# Patient Record
Sex: FEMALE | Race: WHITE | NOT HISPANIC OR LATINO | Employment: OTHER | URBAN - METROPOLITAN AREA
[De-identification: names, ages, dates, MRNs, and addresses within clinical notes are randomized per-mention and may not be internally consistent; named-entity substitution may affect disease eponyms.]

---

## 2020-12-28 ENCOUNTER — TELEPHONE (OUTPATIENT)
Dept: SLEEP MEDICINE | Facility: MEDICAL CENTER | Age: 65
End: 2020-12-28

## 2020-12-28 NOTE — TELEPHONE ENCOUNTER
Patient called states she would like to schedule NP appointment with Dr. Nunez . Pt stated she was referred over about 1 month ago.     I informed pt we do not have a referral for her and I am unable to schedule at this time . Pt was provided with Pulm Ref fax number [phone] , pt states she will contact her PCP to forward referral

## 2021-03-01 ASSESSMENT — ENCOUNTER SYMPTOMS
DYSPNEA AT REST: 0
HEMOPTYSIS: 0
WHEEZING: 0
SHORTNESS OF BREATH: 0
CHEST TIGHTNESS: 0

## 2021-03-02 ENCOUNTER — TELEPHONE (OUTPATIENT)
Dept: SLEEP MEDICINE | Facility: MEDICAL CENTER | Age: 66
End: 2021-03-02

## 2021-03-02 ENCOUNTER — OFFICE VISIT (OUTPATIENT)
Dept: SLEEP MEDICINE | Facility: MEDICAL CENTER | Age: 66
End: 2021-03-02
Payer: COMMERCIAL

## 2021-03-02 VITALS
BODY MASS INDEX: 39.99 KG/M2 | HEIGHT: 69 IN | HEART RATE: 59 BPM | WEIGHT: 270 LBS | DIASTOLIC BLOOD PRESSURE: 68 MMHG | TEMPERATURE: 97.7 F | RESPIRATION RATE: 16 BRPM | OXYGEN SATURATION: 94 % | SYSTOLIC BLOOD PRESSURE: 126 MMHG

## 2021-03-02 DIAGNOSIS — E66.9 CLASS 2 OBESITY WITH BODY MASS INDEX (BMI) OF 39.0 TO 39.9 IN ADULT, UNSPECIFIED OBESITY TYPE, UNSPECIFIED WHETHER SERIOUS COMORBIDITY PRESENT: ICD-10-CM

## 2021-03-02 DIAGNOSIS — I48.91 ATRIAL FIBRILLATION, UNSPECIFIED TYPE (HCC): ICD-10-CM

## 2021-03-02 DIAGNOSIS — J45.40 MODERATE PERSISTENT ASTHMA WITHOUT COMPLICATION: ICD-10-CM

## 2021-03-02 DIAGNOSIS — K21.9 GASTROESOPHAGEAL REFLUX DISEASE, UNSPECIFIED WHETHER ESOPHAGITIS PRESENT: ICD-10-CM

## 2021-03-02 PROCEDURE — 99204 OFFICE O/P NEW MOD 45 MIN: CPT | Performed by: INTERNAL MEDICINE

## 2021-03-02 RX ORDER — BUDESONIDE AND FORMOTEROL FUMARATE DIHYDRATE 80; 4.5 UG/1; UG/1
2 AEROSOL RESPIRATORY (INHALATION) 2 TIMES DAILY
COMMUNITY
End: 2021-10-26 | Stop reason: SDUPTHER

## 2021-03-02 RX ORDER — FAMOTIDINE 20 MG/1
20 TABLET, FILM COATED ORAL
COMMUNITY
End: 2022-05-16

## 2021-03-02 RX ORDER — DEXLANSOPRAZOLE 60 MG/1
60 CAPSULE, DELAYED RELEASE ORAL EVERY MORNING
COMMUNITY
End: 2022-05-16

## 2021-03-02 RX ORDER — ATORVASTATIN CALCIUM 20 MG/1
20 TABLET, FILM COATED ORAL NIGHTLY
Status: ON HOLD | COMMUNITY
End: 2022-04-21

## 2021-03-02 RX ORDER — ALBUTEROL SULFATE 90 UG/1
2 AEROSOL, METERED RESPIRATORY (INHALATION) EVERY 6 HOURS PRN
COMMUNITY
End: 2021-10-26 | Stop reason: SDUPTHER

## 2021-03-02 RX ORDER — LOSARTAN POTASSIUM 100 MG/1
100 TABLET ORAL DAILY
COMMUNITY
End: 2023-08-21

## 2021-03-02 ASSESSMENT — ENCOUNTER SYMPTOMS
ABDOMINAL PAIN: 0
PALPITATIONS: 0
FEVER: 0
HEMOPTYSIS: 0
TREMORS: 0
COUGH: 0
SPUTUM PRODUCTION: 0
WEIGHT LOSS: 0
WEAKNESS: 0
SPEECH CHANGE: 0
EYE REDNESS: 0
DIZZINESS: 0
NAUSEA: 0
BACK PAIN: 0
ORTHOPNEA: 0
VOMITING: 0
CLAUDICATION: 0
FALLS: 0
SHORTNESS OF BREATH: 0
SORE THROAT: 0
MYALGIAS: 0
EYE PAIN: 0
DOUBLE VISION: 0
DEPRESSION: 0
DIAPHORESIS: 0
WHEEZING: 0
FOCAL WEAKNESS: 0
EYE DISCHARGE: 0
STRIDOR: 0
DIARRHEA: 0
SINUS PAIN: 0
HEADACHES: 0
PND: 0
PHOTOPHOBIA: 0
NECK PAIN: 0
HEARTBURN: 0
CHILLS: 0
BLURRED VISION: 0
CONSTIPATION: 0

## 2021-03-02 NOTE — TELEPHONE ENCOUNTER
Patient forgot to ask you when she needs to get her shot she is due sometimes in August [phone] thank you

## 2021-03-02 NOTE — PROGRESS NOTES
Chief Complaint   Patient presents with   • Establish Care     referral Keyur Leonard M.D.       HPI: This patient is a 65 y.o. female presenting for evaluation of asthma.  The patient's past medical history significant for gastroesophageal reflux disease on proton pump inhibitor, atrial fibrillation on chronic anticoagulation and metoprolol, osteoarthritis, asthma.  She is a lifelong non-smoker.  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  She also worked in fire restoration.  She has multiple environmental and occupational exposures related to her position.  Family history is notable for both parents who suffer from lung cancer, both were heavy tobacco users.  Her father also suffered from colon cancer.  The patient was initially diagnosed with reactive airways disease sometime in the late 1990s when she had an asthma attack while at work.  She has been managed with inhaled corticosteroids, long-acting beta agonist and montelukast since that time.  She reports being treated with prednisone and azithromycin on average 1 time per year although did not suffer an issue this past fall.  She typically is treated as an outpatient with prednisone and azithromycin.  When symptoms are well controlled she uses her rescue inhaler less than once per week.  Typical triggers for her asthma include hairspray, perfume, pollen and wildfire smoke.  She reports symptoms being well controlled today on Symbicort 80, montelukast and proair.  The last PFTs that I have are from 2013 when [facility] and showed an FEV1 of 2.22 L or 75% predicted with an FEV1/FVC ratio of 69.  She had normal total lung capacity and elevated DLCO.  No recent chest imaging.    No past medical history on file.    Social History     Socioeconomic History   • Marital status:      Spouse name: Not on file   • Number of children: Not on file    • Years of education: Not on file   • Highest education level: Not on file   Occupational History   • Not on file   Tobacco Use   • Smoking status: Not on file   Substance and Sexual Activity   • Alcohol use: Not on file   • Drug use: Not on file   • Sexual activity: Not on file   Other Topics Concern   • Not on file   Social History Narrative   • Not on file     Social Determinants of Health     Financial Resource Strain:    • Difficulty of Paying Living Expenses:    Food Insecurity:    • Worried About Running Out of Food in the Last Year:    • Ran Out of Food in the Last Year:    Transportation Needs:    • Lack of Transportation (Medical):    • Lack of Transportation (Non-Medical):    Physical Activity:    • Days of Exercise per Week:    • Minutes of Exercise per Session:    Stress:    • Feeling of Stress :    Social Connections:    • Frequency of Communication with Friends and Family:    • Frequency of Social Gatherings with Friends and Family:    • Attends Sikhism Services:    • Active Member of Clubs or Organizations:    • Attends Club or Organization Meetings:    • Marital Status:    Intimate Partner Violence:    • Fear of Current or Ex-Partner:    • Emotionally Abused:    • Physically Abused:    • Sexually Abused:        No family history on file.    Current Outpatient Medications on File Prior to Visit   Medication Sig Dispense Refill   • losartan (COZAAR) 100 MG Tab Take 100 mg by mouth every day.     • atorvastatin (LIPITOR) 20 MG Tab Take 20 mg by mouth every evening.     • Dexlansoprazole (DEXILANT) 60 MG CAPSULE DELAYED RELEASE delayed-release capsule Take  by mouth.     • albuterol (PROAIR HFA) 108 (90 Base) MCG/ACT Aero Soln inhalation aerosol Inhale 2 Puffs every 6 hours as needed for Shortness of Breath.     • budesonide-formoterol (SYMBICORT) 80-4.5 MCG/ACT Aerosol Inhale 2 Puffs 2 Times a Day.     • rivaroxaban (XARELTO) 20 MG Tab tablet Take 20 mg by mouth with dinner.     • Metoprolol  "Succinate 25 MG Capsule ER 24 Hour Sprinkle Take  by mouth. 12.5 mg daily     • famotidine (PEPCID) 20 MG Tab Take 20 mg by mouth 2 times a day.     • Magnesium 400 MG Cap Take  by mouth.     • Cholecalciferol (VITAMIN D3 PO) Take 5,000 Units by mouth every day.     • Multiple Vitamins-Minerals (ZINC PO) Take  by mouth.     • montelukast (SINGULAIR) 10 MG Tab TAKE 1 TABLET BY MOUTH IN THE EVENING 30 Tab 5     No current facility-administered medications on file prior to visit.       Allergies: Hydrocodone    ROS:   Review of Systems   Constitutional: Negative for chills, diaphoresis, fever, malaise/fatigue and weight loss.   HENT: Negative for congestion, ear discharge, ear pain, hearing loss, nosebleeds, sinus pain, sore throat and tinnitus.    Eyes: Negative for blurred vision, double vision, photophobia, pain, discharge and redness.   Respiratory: Negative for cough, hemoptysis, sputum production, shortness of breath, wheezing and stridor.    Cardiovascular: Negative for chest pain, palpitations, orthopnea, claudication, leg swelling and PND.   Gastrointestinal: Negative for abdominal pain, constipation, diarrhea, heartburn, nausea and vomiting.   Genitourinary: Negative for dysuria and urgency.   Musculoskeletal: Negative for back pain, falls, joint pain, myalgias and neck pain.   Skin: Negative for itching and rash.   Neurological: Negative for dizziness, tremors, speech change, focal weakness, weakness and headaches.   Endo/Heme/Allergies: Negative for environmental allergies.   Psychiatric/Behavioral: Negative for depression.       /68 (BP Location: Right arm, Patient Position: Sitting, BP Cuff Size: Large adult)   Pulse (!) 59   Temp 36.5 °C (97.7 °F) (Temporal)   Resp 16   Ht 1.753 m (5' 9\")   Wt 122 kg (270 lb)   SpO2 94%     Physical Exam:  Physical Exam  Constitutional:       General: She is not in acute distress.     Appearance: Normal appearance. She is well-developed. She is obese.   HENT: "      Head: Normocephalic and atraumatic.      Right Ear: External ear normal.      Left Ear: External ear normal.      Nose: Nose normal. No congestion.      Mouth/Throat:      Mouth: Mucous membranes are moist.      Pharynx: No oropharyngeal exudate.   Eyes:      General: No scleral icterus.     Extraocular Movements: Extraocular movements intact.      Conjunctiva/sclera: Conjunctivae normal.      Pupils: Pupils are equal, round, and reactive to light.   Neck:      Vascular: No JVD.      Trachea: No tracheal deviation.   Cardiovascular:      Rate and Rhythm: Normal rate and regular rhythm.      Heart sounds: Normal heart sounds. No murmur. No friction rub. No gallop.    Pulmonary:      Effort: No accessory muscle usage or respiratory distress.      Breath sounds: Normal breath sounds. No wheezing or rales.   Abdominal:      General: There is no distension.      Palpations: Abdomen is soft.      Tenderness: There is no abdominal tenderness.   Musculoskeletal:         General: No tenderness or deformity. Normal range of motion.      Cervical back: Normal range of motion and neck supple.      Right lower leg: No edema.      Left lower leg: No edema.   Lymphadenopathy:      Cervical: No cervical adenopathy.   Skin:     General: Skin is warm and dry.      Findings: No rash.      Nails: There is no clubbing.   Neurological:      Mental Status: She is alert and oriented to person, place, and time.      Cranial Nerves: No cranial nerve deficit.      Gait: Gait normal.   Psychiatric:         Mood and Affect: Mood normal.         Behavior: Behavior normal.         PFTs as reviewed by me personally: As per HPI  Imaging as reviewed by me personally: No recent    Assessment:  1. Moderate persistent asthma without complication  Spirometry   2. Class 2 obesity with body mass index (BMI) of 39.0 to 39.9 in adult, unspecified obesity type, unspecified whether serious comorbidity present     3. Gastroesophageal reflux disease,  unspecified whether esophagitis present     4. Atrial fibrillation, unspecified type (HCC)         Plan:  1.  This is chronic, stable and currently well controlled.  No recent spirometry which I will order.  In the meantime she will continue Symbicort 80, montelukast and short acting bronchodilators.  She reports being up-to-date on vaccines.    2.  This test with patient increased risk for obesity related pulmonary complications such as gastroesophageal reflux.  Encourage healthy lifestyle habits.  She is planning to start a weight loss regimen in the near future that is medically supervised.  3.  This is chronic and severe although currently well controlled with lifestyle modifications including altering her diet, spacing her meals, changing medication timing and antacid therapy.  4.  Rate controlled, chronic and on anticoagulation.  Heart rate was regular today.  Suspect paroxysmal.  Return in about 4 months (around 7/2/2021) for spirometry.

## 2021-03-03 ENCOUNTER — OFFICE VISIT (OUTPATIENT)
Dept: HEALTH INFORMATION MANAGEMENT | Facility: MEDICAL CENTER | Age: 66
End: 2021-03-03
Payer: COMMERCIAL

## 2021-03-03 VITALS
DIASTOLIC BLOOD PRESSURE: 86 MMHG | HEART RATE: 57 BPM | BODY MASS INDEX: 40.17 KG/M2 | HEIGHT: 69 IN | OXYGEN SATURATION: 92 % | WEIGHT: 271.2 LBS | SYSTOLIC BLOOD PRESSURE: 136 MMHG

## 2021-03-03 DIAGNOSIS — E78.00 HYPERCHOLESTEROLEMIA: ICD-10-CM

## 2021-03-03 DIAGNOSIS — R73.01 IMPAIRED FASTING GLUCOSE: ICD-10-CM

## 2021-03-03 DIAGNOSIS — K22.719 BARRETT'S ESOPHAGUS WITH DYSPLASIA: ICD-10-CM

## 2021-03-03 DIAGNOSIS — R63.5 WEIGHT GAIN: ICD-10-CM

## 2021-03-03 DIAGNOSIS — E88.810 METABOLIC SYNDROME: ICD-10-CM

## 2021-03-03 DIAGNOSIS — I10 ESSENTIAL HYPERTENSION: ICD-10-CM

## 2021-03-03 DIAGNOSIS — K21.9 GASTROESOPHAGEAL REFLUX DISEASE WITHOUT ESOPHAGITIS: ICD-10-CM

## 2021-03-03 DIAGNOSIS — Z98.84 H/O LAPAROSCOPIC ADJUSTABLE GASTRIC BANDING: ICD-10-CM

## 2021-03-03 PROCEDURE — 99205 OFFICE O/P NEW HI 60 MIN: CPT | Performed by: INTERNAL MEDICINE

## 2021-03-03 PROCEDURE — 97802 MEDICAL NUTRITION INDIV IN: CPT | Performed by: DIETITIAN, REGISTERED

## 2021-03-03 ASSESSMENT — PATIENT HEALTH QUESTIONNAIRE - PHQ9: CLINICAL INTERPRETATION OF PHQ2 SCORE: 0

## 2021-03-03 NOTE — PROGRESS NOTES
Bariatric Medicine H&P  Chief Complaint   Patient presents with   • Weight Gain   • Obesity       Referred by:  Carlene Bowser N.*    History of Present Illness  Sara Knight is a 65 y.o.  female who presents for weight management and to help address co-morbidities caused by overweight, as below.    The patient is hoping to improve severe GERD, prediabetes with weight loss.  She has tried weight watchers, Tania Spann but lost interest.  She is interested in the VLCD program, wants to lose weight quickly to get her GERD under better control.    She has lost 5 pounds in the last week after reducing her evening p.o. intake for GERD control.    H/o Antiobesity medications: None  H/o Bariatric Surgery: Lap band with Dr. Florez 2008  Has not had it reevaluated since placement and worsening GERD    Brief Diet History (see also RD notes):  AM: Yogurt, toast with peanut butter  Lunch/Dinner: Eggs, protein/salad, protein  Snacks: Tortilla chips, nuts  Trying to eat smaller more frequent meals  Drinks: Water, juice    Behavior-Related History  Binge eating screen: Positive at times  H/o abuse: Negative     Medical Dx:  Sleep apnea screen: Negative  HLD:  Controlled with statin  HTN:  Controlled on metoprolol  IFG:  Uncontrolled on last labs, not on glucose lowering agent  GERD: Uncontrolled on Dexilant, sees digestive health regularly  Better control in the last week on Dexilant every morning, famotidine at 5 PM, 11 PM    Exercise:   Walking, weights, yoga 3 to 4 days/week     Review of Systems   Positive for joint pain, severe heartburn.  Denies excessive fatigue or lack of energy, shortness of breath, coughing, depression or anxiety  All other ROS were reviewed with patient today and are negative.      PMH/PSH:  I have reviewed the patient's medical, social and family history, allergies, and medications today.  Prior records reviewed.  Retired environmental     Physical Exam:   /86 (BP Location:  "Left arm, Patient Position: Sitting, BP Cuff Size: Large adult)   Pulse (!) 57   Ht 1.753 m (5' 9\")   Wt 123 kg (271 lb 3.2 oz)   SpO2 92%   BMI 40.05 kg/m²   Waist Measurement   Waist: 48 inch/inches  Body fat % & REE:  see accompanying flow sheet    Constitutional: Oriented to person, place, and time and well-developed, well-nourished, and in no distress.    HENT: No facial plethora.  No Cushingoid features.  No scalloped tongue.  No dental erosions.  No swollen parotids.  Head: Normocephalic.   Eyes: EOM are normal. Pupils are equal, round, and reactive to light. No periorbital edema.  No lateral thinning of eyebrows.  No vertical nystagmus.  Neck: Normal range of motion. Neck supple. No thyromegaly present. No buffalo hump.  Cardiovascular: Normal rate and regular rhythm.  No murmur heard. No IRR.  Pulmonary/Chest: Effort normal and breath sounds normal. No wheezes.   Abdominal: Soft. Bowel sounds are normal.  Grade 2 pannus.  No ascites.  No hepatosplenomegaly.   Musculoskeletal: Normal range of motion. No edema.   Neurological: Alert and oriented to person, place, and time. Normal reflexes. No cranial nerve deficit. No muscle weakness.  Gait normal.   Skin: Warm and dry. Not diaphoretic. No hirsuitism.  No acanthosis nigricans.  Not excessively dry, scaly.  No acne.  No bruising/ecchymosis.  No hyperpigmentation.  No xanthomas or acrochordon.    Psychiatric: Mood, memory, affect and judgment normal.     Laboratory:   Pending from [facility]  EKG: Pending from [facility]    Dietitian Assessment: I have reviewed the Dietitian's assessment related to this encounter.       ASSESSMENT  65 y.o. female presents for intensive lifestyle intervention for treatment of obesity and co-morbid conditions.  Obesity Stage ([place])/Class 2/2    1. Weight gain     2. Body mass index (BMI)40.0-44.9, adult ([facility])     3. Gastroesophageal reflux disease without esophagitis     4. Impaired fasting glucose     5. H/O laparoscopic " adjustable gastric banding     6. Hypercholesterolemia     7. Essential hypertension     8. Metabolic syndrome     9. Gavin's esophagus with dysplasia         The patient has struggled with weight regain since lap band placement, now with significant GERD.  EGD pending but may need LAP-BAND removed if symptoms persist.  In the meantime, will work on more rapid weight loss to get GERD under control quickly.  Continue current PPI/H2 blocker regimen.  Focus on intensive stimulus narrowing to start.  Will add antiobesity medication if required, once back to more whole food or cravings worsen.  Consider metformin given IFG.  Monitor lipids, blood pressure.    PLAN  Weight Goal: 200 lb  Dietary intervention:     MR: 3 ND +1 lean protein/green vegetable meal  May need to divide up meal replacement shake in 2 given GERD  64+ oz water per day  Avoid snacks  Physical Activity: Walking, yoga, weights  Reduce activity level this month if excess hunger develops  Labs: Pending from lab [facility]  Diagnostics:  EKG pending from [facility]  Rx changes:   Consider Contrave  Avoid antiobesity medication while on VLCD  Behavior change:   Strict stimulus narrowing for more rapid weight loss  Surgical considerations: If GERD persists, referral back to bariatric surgery for possible lap band removal  Follow-up: 2 wks with MD and RD    Face to face time spent 60 minutes,  with >50% of time devoted to one on one counseling on weight management issues, as documented above.      Patient's body mass index is 40.05 kg/m². Exercise and nutrition counseling were performed at this visit.

## 2021-03-03 NOTE — PROGRESS NOTES
"3/3/2021     Sara Knight 65 y.o.        Time in/out: 9:56-10:19    Anthropometrics/Objective  Vitals 3/3/2021   WEIGHT 271.2   HEIGHT 5' 9\"   BMI 40.05 kg/m2     Stated Goal Weight: 200 lbs per MD note  See comprehensive patient history form for further information     Subjective:  Pt is here today for the initial screening visit for the medical weight management program.   - increased GERD symptoms as of late  - does not eat past 5:30pm  - hx of lap band 2008  - Gavin's esophagus  - hx of cholecystectomy  - tried WW, BIRGIT in the past  - trying to eat smaller, more frequent meals  - will be doing 3 MR and 1 lean meal a day    See HIP Medical Questionnaire in media for more detailed diet history     AM: Yogurt, toast with peanut butter  Lunch/Dinner: Eggs, protein/salad, protein  Snacks: Tortilla chips, nuts  Trying to eat smaller more frequent meals  Drinks: Water, juice  Nutrition Diagnosis (PES Statement)  · Obesity related to excessive energy intake and inadequate energy expenditure as evidenced by BMI >30     Client history:  Condition(s) associated with a diagnosis or treatment / Pertinent Medical Hx: Gavin's esophagus, hyperlipidemia, asthma, atrial fibrilation, GERD, arthritis, anxiety, HTN    Biochemical data, medical test and procedures  No results found for: HBA1C  No results found for: POCGLUCOSE  No results found for: CHOLSTRLTOT, LDL, HDL, TRIGLYCERIDE      Nutrition Intervention  Nutrition Prescription  Recommended Daily Kcals: 1600 Kcal based on REE of 1839 MSJ (after LCD)   Recommended Daily Protein: 100g or ~30% of kcals      Meal Plan Recommendation   LCD with 3 MR and 1 lean meal    Monitoring & Evaluation Plan        Fluid intake: Consume at least 64 oz water per day. Avoid all sweetened beverages. Limit coffee to 1 cup a day (ideally no cream or sugar). Limit or avoid alcohol as discussed with Dr. Casas.     Physical Signs / Symptoms:  Weight loss towards BMI < 30   Weight change: loss " of 1-2 lbs/week    Assessment Notes:  Sara will be doing a LCD diet with 3 MR and 1 lean meal a day. She may separate 1 of the shakes into 2 half shakes to help ease her GERD symptoms but will adjust plan as she feels appropriate and works for her.    At this visit we went over LCD protocol with product options, emergency plan, and potential side effects. Sara wants to try doing LCD for 1-3 months and will continue with only MR options at this time. Stated we will discuss snack options if needed at F/U visit with JIE in two weeks.   We discussed 3 shakes and 1 whole lean meal with regular meal time schedule for these. We discussed options for either 1 full shake at snacks or 1/2 shake at two snack times.   Let Sara know that the first week or two weeks of LCD can be an adjustment period and to pay attention to her symptoms. Encouraged her to contact us with any questions in the meantime.      F/U: 2 weeks MD JIE

## 2021-03-03 NOTE — TELEPHONE ENCOUNTER
Informed the patient I have not yet received records from [facility] pulmonary. Need to confirm which one of the pneumonia vaccine was administered and when. encouraged the patient to contact [facility] and request that information. Advised to call or message me with that information.

## 2021-03-16 ENCOUNTER — OFFICE VISIT (OUTPATIENT)
Dept: HEALTH INFORMATION MANAGEMENT | Facility: MEDICAL CENTER | Age: 66
End: 2021-03-16
Payer: COMMERCIAL

## 2021-03-16 DIAGNOSIS — E88.810 METABOLIC SYNDROME: ICD-10-CM

## 2021-03-16 DIAGNOSIS — R73.01 IMPAIRED FASTING GLUCOSE: ICD-10-CM

## 2021-03-16 DIAGNOSIS — I10 ESSENTIAL HYPERTENSION: ICD-10-CM

## 2021-03-16 DIAGNOSIS — K21.9 GASTROESOPHAGEAL REFLUX DISEASE WITHOUT ESOPHAGITIS: ICD-10-CM

## 2021-03-16 DIAGNOSIS — I48.0 PAROXYSMAL ATRIAL FIBRILLATION (HCC): ICD-10-CM

## 2021-03-16 DIAGNOSIS — R63.5 WEIGHT GAIN: ICD-10-CM

## 2021-03-16 DIAGNOSIS — E78.00 HYPERCHOLESTEROLEMIA: ICD-10-CM

## 2021-03-16 DIAGNOSIS — Z98.84 H/O LAPAROSCOPIC ADJUSTABLE GASTRIC BANDING: ICD-10-CM

## 2021-03-16 PROCEDURE — 97803 MED NUTRITION INDIV SUBSEQ: CPT | Performed by: DIETITIAN, REGISTERED

## 2021-03-16 PROCEDURE — 99214 OFFICE O/P EST MOD 30 MIN: CPT | Performed by: INTERNAL MEDICINE

## 2021-03-16 NOTE — PROGRESS NOTES
"Bariatric Medicine Follow Up  Chief Complaint   Patient presents with   • Weight Gain       History of Present Illness:   Sara Knight is a 65 y.o. female who presents for follow-up to intensive behavioral modification of overweight and to help address below co-morbidities caused by overweight.      Weight Management History to Date:  3/3/2021 visit #1:  To start 3 ND +1 lean green meal daily  Divided up ND if GERD worsens  Walking, yoga, weights daily  No AOM while on VLCD  Considering lap band removal    ___    Challenges since last visit:  Some cravings  Dietary adherence: Overall good  Only having 2 meals/d  1 ND MR in am, protein/veg at L, second ND MR in early pm  Unable to eat later d/t GERD  Admits to some cravings  Exercise:  Yardwork, walking daily    AntiObesity Medication use: none  Medication efficacy/tolerance/side effects: n/a  Medication compliance: n/a    Status of comorbid conditions/other medical diagnoses:  GERD:  No issue with ND MR  PAF:  restarted last month after last visit  IFG: No recent labs not on glucose lowering agent  HLD: No recent labs, on statin  HTN: Better control with weight loss, on losartan    Physical Exam:    /84 (BP Location: Left arm, Patient Position: Sitting, BP Cuff Size: Large adult)   Pulse 69   Ht 1.753 m (5' 9\")   Wt 121 kg (267 lb 1.6 oz)   SpO2 93%   BMI 39.44 kg/m²   Waist Measurement   Waist: 47 inch/inches  Weight change since last visit: -4.1 lb   Waist Circum change since last visit: -1 in     Physical findings unchanged from prior exam.    Laboratory:   Recent labs reviewed.     ASSESSMENT  65 y.o.  female presents for intensive lifestyle intervention for treatment of obesity and co-morbid conditions.  Obesity Stage ([place])/Class: 2/2    1. Weight gain     2. Body mass index (BMI)40.0-44.9, adult ([facility])     3. H/O laparoscopic adjustable gastric banding     4. Impaired fasting glucose  MAGNESIUM    Comp Metabolic Panel   5. Hypercholesterolemia   "   6. Essential hypertension  MAGNESIUM   7. Metabolic syndrome     8. Paroxysmal atrial fibrillation (HCC)  MAGNESIUM    Comp Metabolic Panel   9. Gastroesophageal reflux disease without esophagitis         The patient is beginning to reverse her weight gain but is undereating.  GERD under better control, not worse with meal replacements so suggest increasing dose of meal replacement therapy.  Monitor electrolytes given above and atrial fibrillation.  Monitor fasting glucose, lipids, blood pressure.  Gradually increase activity level.    PLAN  Weight Goal: 200 lb  Dietary intervention:    MR: 3 ND daily instead of 2 or at least 2.5 ND MR daily +1  Lean green meal middle of the day  64+ oz water per day  Avoid eating late to avoid GERD, as she is doing  Physical Activity: Yardwork, walking daily  Reduce activity if excess hunger and cravings given low calorie intake  Labs: CMP, mag this week  Rx changes:   N/A  Behavior modification:   Continue intensive stimulus narrowing  Surgical considerations: Consider LAP-BAND removal if GERD persists  Follow-up: one month with MD  See also recent RD notes and follow-up.      Patient's body mass index is 39.44 kg/m². Exercise and nutrition counseling were performed at this visit.

## 2021-03-16 NOTE — PROGRESS NOTES
Nutrition Reassess: Medical Weight Management   Today's date: 3/16/2021  Referring Provider: No ref. provider found      Time: in/out 3:40-4:10  Visit#: 2    Subjective:  - does not eat past 5:30pm usually   (GERD)  - hx of lap band 2008  - Gavin's esophagus  - hx of cholecystectomy  - GERD symptoms decreased!  - active most days   - yoga, walking, weight lifting  - fatigue but overall feeling better  - only using 2 MR and lean meal       Diet history:   Breakfast - tea  Snack - 9:30ish = MR  Lunch - 12ish lean grocery meal salmon and js or crab cake and salad or steak and asparagus  Snack - 0  Dinner - 4-5ish MR     Anthropometrics/Objective  Today's weight:  There were no vitals filed for this visit.  BMI:  There is no height or weight on file to calculate BMI.  Starting weight/date 271.2 lbs     Total weight change : -4 lbs            Meal Plan:   LCD with 3 meal replacements and 1 lean meal per day     ReAssesment/Notes:    Sara has been working on her health goals towards better overall health and weight loss. She finds that she has been doing well with her regimen, is low on overall kcal intake however. Has been doing only 2 shakes a day rather than 3 in addition to her lean meal. She is sitting closer to about 600-700 kcal a day with her recall. Encouraged her to aim for 1000 kcal for now as she is on LCD. We discussed adding cup full of berries to am shake and 3rd shake. This should help Sara get to her kcal goals. She agreed this would be of benefit. At next visit suggest reviewing nutrition facts label and nutrition basics as time allows in future visits.     Follow-up: 4-6 weeks

## 2021-03-17 VITALS
DIASTOLIC BLOOD PRESSURE: 84 MMHG | HEIGHT: 69 IN | OXYGEN SATURATION: 93 % | WEIGHT: 267.1 LBS | BODY MASS INDEX: 39.56 KG/M2 | HEART RATE: 69 BPM | SYSTOLIC BLOOD PRESSURE: 126 MMHG

## 2021-03-17 ASSESSMENT — PATIENT HEALTH QUESTIONNAIRE - PHQ9: CLINICAL INTERPRETATION OF PHQ2 SCORE: 0

## 2021-03-23 ENCOUNTER — HOSPITAL ENCOUNTER (OUTPATIENT)
Dept: LAB | Facility: MEDICAL CENTER | Age: 66
End: 2021-03-23
Attending: INTERNAL MEDICINE
Payer: COMMERCIAL

## 2021-03-23 DIAGNOSIS — R73.01 IMPAIRED FASTING GLUCOSE: ICD-10-CM

## 2021-03-23 DIAGNOSIS — I10 ESSENTIAL HYPERTENSION: ICD-10-CM

## 2021-03-23 DIAGNOSIS — I48.0 PAROXYSMAL ATRIAL FIBRILLATION (HCC): ICD-10-CM

## 2021-03-23 LAB
ALBUMIN SERPL BCP-MCNC: 3.8 G/DL (ref 3.2–4.9)
ALBUMIN/GLOB SERPL: 1.1 G/DL
ALP SERPL-CCNC: 87 U/L (ref 30–99)
ALT SERPL-CCNC: 14 U/L (ref 2–50)
ANION GAP SERPL CALC-SCNC: 9 MMOL/L (ref 7–16)
AST SERPL-CCNC: 15 U/L (ref 12–45)
BILIRUB SERPL-MCNC: 0.7 MG/DL (ref 0.1–1.5)
BUN SERPL-MCNC: 17 MG/DL (ref 8–22)
CALCIUM SERPL-MCNC: 9.4 MG/DL (ref 8.5–10.5)
CHLORIDE SERPL-SCNC: 104 MMOL/L (ref 96–112)
CO2 SERPL-SCNC: 27 MMOL/L (ref 20–33)
CREAT SERPL-MCNC: 0.86 MG/DL (ref 0.5–1.4)
GLOBULIN SER CALC-MCNC: 3.4 G/DL (ref 1.9–3.5)
GLUCOSE SERPL-MCNC: 101 MG/DL (ref 65–99)
MAGNESIUM SERPL-MCNC: 2.1 MG/DL (ref 1.5–2.5)
POTASSIUM SERPL-SCNC: 4.5 MMOL/L (ref 3.6–5.5)
PROT SERPL-MCNC: 7.2 G/DL (ref 6–8.2)
SODIUM SERPL-SCNC: 140 MMOL/L (ref 135–145)

## 2021-03-23 PROCEDURE — 36415 COLL VENOUS BLD VENIPUNCTURE: CPT

## 2021-03-23 PROCEDURE — 80053 COMPREHEN METABOLIC PANEL: CPT

## 2021-03-23 PROCEDURE — 83735 ASSAY OF MAGNESIUM: CPT

## 2021-04-20 ENCOUNTER — OFFICE VISIT (OUTPATIENT)
Dept: HEALTH INFORMATION MANAGEMENT | Facility: MEDICAL CENTER | Age: 66
End: 2021-04-20
Payer: COMMERCIAL

## 2021-04-20 VITALS
DIASTOLIC BLOOD PRESSURE: 74 MMHG | SYSTOLIC BLOOD PRESSURE: 126 MMHG | HEIGHT: 69 IN | BODY MASS INDEX: 38.45 KG/M2 | WEIGHT: 259.6 LBS | OXYGEN SATURATION: 92 % | HEART RATE: 57 BPM

## 2021-04-20 DIAGNOSIS — E88.810 METABOLIC SYNDROME: ICD-10-CM

## 2021-04-20 DIAGNOSIS — E78.00 HYPERCHOLESTEROLEMIA: ICD-10-CM

## 2021-04-20 DIAGNOSIS — R73.01 IMPAIRED FASTING GLUCOSE: ICD-10-CM

## 2021-04-20 DIAGNOSIS — I10 ESSENTIAL HYPERTENSION: ICD-10-CM

## 2021-04-20 DIAGNOSIS — Z98.84 H/O LAPAROSCOPIC ADJUSTABLE GASTRIC BANDING: ICD-10-CM

## 2021-04-20 DIAGNOSIS — E66.9 OBESITY (BMI 30-39.9): ICD-10-CM

## 2021-04-20 DIAGNOSIS — R63.5 WEIGHT GAIN: ICD-10-CM

## 2021-04-20 PROCEDURE — 97803 MED NUTRITION INDIV SUBSEQ: CPT | Performed by: DIETITIAN, REGISTERED

## 2021-04-20 PROCEDURE — 99214 OFFICE O/P EST MOD 30 MIN: CPT | Performed by: INTERNAL MEDICINE

## 2021-04-20 ASSESSMENT — PATIENT HEALTH QUESTIONNAIRE - PHQ9: CLINICAL INTERPRETATION OF PHQ2 SCORE: 0

## 2021-04-20 NOTE — PROGRESS NOTES
"Bariatric Medicine Follow Up  Chief Complaint   Patient presents with   • Weight Gain       History of Present Illness:   Sara Knight is a 65 y.o. female who presents for follow-up to intensive behavioral modification of overweight and to help address below co-morbidities caused by overweight.      Weight Management History to Date:  3/16/2021 visit #2:  3 ND MR instead of 2 daily  Lean green meal middle of the day  Avoid eating late to reduce GERD  Yardwork, walking daily      3/3/2021 visit #1:  To start 3 ND +1 lean green meal daily  Divided up ND if GERD worsens  Walking, yoga, weights daily  No AOM while on VLCD  Considering lap band removal  ___    Challenges since last visit:  fewer  Dietary adherence:  Good  3 ND MR + 1 grocery meal, lower carb before 3 pm  Exercise:  Walking 3 d/w, yoga in between    AntiObesity Medication use: n/a  Medication efficacy/tolerance/side effects: n/a  Medication compliance: n/a    Status of comorbid conditions/other medical diagnoses:  IFG:  Uncontrolled on most recent labs, not on glucose lowering agent  HLD: Controlled on statin  HTN: Controlled on losartan       Physical Exam:    /74 (BP Location: Left arm, Patient Position: Sitting, BP Cuff Size: Large adult long)   Pulse (!) 57   Ht 1.753 m (5' 9\")   Wt 118 kg (259 lb 9.6 oz)   SpO2 92%   BMI 38.34 kg/m²   Waist Measurement   Waist: 47 inch/inches  Weight change since last visit: -7.5 lb (-11.6 lb total)  Waist Circum change since last visit: 0 in (-1 in total)    Physical findings unchanged from prior exam.    Laboratory:   Recent labs reviewed.     ASSESSMENT  65 y.o.  female presents for intensive lifestyle intervention for treatment of obesity and co-morbid conditions.  Obesity Stage ([place])/Class: 2/2    1. Weight gain     2. Obesity (BMI 30-39.9)     3. Impaired fasting glucose     4. Hypercholesterolemia     5. Essential hypertension     6. Metabolic syndrome     7. H/O laparoscopic adjustable gastric " banding         The patient is doing well, continues to reverse her previous weight gain.  He is responding to more intensive stimulus narrowing so recommend continuing same.  Monitor fasting glucose, which should improve with further weight loss, lipids, blood pressure.    PLAN  Weight Goal:  200 lb  Dietary intervention:    MR:  3 ND +  High Protein/Low Carb whole food meal mid day  64+ oz water per day  Not eating after 3 pm to avoid later GERD sx  Physical Activity: Yardwork, walking at least 30 minutes daily recommended  Labs: N/A  Rx changes:   Avoid GLP1 given GERD  Side Effects: Risks, benefits, alternatives discussed, consent signed.  Behavior modification:   Continue stimulus narrowing  Surgical considerations: Considering LAP-BAND removal if GERD persists  Follow-up: one month with MD  See also recent RD notes and follow-up.      Patient's body mass index is 38.34 kg/m². Exercise and nutrition counseling were performed at this visit.

## 2021-04-20 NOTE — PROGRESS NOTES
"Nutrition Reassess: Medical Weight Management   Today's date: 4/20/2021  Referring Provider: No ref. provider found      Time: in/out 11:09-11:25  Visit#: 3    Subjective:  - does not eat past 5:30pm usually              (GERD)  - hx of lap band 2008  - Gavin's esophagus  - hx of cholecystectomy  - adding berries to shake now  - pleased with progress    Anthropometrics/Objective  Vitals 4/20/2021   WEIGHT 259.6   HEIGHT 5' 9\"   BMI 38.34 kg/m2       Starting weight/date 271.2 lbs              Total weight change : -12 lbs                                                                      Meal Plan:   LCD with 2-3 meal replacements and 1 lean meal per day     ReAssesment/Notes:    Sara has been working on her overall health goals. She has been pleased with her progress, lowered GERD reactions/severity. Is concerned about her trending down in skeletal muscle mass per anthropometric data. We discussed weight bearing exercises. Discussed yoga and fitness resources. Sara will work on this is in the meantime. At next visit suggest reviewing nutrition facts label.    Follow-up: 4-6 weeks     "

## 2021-04-24 ENCOUNTER — HOSPITAL ENCOUNTER (OUTPATIENT)
Dept: RADIOLOGY | Facility: MEDICAL CENTER | Age: 66
End: 2021-04-24

## 2021-04-24 ENCOUNTER — HOSPITAL ENCOUNTER (OUTPATIENT)
Dept: RADIOLOGY | Facility: MEDICAL CENTER | Age: 66
End: 2021-04-24
Attending: FAMILY MEDICINE
Payer: COMMERCIAL

## 2021-04-24 DIAGNOSIS — Z12.31 VISIT FOR SCREENING MAMMOGRAM: ICD-10-CM

## 2021-04-24 PROCEDURE — 77063 BREAST TOMOSYNTHESIS BI: CPT

## 2021-05-18 ENCOUNTER — PRE-ADMISSION TESTING (OUTPATIENT)
Dept: ADMISSIONS | Facility: MEDICAL CENTER | Age: 66
End: 2021-05-18
Attending: INTERNAL MEDICINE
Payer: COMMERCIAL

## 2021-05-18 DIAGNOSIS — Z01.812 PRE-OPERATIVE LABORATORY EXAMINATION: ICD-10-CM

## 2021-05-18 LAB
ANION GAP SERPL CALC-SCNC: 9 MMOL/L (ref 7–16)
BUN SERPL-MCNC: 15 MG/DL (ref 8–22)
CALCIUM SERPL-MCNC: 9.2 MG/DL (ref 8.4–10.2)
CHLORIDE SERPL-SCNC: 107 MMOL/L (ref 96–112)
CO2 SERPL-SCNC: 24 MMOL/L (ref 20–33)
CREAT SERPL-MCNC: 0.85 MG/DL (ref 0.5–1.4)
EST. AVERAGE GLUCOSE BLD GHB EST-MCNC: 131 MG/DL
GLUCOSE SERPL-MCNC: 106 MG/DL (ref 65–99)
HBA1C MFR BLD: 6.2 % (ref 4–5.6)
POTASSIUM SERPL-SCNC: 3.6 MMOL/L (ref 3.6–5.5)
SODIUM SERPL-SCNC: 140 MMOL/L (ref 135–145)

## 2021-05-18 PROCEDURE — 80048 BASIC METABOLIC PNL TOTAL CA: CPT

## 2021-05-18 PROCEDURE — 83036 HEMOGLOBIN GLYCOSYLATED A1C: CPT

## 2021-05-18 PROCEDURE — 36415 COLL VENOUS BLD VENIPUNCTURE: CPT

## 2021-05-18 RX ORDER — UBIDECARENONE 75 MG
200 CAPSULE ORAL DAILY
COMMUNITY

## 2021-05-18 NOTE — PREPROCEDURE INSTRUCTIONS
"Patient present for preadmit appointment: \"Preparing for your Procedure information,\" pain management, patient safety, covid symptoms, self isolating and fasting protocol per anesthesia sheets given to patient with verbal and written instructions. Patient instructed to continue prescribed medications through the day before surgery, instructed to take the following medications the day of surgery per anesthesia protocol: Symbicort, Pepcid, Metoprolol, Albuterol if needed. Patient instructed to stop blood thinner per MD instruction: 2 days prior. Patient states no reaction to previous anesthesia. Pt reports no s/s of urinary tract infection. Covid appointment scheduled 5/31. Patient educated to call MD's office if any symptoms of Covid appear. Patient understands education and has no other questions or concerns at this time.   "

## 2021-05-27 ENCOUNTER — OFFICE VISIT (OUTPATIENT)
Dept: HEALTH INFORMATION MANAGEMENT | Facility: MEDICAL CENTER | Age: 66
End: 2021-05-27
Payer: COMMERCIAL

## 2021-05-27 VITALS
SYSTOLIC BLOOD PRESSURE: 124 MMHG | BODY MASS INDEX: 37.29 KG/M2 | HEART RATE: 54 BPM | WEIGHT: 251.8 LBS | DIASTOLIC BLOOD PRESSURE: 76 MMHG | HEIGHT: 69 IN | OXYGEN SATURATION: 95 %

## 2021-05-27 DIAGNOSIS — Z98.84 H/O LAPAROSCOPIC ADJUSTABLE GASTRIC BANDING: ICD-10-CM

## 2021-05-27 DIAGNOSIS — K21.9 GASTROESOPHAGEAL REFLUX DISEASE WITHOUT ESOPHAGITIS: ICD-10-CM

## 2021-05-27 DIAGNOSIS — R63.5 WEIGHT GAIN: ICD-10-CM

## 2021-05-27 DIAGNOSIS — E78.00 HYPERCHOLESTEROLEMIA: ICD-10-CM

## 2021-05-27 DIAGNOSIS — R73.01 IMPAIRED FASTING GLUCOSE: ICD-10-CM

## 2021-05-27 DIAGNOSIS — E88.810 METABOLIC SYNDROME: ICD-10-CM

## 2021-05-27 DIAGNOSIS — I10 ESSENTIAL HYPERTENSION: ICD-10-CM

## 2021-05-27 DIAGNOSIS — E66.9 OBESITY (BMI 30-39.9): ICD-10-CM

## 2021-05-27 PROCEDURE — 99214 OFFICE O/P EST MOD 30 MIN: CPT | Performed by: INTERNAL MEDICINE

## 2021-05-27 PROCEDURE — 97803 MED NUTRITION INDIV SUBSEQ: CPT | Performed by: DIETITIAN, REGISTERED

## 2021-05-27 ASSESSMENT — PATIENT HEALTH QUESTIONNAIRE - PHQ9: CLINICAL INTERPRETATION OF PHQ2 SCORE: 0

## 2021-05-27 NOTE — PROGRESS NOTES
"Bariatric Medicine Follow Up  Chief Complaint   Patient presents with   • Weight Gain   • Obesity       History of Present Illness:   Sara Knight is a 65 y.o. female who presents for follow-up to intensive behavioral modification of overweight and to help address below co-morbidities caused by overweight.      Weight Management History to Date:  4/20/2021 visit #3:  Patient is doing well, has lost 11.6 pounds  Continues 3 ND MR +1 lean green meal daily  Not eating after 3 PM to avoid GERD symptoms later  Yardwork 30 minutes daily  Considering LAP-BAND removal if GERD persists      3/16/2021 visit #2:  3 ND MR instead of 2 daily  Lean green meal middle of the day  Avoid eating late to reduce GERD  Yardwork, walking daily        3/3/2021 visit #1:  To start 3 ND +1 lean green meal daily  Divided up ND if GERD worsens  Walking, yoga, weights daily  No AOM while on VLCD  Considering lap band removal    ___    Challenges since last visit:  Some stress eating with health concerns  Dietary adherence:  Good  2 ND MR and 1 lean/green meal  midday  Very consistent with what she is eating  A few days overeating due to family stressors but not often  Exercise:  Yoga 2/wk, getting easier    AntiObesity Medication use: started enzymes  Medication efficacy/tolerance/side effects: Effective?  Medication compliance: Just started taking    Status of comorbid conditions/other medical diagnoses:  IFG: Uncontrolled on most recent labs with A1c 6.2, consider metformin but would not recommend GLP-1 agonist given significant GERD  HLD: Controlled with statin  HTN: Controlled with losartan, metoprolol  GERD: Control improving with weight loss, continues Dexilant, famotidine, may have gastroparesis per GI         Physical Exam:    /76 (BP Location: Left arm, Patient Position: Sitting, BP Cuff Size: Large adult long)   Pulse (!) 54   Ht 1.753 m (5' 9\")   Wt 114 kg (251 lb 12.8 oz)   LMP  (LMP Unknown)   SpO2 95%   BMI 37.18 " kg/m²   Waist Measurement   Waist: 46 inch/inches  Weight change since last visit: -8 lb (- 19.4 lb total)  Waist Circum change since last visit: -1 in (-2 in total)    Physical findings unchanged from prior exam.    Laboratory:   Recent labs reviewed.     ASSESSMENT  65 y.o.  female presents for intensive lifestyle intervention for treatment of obesity and co-morbid conditions.  Obesity Stage ([place])/Class: 1/2    1. Gastroesophageal reflux disease without esophagitis     2. Impaired fasting glucose     3. Hypercholesterolemia     4. Essential hypertension     5. Metabolic syndrome     6. H/O laparoscopic adjustable gastric banding     7. Obesity (BMI 30-39.9)         The patient continues to do well.  Her GERD is improving with weight loss.  She has now lost nearly 20 pounds, 2 inches off her waist.  Continue stimulus narrowing, lower carb meals.  GI following GERD.  Continue to monitor fasting glucose, consider medication if does not improve with further weight loss.  Monitor lipids, blood pressure.  Continue increasing activity level.    PLAN  Weight Goal: 200 lb  Dietary intervention:    MR: 2 ND daily +1  High Protein/Low Carb whole food meal  Not tracking intake  64+ oz water per day  Avoid eating late (usually has 1 ND MR instead of whole food dinner mid afternoon)  Physical Activity: Yoga 2/week, yard work or walking daily  Labs: N/A  Rx changes:   None  Consider metformin if next fasting glucose not improved  Behavior modification:   Continue intensive stimulus narrowing  Surgical considerations: Patient has declined referral to bariatric surgery  Follow-up: one month with MD  See also recent RD notes and follow-up.      Patient's body mass index is 37.18 kg/m². Exercise and nutrition counseling were performed at this visit.

## 2021-05-27 NOTE — PROGRESS NOTES
"Nutrition Reassess: Medical Weight Management   Today's date: 5/27/2021  Referring Provider: No ref. provider found      Time: in/out 10:40-11:08  Visit#: 4    Subjective:  - does not eat past 5:30pm usually              (GERD)  - hx of lap band 2008  - Gavin's esophagus  - hx of cholecystectomy  - more weight bearing exercises  - camping more   - grocery meal at middle of day  - GERD symptoms improving   -  had pacemaker placed  - has pre-cancerous polpy    - getting removed next week  - feeling comfortable with other foods being around    Anthropometrics/Objective  Vitals 5/27/2021   WEIGHT 251.8   HEIGHT 5' 9\"   BMI 37.18 kg/m2       Starting weight/date 271.2 lbs          Last weight: 259.6 lbs      Total weight change : -20 lbs                                                                      Meal Plan:   LCD with 2-3 meal replacements and 1 lean meal per day     ReAssesment/Notes:    Sara has continued with her LCD meal plan and doing well with her weight loss goals. She was feeling as if she would have liked a more drastic weight loss. We discussed her progress and how well she is doing! Has been doing well with being able to go to restaurants and have meals that align with her current meal plan. Has been more active lately too. Applauded her for all her efforts, especially considering the health stress her and her  have recently endured. At next visit suggest reviewing nutrition facts label.     Follow-up: 4-6 weeks     "

## 2021-05-31 ENCOUNTER — PRE-ADMISSION TESTING (OUTPATIENT)
Dept: ADMISSIONS | Facility: MEDICAL CENTER | Age: 66
End: 2021-05-31
Attending: INTERNAL MEDICINE
Payer: COMMERCIAL

## 2021-05-31 DIAGNOSIS — Z01.812 PRE-OPERATIVE LABORATORY EXAMINATION: ICD-10-CM

## 2021-05-31 LAB — COVID ORDER STATUS COVID19: NORMAL

## 2021-05-31 PROCEDURE — C9803 HOPD COVID-19 SPEC COLLECT: HCPCS

## 2021-05-31 PROCEDURE — U0003 INFECTIOUS AGENT DETECTION BY NUCLEIC ACID (DNA OR RNA); SEVERE ACUTE RESPIRATORY SYNDROME CORONAVIRUS 2 (SARS-COV-2) (CORONAVIRUS DISEASE [COVID-19]), AMPLIFIED PROBE TECHNIQUE, MAKING USE OF HIGH THROUGHPUT TECHNOLOGIES AS DESCRIBED BY CMS-2020-01-R: HCPCS

## 2021-05-31 PROCEDURE — U0005 INFEC AGEN DETEC AMPLI PROBE: HCPCS

## 2021-06-01 LAB
SARS-COV-2 RNA RESP QL NAA+PROBE: NOTDETECTED
SPECIMEN SOURCE: NORMAL

## 2021-06-03 ENCOUNTER — ANESTHESIA EVENT (OUTPATIENT)
Dept: SURGERY | Facility: MEDICAL CENTER | Age: 66
End: 2021-06-03
Payer: COMMERCIAL

## 2021-06-03 ENCOUNTER — ANESTHESIA (OUTPATIENT)
Dept: SURGERY | Facility: MEDICAL CENTER | Age: 66
End: 2021-06-03
Payer: COMMERCIAL

## 2021-06-03 ENCOUNTER — HOSPITAL ENCOUNTER (OUTPATIENT)
Facility: MEDICAL CENTER | Age: 66
End: 2021-06-03
Attending: INTERNAL MEDICINE | Admitting: INTERNAL MEDICINE
Payer: COMMERCIAL

## 2021-06-03 VITALS
HEIGHT: 69 IN | OXYGEN SATURATION: 93 % | WEIGHT: 254.41 LBS | HEART RATE: 56 BPM | BODY MASS INDEX: 37.68 KG/M2 | RESPIRATION RATE: 16 BRPM | TEMPERATURE: 97.3 F | DIASTOLIC BLOOD PRESSURE: 66 MMHG | SYSTOLIC BLOOD PRESSURE: 150 MMHG

## 2021-06-03 LAB — PATHOLOGY CONSULT NOTE: NORMAL

## 2021-06-03 PROCEDURE — 160203 HCHG ENDO MINUTES - 1ST 30 MINS LEVEL 4: Performed by: INTERNAL MEDICINE

## 2021-06-03 PROCEDURE — 700101 HCHG RX REV CODE 250: Performed by: ANESTHESIOLOGY

## 2021-06-03 PROCEDURE — 700111 HCHG RX REV CODE 636 W/ 250 OVERRIDE (IP): Performed by: ANESTHESIOLOGY

## 2021-06-03 PROCEDURE — 700105 HCHG RX REV CODE 258: Performed by: INTERNAL MEDICINE

## 2021-06-03 PROCEDURE — 160009 HCHG ANES TIME/MIN: Performed by: INTERNAL MEDICINE

## 2021-06-03 PROCEDURE — 160035 HCHG PACU - 1ST 60 MINS PHASE I: Performed by: INTERNAL MEDICINE

## 2021-06-03 PROCEDURE — 700101 HCHG RX REV CODE 250: Performed by: INTERNAL MEDICINE

## 2021-06-03 PROCEDURE — 88305 TISSUE EXAM BY PATHOLOGIST: CPT

## 2021-06-03 PROCEDURE — 160002 HCHG RECOVERY MINUTES (STAT): Performed by: INTERNAL MEDICINE

## 2021-06-03 PROCEDURE — 160046 HCHG PACU - 1ST 60 MINS PHASE II: Performed by: INTERNAL MEDICINE

## 2021-06-03 PROCEDURE — 160208 HCHG ENDO MINUTES - EA ADDL 1 MIN LEVEL 4: Performed by: INTERNAL MEDICINE

## 2021-06-03 PROCEDURE — 160025 RECOVERY II MINUTES (STATS): Performed by: INTERNAL MEDICINE

## 2021-06-03 PROCEDURE — 160048 HCHG OR STATISTICAL LEVEL 1-5: Performed by: INTERNAL MEDICINE

## 2021-06-03 RX ORDER — IPRATROPIUM BROMIDE AND ALBUTEROL SULFATE 2.5; .5 MG/3ML; MG/3ML
3 SOLUTION RESPIRATORY (INHALATION)
Status: DISCONTINUED | OUTPATIENT
Start: 2021-06-03 | End: 2021-06-03 | Stop reason: HOSPADM

## 2021-06-03 RX ORDER — HYDROMORPHONE HYDROCHLORIDE 1 MG/ML
0.1 INJECTION, SOLUTION INTRAMUSCULAR; INTRAVENOUS; SUBCUTANEOUS
Status: DISCONTINUED | OUTPATIENT
Start: 2021-06-03 | End: 2021-06-03 | Stop reason: HOSPADM

## 2021-06-03 RX ORDER — SODIUM CHLORIDE, SODIUM LACTATE, POTASSIUM CHLORIDE, CALCIUM CHLORIDE 600; 310; 30; 20 MG/100ML; MG/100ML; MG/100ML; MG/100ML
INJECTION, SOLUTION INTRAVENOUS CONTINUOUS
Status: DISCONTINUED | OUTPATIENT
Start: 2021-06-03 | End: 2021-06-03 | Stop reason: HOSPADM

## 2021-06-03 RX ORDER — LIDOCAINE HYDROCHLORIDE 20 MG/ML
INJECTION, SOLUTION EPIDURAL; INFILTRATION; INTRACAUDAL; PERINEURAL PRN
Status: DISCONTINUED | OUTPATIENT
Start: 2021-06-03 | End: 2021-06-03 | Stop reason: SURG

## 2021-06-03 RX ORDER — MEPERIDINE HYDROCHLORIDE 25 MG/ML
25 INJECTION INTRAMUSCULAR; INTRAVENOUS; SUBCUTANEOUS
Status: DISCONTINUED | OUTPATIENT
Start: 2021-06-03 | End: 2021-06-03 | Stop reason: HOSPADM

## 2021-06-03 RX ORDER — HYDROMORPHONE HYDROCHLORIDE 1 MG/ML
0.2 INJECTION, SOLUTION INTRAMUSCULAR; INTRAVENOUS; SUBCUTANEOUS
Status: DISCONTINUED | OUTPATIENT
Start: 2021-06-03 | End: 2021-06-03 | Stop reason: HOSPADM

## 2021-06-03 RX ORDER — DIPHENHYDRAMINE HYDROCHLORIDE 50 MG/ML
12.5 INJECTION INTRAMUSCULAR; INTRAVENOUS
Status: DISCONTINUED | OUTPATIENT
Start: 2021-06-03 | End: 2021-06-03 | Stop reason: HOSPADM

## 2021-06-03 RX ORDER — SODIUM CHLORIDE, SODIUM LACTATE, POTASSIUM CHLORIDE, CALCIUM CHLORIDE 600; 310; 30; 20 MG/100ML; MG/100ML; MG/100ML; MG/100ML
INJECTION, SOLUTION INTRAVENOUS CONTINUOUS
Status: ACTIVE | OUTPATIENT
Start: 2021-06-03 | End: 2021-06-03

## 2021-06-03 RX ORDER — DEXAMETHASONE SODIUM PHOSPHATE 4 MG/ML
INJECTION, SOLUTION INTRA-ARTICULAR; INTRALESIONAL; INTRAMUSCULAR; INTRAVENOUS; SOFT TISSUE PRN
Status: DISCONTINUED | OUTPATIENT
Start: 2021-06-03 | End: 2021-06-03 | Stop reason: SURG

## 2021-06-03 RX ORDER — OXYCODONE HCL 5 MG/5 ML
5 SOLUTION, ORAL ORAL
Status: DISCONTINUED | OUTPATIENT
Start: 2021-06-03 | End: 2021-06-03 | Stop reason: HOSPADM

## 2021-06-03 RX ORDER — KETOROLAC TROMETHAMINE 30 MG/ML
INJECTION, SOLUTION INTRAMUSCULAR; INTRAVENOUS PRN
Status: DISCONTINUED | OUTPATIENT
Start: 2021-06-03 | End: 2021-06-03 | Stop reason: SURG

## 2021-06-03 RX ORDER — OXYCODONE HCL 5 MG/5 ML
10 SOLUTION, ORAL ORAL
Status: DISCONTINUED | OUTPATIENT
Start: 2021-06-03 | End: 2021-06-03 | Stop reason: HOSPADM

## 2021-06-03 RX ORDER — ONDANSETRON 2 MG/ML
4 INJECTION INTRAMUSCULAR; INTRAVENOUS
Status: DISCONTINUED | OUTPATIENT
Start: 2021-06-03 | End: 2021-06-03 | Stop reason: HOSPADM

## 2021-06-03 RX ORDER — HYDROMORPHONE HYDROCHLORIDE 1 MG/ML
0.5 INJECTION, SOLUTION INTRAMUSCULAR; INTRAVENOUS; SUBCUTANEOUS
Status: DISCONTINUED | OUTPATIENT
Start: 2021-06-03 | End: 2021-06-03 | Stop reason: HOSPADM

## 2021-06-03 RX ORDER — ONDANSETRON 2 MG/ML
INJECTION INTRAMUSCULAR; INTRAVENOUS PRN
Status: DISCONTINUED | OUTPATIENT
Start: 2021-06-03 | End: 2021-06-03 | Stop reason: SURG

## 2021-06-03 RX ORDER — MIDAZOLAM HYDROCHLORIDE 1 MG/ML
1 INJECTION INTRAMUSCULAR; INTRAVENOUS
Status: DISCONTINUED | OUTPATIENT
Start: 2021-06-03 | End: 2021-06-03 | Stop reason: HOSPADM

## 2021-06-03 RX ADMIN — KETOROLAC TROMETHAMINE 30 MG: 30 INJECTION, SOLUTION INTRAMUSCULAR at 09:03

## 2021-06-03 RX ADMIN — WATER 15 ML: 100 IRRIGANT IRRIGATION at 06:42

## 2021-06-03 RX ADMIN — SUGAMMADEX 200 MG: 100 INJECTION, SOLUTION INTRAVENOUS at 09:20

## 2021-06-03 RX ADMIN — PROPOFOL 200 MG: 10 INJECTION, EMULSION INTRAVENOUS at 08:49

## 2021-06-03 RX ADMIN — FENTANYL CITRATE 100 MCG: 50 INJECTION, SOLUTION INTRAMUSCULAR; INTRAVENOUS at 08:49

## 2021-06-03 RX ADMIN — SODIUM CHLORIDE, POTASSIUM CHLORIDE, SODIUM LACTATE AND CALCIUM CHLORIDE: 600; 310; 30; 20 INJECTION, SOLUTION INTRAVENOUS at 06:41

## 2021-06-03 RX ADMIN — DEXAMETHASONE SODIUM PHOSPHATE 4 MG: 4 INJECTION, SOLUTION INTRAMUSCULAR; INTRAVENOUS at 09:00

## 2021-06-03 RX ADMIN — MIDAZOLAM HYDROCHLORIDE 2 MG: 1 INJECTION, SOLUTION INTRAMUSCULAR; INTRAVENOUS at 08:45

## 2021-06-03 RX ADMIN — ROCURONIUM BROMIDE 50 MG: 10 INJECTION INTRAVENOUS at 08:49

## 2021-06-03 RX ADMIN — LIDOCAINE HYDROCHLORIDE 60 MG: 20 INJECTION, SOLUTION EPIDURAL; INFILTRATION; INTRACAUDAL; PERINEURAL at 08:49

## 2021-06-03 RX ADMIN — ONDANSETRON 4 MG: 2 INJECTION INTRAMUSCULAR; INTRAVENOUS at 09:00

## 2021-06-03 ASSESSMENT — PAIN SCALES - GENERAL: PAIN_LEVEL: 1

## 2021-06-03 NOTE — OP REPORT
DATE OF SERVICE:  06/03/2021     PHYSICIAN:  Jaime Starr MD     ANESTHESIOLOGIST:  Paul Edmonds MD     MEDICATION:  General anesthesia.     PREOPERATIVE DIAGNOSIS:  Duodenal adenoma.     POSTOPERATIVE DIAGNOSES:  1.  Adenoma at the apex of duodenal cap examined by endoscopic ultrasound to   reveal only mucosal involvement.  2.  Endoscopic mucosal resection utilizing hot snare resection and cold biopsy   forceps, injection prior to resection with saline and then control of   bleeding utilizing 2 clip placement.     CONSENT:  Procedure risks and benefits reviewed thoroughly with the patient.    Risks include but not limited to bleeding, perforation, and side effects of   medication were informed.  The patient voiced understanding and agreed to   proceed.     DESCRIPTION OF PROCEDURE:  The patient was placed in left lateral decubitus   position.  After sedation was achieved with intubation and sedation, a forward   viewing gastroscope was passed carefully and easily under direct   visualization into the esophagus.  All esophageal views were normal as were   forward and retroflexed views of the stomach.  The scope was advanced to the   pyloric valve and duodenal bulb.  At the apex of duodenal cap, there was a 1   cm irregular-shaped adenoma which had been previously biopsied.  There was no   erosion or ulceration.  There was no dimpling.  Examination of the rest of the   duodenum, second and third were otherwise normal.  The scope was retracted   and removed.     Endoscopic ultrasound of the duodenum: A side-viewing radial echoendoscope was   passed carefully and easily under indirect visualization directly to the   duodenal bulb.  This area was instilled with some saline, which will improve   echogenicity. Examination of the mucosal layer, which revealed a mild   enlargement of the mucosal layer in the location of the adenoma.  There was no   invasion into the submucosa and no evidence for duodenal  lymphadenopathy.    The scope was removed.     Endoscopic mucosal resection utilizing saline injection, hot snare polypectomy   technique, cold biopsy forceps and control of bleeding utilizing 2 clips:  A   forward viewing gastroscope was then passed carefully and easily as before   directly to the duodenal bulb. Saline was utilized approximately 5 mL to 6 mL   to inject below the polyp in the subepithelial region.  The submucosal area   inflated nicely and no evidence for dimpling or invasion could be appreciated.   After which, a snare was utilized to resect this and collected through the   channel of the scope.  There appeared to be a less than 1% of the polyp   retained after resection and this was removed with biopsy forceps.  There was   minimal oozing, after which was controlled easily with 2 clip placement, which   brought together the normal tissue and completely zippered the resected area   without complication.  The stomach was suctioned and the scope was removed.     COMPLICATIONS:  None.     ESTIMATED BLOOD LOSS:  5 mL.     SPECIMENS:  Obtained.     RECOMMENDATIONS:  Review results of histopathology.  Further recommendations   to follow.  Repeat EGD in 6-9 months' [facility] at [facility] to   ensure complete resection and no recurrence with myself, Dr. Starr.        ______________________________  MD NATALIA Urrutia/BRENDA/[place]    DD:  06/03/2021 09:53  DT:  06/03/2021 10:52    Job#:  [id number]

## 2021-06-03 NOTE — OR NURSING
1102: assumed care  1124: Discharge instructions and medications gone over with Pt and ride. All questions answered. Pt meets discharge criteria. PIV DC'd. Pt transported to POV via wheelchair in stable condition.   Discharge to care of friend post uneventful stay in PACU 2.

## 2021-06-03 NOTE — ANESTHESIA TIME REPORT
Anesthesia Start and Stop Event Times     Date Time Event    6/3/2021 0819 Ready for Procedure     0845 Anesthesia Start     0929 Anesthesia Stop        Responsible Staff  06/03/21    Name Role Begin End    Paul Edmonds M.D. Anesth 0845 0929        Preop Diagnosis (Free Text):  Pre-op Diagnosis     DUODENAL ADENOMA WITH LOW GRADE DYSPLASIA        Preop Diagnosis (Codes):    Post op Diagnosis  Adenomatous duodenal polyp      Premium Reason  Non-Premium    Comments:

## 2021-06-03 NOTE — ANESTHESIA PROCEDURE NOTES
Airway    Date/Time: 6/3/2021 8:50 AM  Performed by: Paul Edmonds M.D.  Authorized by: Paul Edmonds M.D.     Location:  OR  Urgency:  Elective  Indications for Airway Management:  Anesthesia      Spontaneous Ventilation: absent    Sedation Level:  Deep  Preoxygenated: Yes    Patient Position:  Sniffing  Final Airway Type:  Endotracheal airway  Final Endotracheal Airway:  ETT  Cuffed: Yes    Technique Used for Successful ETT Placement:  Direct laryngoscopy    Insertion Site:  Oral  Blade Type:  Kevin  Laryngoscope Blade/Videolaryngoscope Blade Size:  3  ETT Size (mm):  7.0  Measured from:  Teeth  ETT to Teeth (cm):  21  Placement Verified by: auscultation and capnometry    Cormack-Lehane Classification:  Grade IIa - partial view of glottis  Number of Attempts at Approach:  1

## 2021-06-03 NOTE — ANESTHESIA POSTPROCEDURE EVALUATION
Patient: Sara Knight    Procedure Summary     Date: 06/03/21 Room / Location:  ENDOSCOPIC ULTRASOUND ROOM / SURGERY [facility]    Anesthesia Start: 0845 Anesthesia Stop: 0929    Procedures:       GASTROSCOPY      EGD, WITH ENDOSCOPIC US      EGD, WITH ASPIRATION BIOPSY Diagnosis: (DUODENAL ADENOMA WITH LOW GRADE DYSPLASIA; pending pathology)    Surgeons: aJime Starr M.D. Responsible Provider: Paul Edmonds M.D.    Anesthesia Type: general ASA Status: 3          Final Anesthesia Type: general  Last vitals  BP   Blood Pressure : 148/77    Temp   36.1 °C (97 °F)    Pulse   (!) 57   Resp   15    SpO2   92 %      Anesthesia Post Evaluation    Patient location during evaluation: PACU  Patient participation: complete - patient participated  Level of consciousness: awake and alert  Pain score: 1    Airway patency: patent  Anesthetic complications: no  Cardiovascular status: hemodynamically stable  Respiratory status: acceptable  Hydration status: euvolemic    PONV: none          No complications documented.     Nurse Pain Score: 0 (NPRS)

## 2021-06-03 NOTE — ANESTHESIA PREPROCEDURE EVALUATION
Relevant Problems   CARDIAC   (positive) Essential hypertension   (positive) Paroxysmal atrial fibrillation (HCC)      GI   (positive) Gastroesophageal reflux disease without esophagitis       Physical Exam    Airway   Mallampati: I  TM distance: >3 FB  Neck ROM: full       Cardiovascular - normal exam  Rhythm: regular  Rate: normal  (-) murmur     Dental - normal exam           Pulmonary - normal exam  Breath sounds clear to auscultation     Abdominal    Neurological - normal exam                 Anesthesia Plan    ASA 3       Plan - general       Airway plan will be ETT          Induction: intravenous    Postoperative Plan: Postoperative administration of opioids is intended.    Pertinent diagnostic labs and testing reviewed    Informed Consent:    Anesthetic plan and risks discussed with patient.    Use of blood products discussed with: patient whom consented to blood products.

## 2021-06-03 NOTE — DISCHARGE INSTRUCTIONS
ENDOSCOPY HOME CARE INSTRUCTIONS    GASTROSCOPY OR ERCP  1. Don't eat or drink anything for about an hour after the test. You can then resume your regular diet.  2. Don't drive or drink alcohol for 24 hours. The medication you received will make you too drowsy.  3. Don't take any coffee, tea, or aspirin products until after you see your doctor. These can harm the lining of your stomach.  4. If you begin to vomit bloody material, or develop black or bloody stools, call your doctor as soon as possible.  5. If you have any neck, chest, abdominal pain or temp of 100 degrees, call your doctor.  6. See your doctor ***  7. Additional instructions: ***  8. Prescriptions: ***    Dr. Starr [phone]    You should call 911 if you develop problems with breathing or chest pain.  If any questions arise, call your doctor. If your doctor is not available, please feel free to call [phone]. You can also call the HEALTH HOTLINE open 24 hours/day, 7 days/week and speak to a nurse at [phone], or toll free [phone].    Depression / Suicide Risk    As you are discharged from this [facility] facility, it is important to learn how to keep safe from harming yourself.    Recognize the warning signs:  · Abrupt changes in personality, positive or negative- including increase in energy   · Giving away possessions  · Change in eating patterns- significant weight changes-  positive or negative  · Change in sleeping patterns- unable to sleep or sleeping all the time   · Unwillingness or inability to communicate  · Depression  · Unusual sadness, discouragement and loneliness  · Talk of wanting to die  · Neglect of personal appearance   · Rebelliousness- reckless behavior  · Withdrawal from people/activities they love  · Confusion- inability to concentrate     If you or a loved one observes any of these behaviors or has concerns about self-harm, here's what you can do:  · Talk about it- your feelings and reasons for  harming yourself  · Remove any means that you might use to hurt yourself (examples: pills, rope, extension cords, firearm)  · Get professional help from the community (Mental Health, Substance Abuse, psychological counseling)  · Do not be alone:Call your Safe Contact- someone whom you trust who will be there for you.  · Call your local CRISIS HOTLINE 828-3312 or 219-591-7494  · Call your local Children's Mobile Crisis Response Team [place] (892) 041-0441 or www.[employer]  · Call the toll free National Suicide Prevention Hotlines   · National Suicide Prevention Lifeline 353-016-UUHG (4768)  · National Hope Line Network 800-SUICIDE (252-8055)    I acknowledge receipt and understanding of these Home Care Instructions.

## 2021-06-03 NOTE — OR NURSING
1039: Pt from PACU/post endo procedure, RN report, Pt settled into recliner/dressed/steady to bathroom/+void, VSS, Denies pain/nausea  1051: Awaiting family  1052: IV removed, RA sats approp  1102: Hand off to Em RN in Stg II

## 2021-08-05 ENCOUNTER — NON-PROVIDER VISIT (OUTPATIENT)
Dept: SLEEP MEDICINE | Facility: MEDICAL CENTER | Age: 66
End: 2021-08-05
Payer: COMMERCIAL

## 2021-08-05 DIAGNOSIS — J45.40 MODERATE PERSISTENT ASTHMA WITHOUT COMPLICATION: ICD-10-CM

## 2021-08-05 PROCEDURE — 94010 BREATHING CAPACITY TEST: CPT | Performed by: INTERNAL MEDICINE

## 2021-08-05 NOTE — PROCEDURES
Good patient effort & cooperation. The results of this test meet the ATS standards for acceptability and repeatability. Test was performed on the [employer]/D spirometry system. Predicted values were GLI-2012.    Impression:  FEV1 is 1.88 L which is 70% of predicted.  FEV1 FVC ratio is 70%.  The study is compatible with mild obstructive lung disease.  A restrictive process cannot be ruled out without measurement of lung volumes.

## 2021-10-26 ENCOUNTER — OFFICE VISIT (OUTPATIENT)
Dept: SLEEP MEDICINE | Facility: MEDICAL CENTER | Age: 66
End: 2021-10-26
Payer: COMMERCIAL

## 2021-10-26 VITALS
RESPIRATION RATE: 16 BRPM | DIASTOLIC BLOOD PRESSURE: 80 MMHG | HEART RATE: 56 BPM | OXYGEN SATURATION: 94 % | WEIGHT: 249.6 LBS | BODY MASS INDEX: 36.97 KG/M2 | SYSTOLIC BLOOD PRESSURE: 120 MMHG | HEIGHT: 69 IN

## 2021-10-26 DIAGNOSIS — J45.40 MODERATE PERSISTENT ASTHMA WITHOUT COMPLICATION: ICD-10-CM

## 2021-10-26 DIAGNOSIS — J30.9 ALLERGIC RHINITIS: ICD-10-CM

## 2021-10-26 PROCEDURE — 99213 OFFICE O/P EST LOW 20 MIN: CPT | Performed by: NURSE PRACTITIONER

## 2021-10-26 RX ORDER — PREDNISONE 10 MG/1
TABLET ORAL
Qty: 18 TABLET | Refills: 2 | Status: SHIPPED | OUTPATIENT
Start: 2021-10-26 | End: 2022-04-18

## 2021-10-26 RX ORDER — ATORVASTATIN CALCIUM 20 MG/1
20 TABLET, FILM COATED ORAL DAILY
COMMUNITY
Start: 2021-10-08 | End: 2021-10-26

## 2021-10-26 RX ORDER — BUDESONIDE AND FORMOTEROL FUMARATE DIHYDRATE 80; 4.5 UG/1; UG/1
2 AEROSOL RESPIRATORY (INHALATION) 2 TIMES DAILY
Qty: 3 EACH | Refills: 4 | Status: SHIPPED | OUTPATIENT
Start: 2021-10-26 | End: 2023-08-21

## 2021-10-26 RX ORDER — AMOXICILLIN AND CLAVULANATE POTASSIUM 875; 125 MG/1; MG/1
TABLET, FILM COATED ORAL
COMMUNITY
Start: 2021-08-12 | End: 2022-04-18

## 2021-10-26 RX ORDER — AZITHROMYCIN 250 MG/1
TABLET, FILM COATED ORAL
Qty: 6 TABLET | Refills: 0 | Status: SHIPPED | OUTPATIENT
Start: 2021-10-26 | End: 2022-04-18

## 2021-10-26 RX ORDER — MONTELUKAST SODIUM 10 MG/1
TABLET ORAL
Qty: 90 TABLET | Refills: 4 | Status: SHIPPED | OUTPATIENT
Start: 2021-10-26 | End: 2022-11-09 | Stop reason: SDUPTHER

## 2021-10-26 RX ORDER — ALBUTEROL SULFATE 90 UG/1
2 AEROSOL, METERED RESPIRATORY (INHALATION) EVERY 6 HOURS PRN
Qty: 25.5 G | Refills: 4 | Status: SHIPPED | OUTPATIENT
Start: 2021-10-26

## 2021-10-26 NOTE — PATIENT INSTRUCTIONS
1.  Continue current regimen of Symbicort and albuterol as needed along with Singulair.  Refills placed at patient's request through mail order pharmacy.  Also prescribed 30 mg prednisone taper and Z-Silverio to be taken for change of cough or productive sputum.  Reviewed spirometry which does show an obstruction similar to asthma.  Unfortunately no bronchodilator was administered so we could not document response.  Follow-up will be 6 months or sooner if needed.  Will update PFTs annually.  Reach out via MyChart or phone with any questions or concerns before next appointment.

## 2021-10-26 NOTE — PROGRESS NOTES
Chief Complaint   Patient presents with   • Follow-Up     asthma       HPI:  Sara Knight is a 65 y.o. year old female here today for follow-up on asthma.  Last seen 3/2/2021 by Dr. Yip.  Patient significant medical history includes GERD, A. fib on anticoagulation, osteoarthritis, and asthma.  She is a lifelong non-smoker. Typical triggers for her asthma include hairspray, perfume, pollen, acid reflux, and wildfire smoke.  She reports symptoms being well controlled today on Symbicort 80, montelukast and proair.    Currently she states she has an infrequent cough with wheezing or chest tightness that occur approximately 1 time per month.  She states her symptoms have been in control since the environmental smoke has dissipated.    Current regimen includes Symbicort, and ProAir as needed.  Is on Dexilant for GERD, and also metoprolol and Xarelto for A. fib.  She also takes Singulair for allergies.      PFT (8/5/2021):  FEV1 is 1.88 L which is 70% of predicted.  FEV1 FVC ratio is 70%. The study is compatible with mild obstructive lung disease.  A restrictive process cannot be ruled out without measurement of lung volumes.    ROS: As per HPI and otherwise negative if not stated.    Past Medical History:   Diagnosis Date   • Arrhythmia     Afib   • Arthritis     osteo   • Asthma     daily inhalor   • GERD (gastroesophageal reflux disease)    • Hypertension    • Indigestion    • PONV (postoperative nausea and vomiting)     with codiene and opiates       Past Surgical History:   Procedure Laterality Date   • PB UPPER GI ENDOSCOPY,DIAGNOSIS  6/3/2021    Procedure: GASTROSCOPY;  Surgeon: Jaime Starr M.D.;  Location: [facility];  Service: EUS   • EGD W/ENDOSCOPIC ULTRASOUND  6/3/2021    Procedure: EGD, WITH ENDOSCOPIC US;  Surgeon: Jaime Starr M.D.;  Location: [facility];  Service: EUS   • EGD WITH ASP/BX  6/3/2021    Procedure: EGD, WITH ASPIRATION BIOPSY;  Surgeon: Jaime Starr M.D.;   Location: SURGERY [facility];  Service: EUS   • SHOULDER ARTHROSCOPY Left 2019   • SHOULDER ARTHROSCOPY Right 2018   • CHOLECYSTECTOMY  2017   • GASTRIC BANDING LAPAROSCOPIC  2008   • OTHER ORTHOPEDIC SURGERY Right 1998    tarsal fusion    • OTHER ORTHOPEDIC SURGERY Right     foot surgery   • TONSILLECTOMY      as a child       History reviewed. No pertinent family history.    Social History     Socioeconomic History   • Marital status:      Spouse name: Not on file   • Number of children: Not on file   • Years of education: Not on file   • Highest education level: Not on file   Occupational History   • Not on file   Tobacco Use   • Smoking status: Never Smoker   • Smokeless tobacco: Never Used   Vaping Use   • Vaping Use: Never used   Substance and Sexual Activity   • Alcohol use: Never   • Drug use: Never   • Sexual activity: Not on file   Other Topics Concern   • Not on file   Social History Narrative   • Not on file     Social Determinants of Health     Financial Resource Strain:    • Difficulty of Paying Living Expenses:    Food Insecurity:    • Worried About Running Out of Food in the Last Year:    • Ran Out of Food in the Last Year:    Transportation Needs:    • Lack of Transportation (Medical):    • Lack of Transportation (Non-Medical):    Physical Activity:    • Days of Exercise per Week:    • Minutes of Exercise per Session:    Stress:    • Feeling of Stress :    Social Connections:    • Frequency of Communication with Friends and Family:    • Frequency of Social Gatherings with Friends and Family:    • Attends Buddhist Services:    • Active Member of Clubs or Organizations:    • Attends Club or Organization Meetings:    • Marital Status:    Intimate Partner Violence:    • Fear of Current or Ex-Partner:    • Emotionally Abused:    • Physically Abused:    • Sexually Abused:        Allergies as of 10/26/2021 - Reviewed 10/26/2021   Allergen Reaction Noted   • Hydrocodone  03/02/2021  "       Vitals:  /80 (BP Location: Left arm, Patient Position: Sitting, BP Cuff Size: Large adult)   Pulse (!) 56   Resp 16   Ht 1.753 m (5' 9\")   Wt 113 kg (249 lb 9.6 oz)   SpO2 94%     Current medications as of today   Current Outpatient Medications   Medication Sig Dispense Refill   • rivaroxaban (XARELTO) 20 MG Tab tablet Take 20 mg by mouth.     • montelukast (SINGULAIR) 10 MG Tab TAKE 1 TABLET BY MOUTH IN THE EVENING 90 Tablet 4   • budesonide-formoterol (SYMBICORT) 80-4.5 MCG/ACT Aerosol Inhale 2 Puffs 2 times a day. 3 Each 4   • albuterol (PROAIR HFA) 108 (90 Base) MCG/ACT Aero Soln inhalation aerosol Inhale 2 Puffs every 6 hours as needed for Shortness of Breath. 25.5 g 4   • azithromycin (ZITHROMAX) 250 MG Tab Take 2 tablets on day 1, then take 1 tablet a day for 4 days. 6 Tablet 0   • predniSONE (DELTASONE) 10 MG Tab Take 30mg x 3 days, then take 20mg x 3 days, then take 10mg x 3 days, with food, then discontinue. 18 Tablet 2   • diclofenac sodium 1 % Gel APPLY (2G) BY TOPICAL ROUTE 3 TIMES EVERY DAY TO THE AFFECTED JOINTS AS NEEDED     • Coenzyme Q10 (CO Q-10) 200 MG Cap Take  by mouth every day.     • Cyanocobalamin (VITAMIN B-12 PO) Take  by mouth every day.     • losartan (COZAAR) 100 MG Tab Take 100 mg by mouth every day.     • atorvastatin (LIPITOR) 20 MG Tab Take 20 mg by mouth every evening.     • Dexlansoprazole (DEXILANT) 60 MG CAPSULE DELAYED RELEASE delayed-release capsule Take 60 mg by mouth every day. afternoon     • METOPROLOL SUCCINATE PO Take 12.5 mg by mouth every day.     • famotidine (PEPCID) 20 MG Tab Take 20 mg by mouth 2 times a day.     • Magnesium 400 MG Cap Take 1 capsule by mouth every day.     • Cholecalciferol (VITAMIN D3 PO) Take 5,000 Units by mouth every day.     • Multiple Vitamins-Minerals (ZINC PO) Take  by mouth every day.     • amoxicillin-clavulanate (AUGMENTIN) 875-125 MG Tab  (Patient not taking: Reported on 10/26/2021)       No current " facility-administered medications for this visit.         Physical Exam:   Gen:           Alert and oriented, No apparent distress. Mood and affect appropriate, normal interaction with examiner.  Eyes:          PERRL, EOM intact, sclere white, conjunctive moist.  Ears:          Not examined. No lesions or deformities.  Hearing:     Grossly intact.  Nose:          Normal, no lesions or deformities.  Dentition:    Good dentition.  Oropharynx:   Tongue normal, posterior pharynx without erythema or exudate.  Neck:        Supple, trachea midline, no masses.  Respiratory Effort: No intercostal retractions or use of accessory muscles.   Lung Auscultation:      Clear to auscultation bilaterally; no rales, rhonchi or wheezing.  CV:            Regular rate and rhythm. No murmurs, rubs or gallops.  Abd:           Not examined. Soft non tender, non distended. Normal active bowel sounds. No masses.  Lymphadenopathy: No palpable nodes or edema.  Gait and Station: Normal.  Digits and Nails: No clubbing, cyanosis, petechiae, or nodes.   Cranial Nerves: II-XII grossly intact.  Skin:        No rashes, lesions or ulcers noted.               Ext:           No cyanosis or edema.      Assessment:  1. Moderate persistent asthma without complication     2. Allergic rhinitis  montelukast (SINGULAIR) 10 MG Tab       Immunizations:    Flu: 10/21/2021  Pneumovax 23: 7/9/2021  COVID-19: 2/23/2021, 3/25/2021, 9/8/2021    Plan:  1.  Continue current regimen of Symbicort and albuterol as needed along with Singulair.  Refills placed at patient's request through mail order pharmacy.  Also prescribed 30 mg prednisone taper and Z-Silverio to be taken for change of cough or productive sputum.  Reviewed spirometry which does show an obstruction similar to asthma.  Unfortunately no bronchodilator was administered so we could not document response.  Follow-up will be 6 months or sooner if needed.  Will update PFTs annually.  Reach out via [employer] or phone with  any questions or concerns before next appointment.    Please note that this dictation was created using voice recognition software. I have made every reasonable attempt to correct obvious errors, but it is possible there are errors of grammar and possibly content that I did not discover before finalizing the note.

## 2022-02-15 ENCOUNTER — HOSPITAL ENCOUNTER (OUTPATIENT)
Dept: RADIOLOGY | Facility: MEDICAL CENTER | Age: 67
End: 2022-02-15
Attending: FAMILY MEDICINE
Payer: COMMERCIAL

## 2022-02-15 DIAGNOSIS — N95.9 MENOPAUSAL DISORDER: ICD-10-CM

## 2022-02-15 PROCEDURE — 77080 DXA BONE DENSITY AXIAL: CPT

## 2022-03-04 ENCOUNTER — HOSPITAL ENCOUNTER (OUTPATIENT)
Dept: RADIOLOGY | Facility: MEDICAL CENTER | Age: 67
End: 2022-03-04
Attending: NURSE PRACTITIONER
Payer: COMMERCIAL

## 2022-03-04 DIAGNOSIS — R11.2 NAUSEA AND VOMITING, INTRACTABILITY OF VOMITING NOT SPECIFIED, UNSPECIFIED VOMITING TYPE: ICD-10-CM

## 2022-03-04 DIAGNOSIS — K21.9 GASTROESOPHAGEAL REFLUX DISEASE, UNSPECIFIED WHETHER ESOPHAGITIS PRESENT: ICD-10-CM

## 2022-03-04 DIAGNOSIS — R10.9 ABDOMINAL PAIN, UNSPECIFIED ABDOMINAL LOCATION: ICD-10-CM

## 2022-03-04 DIAGNOSIS — K92.89 GAS BLOAT SYNDROME: ICD-10-CM

## 2022-03-04 PROCEDURE — 74240 X-RAY XM UPR GI TRC 1CNTRST: CPT

## 2022-03-31 ENCOUNTER — PRE-ADMISSION TESTING (OUTPATIENT)
Dept: ADMISSIONS | Facility: MEDICAL CENTER | Age: 67
End: 2022-03-31
Attending: SURGERY
Payer: COMMERCIAL

## 2022-03-31 DIAGNOSIS — Z01.812 PRE-OPERATIVE LABORATORY EXAMINATION: ICD-10-CM

## 2022-03-31 DIAGNOSIS — Z01.810 PRE-OPERATIVE CARDIOVASCULAR EXAMINATION: ICD-10-CM

## 2022-03-31 LAB
ANION GAP SERPL CALC-SCNC: 11 MMOL/L (ref 7–16)
BUN SERPL-MCNC: 17 MG/DL (ref 8–22)
CALCIUM SERPL-MCNC: 9.5 MG/DL (ref 8.5–10.5)
CHLORIDE SERPL-SCNC: 105 MMOL/L (ref 96–112)
CO2 SERPL-SCNC: 26 MMOL/L (ref 20–33)
CREAT SERPL-MCNC: 1.03 MG/DL (ref 0.5–1.4)
EKG IMPRESSION: NORMAL
ERYTHROCYTE [DISTWIDTH] IN BLOOD BY AUTOMATED COUNT: 47.9 FL (ref 35.9–50)
GFR SERPLBLD CREATININE-BSD FMLA CKD-EPI: 60 ML/MIN/1.73 M 2
GLUCOSE SERPL-MCNC: 99 MG/DL (ref 65–99)
HCT VFR BLD AUTO: 42.5 % (ref 37–47)
HGB BLD-MCNC: 12.9 G/DL (ref 12–16)
MCH RBC QN AUTO: 26.2 PG (ref 27–33)
MCHC RBC AUTO-ENTMCNC: 30.4 G/DL (ref 33.6–35)
MCV RBC AUTO: 86.2 FL (ref 81.4–97.8)
PLATELET # BLD AUTO: 283 K/UL (ref 164–446)
PMV BLD AUTO: 10.6 FL (ref 9–12.9)
POTASSIUM SERPL-SCNC: 4.1 MMOL/L (ref 3.6–5.5)
RBC # BLD AUTO: 4.93 M/UL (ref 4.2–5.4)
SODIUM SERPL-SCNC: 142 MMOL/L (ref 135–145)
WBC # BLD AUTO: 7.1 K/UL (ref 4.8–10.8)

## 2022-03-31 PROCEDURE — 93010 ELECTROCARDIOGRAM REPORT: CPT | Performed by: INTERNAL MEDICINE

## 2022-03-31 PROCEDURE — 36415 COLL VENOUS BLD VENIPUNCTURE: CPT

## 2022-03-31 PROCEDURE — 93005 ELECTROCARDIOGRAM TRACING: CPT

## 2022-03-31 PROCEDURE — 80048 BASIC METABOLIC PNL TOTAL CA: CPT

## 2022-03-31 PROCEDURE — 85027 COMPLETE CBC AUTOMATED: CPT

## 2022-03-31 RX ORDER — PROGESTERONE 100 MG/1
100 CAPSULE ORAL EVERY EVENING
COMMUNITY
End: 2023-03-07

## 2022-04-20 ENCOUNTER — ANESTHESIA EVENT (OUTPATIENT)
Dept: SURGERY | Facility: MEDICAL CENTER | Age: 67
End: 2022-04-20
Payer: COMMERCIAL

## 2022-04-21 ENCOUNTER — ANESTHESIA (OUTPATIENT)
Dept: SURGERY | Facility: MEDICAL CENTER | Age: 67
End: 2022-04-21
Payer: COMMERCIAL

## 2022-04-21 ENCOUNTER — HOSPITAL ENCOUNTER (OUTPATIENT)
Facility: MEDICAL CENTER | Age: 67
End: 2022-04-21
Attending: SURGERY | Admitting: SURGERY
Payer: COMMERCIAL

## 2022-04-21 VITALS
TEMPERATURE: 97.8 F | WEIGHT: 249.56 LBS | DIASTOLIC BLOOD PRESSURE: 60 MMHG | SYSTOLIC BLOOD PRESSURE: 145 MMHG | OXYGEN SATURATION: 91 % | BODY MASS INDEX: 37.82 KG/M2 | RESPIRATION RATE: 18 BRPM | HEART RATE: 57 BPM | HEIGHT: 68 IN

## 2022-04-21 DIAGNOSIS — G89.18 POSTOPERATIVE PAIN: ICD-10-CM

## 2022-04-21 LAB
GLUCOSE BLD STRIP.AUTO-MCNC: 80 MG/DL (ref 65–99)
PATHOLOGY CONSULT NOTE: NORMAL

## 2022-04-21 PROCEDURE — 700101 HCHG RX REV CODE 250: Performed by: SURGERY

## 2022-04-21 PROCEDURE — 700102 HCHG RX REV CODE 250 W/ 637 OVERRIDE(OP): Performed by: STUDENT IN AN ORGANIZED HEALTH CARE EDUCATION/TRAINING PROGRAM

## 2022-04-21 PROCEDURE — 501570 HCHG TROCAR, SEPARATOR: Performed by: SURGERY

## 2022-04-21 PROCEDURE — A9270 NON-COVERED ITEM OR SERVICE: HCPCS | Performed by: STUDENT IN AN ORGANIZED HEALTH CARE EDUCATION/TRAINING PROGRAM

## 2022-04-21 PROCEDURE — 82962 GLUCOSE BLOOD TEST: CPT

## 2022-04-21 PROCEDURE — 160048 HCHG OR STATISTICAL LEVEL 1-5: Performed by: SURGERY

## 2022-04-21 PROCEDURE — 00797 ANES IPER UPR ABD GSTR PX MO: CPT | Performed by: STUDENT IN AN ORGANIZED HEALTH CARE EDUCATION/TRAINING PROGRAM

## 2022-04-21 PROCEDURE — 500800 HCHG LAPAROSCOPIC J/L HOOK: Performed by: SURGERY

## 2022-04-21 PROCEDURE — 160041 HCHG SURGERY MINUTES - EA ADDL 1 MIN LEVEL 4: Performed by: SURGERY

## 2022-04-21 PROCEDURE — 160029 HCHG SURGERY MINUTES - 1ST 30 MINS LEVEL 4: Performed by: SURGERY

## 2022-04-21 PROCEDURE — 501838 HCHG SUTURE GENERAL: Performed by: SURGERY

## 2022-04-21 PROCEDURE — 160009 HCHG ANES TIME/MIN: Performed by: SURGERY

## 2022-04-21 PROCEDURE — 160035 HCHG PACU - 1ST 60 MINS PHASE I: Performed by: SURGERY

## 2022-04-21 PROCEDURE — 700101 HCHG RX REV CODE 250: Performed by: STUDENT IN AN ORGANIZED HEALTH CARE EDUCATION/TRAINING PROGRAM

## 2022-04-21 PROCEDURE — 160002 HCHG RECOVERY MINUTES (STAT): Performed by: SURGERY

## 2022-04-21 PROCEDURE — 160025 RECOVERY II MINUTES (STATS): Performed by: SURGERY

## 2022-04-21 PROCEDURE — 501571 HCHG TROCAR, SEPARATOR 12X100: Performed by: SURGERY

## 2022-04-21 PROCEDURE — 502570 HCHG PACK, GASTRIC BANDING: Performed by: SURGERY

## 2022-04-21 PROCEDURE — 700105 HCHG RX REV CODE 258: Performed by: SURGERY

## 2022-04-21 PROCEDURE — 160036 HCHG PACU - EA ADDL 30 MINS PHASE I: Performed by: SURGERY

## 2022-04-21 PROCEDURE — 88300 SURGICAL PATH GROSS: CPT

## 2022-04-21 PROCEDURE — 700111 HCHG RX REV CODE 636 W/ 250 OVERRIDE (IP): Performed by: STUDENT IN AN ORGANIZED HEALTH CARE EDUCATION/TRAINING PROGRAM

## 2022-04-21 PROCEDURE — 160046 HCHG PACU - 1ST 60 MINS PHASE II: Performed by: SURGERY

## 2022-04-21 RX ORDER — ACETAMINOPHEN 325 MG/1
650 TABLET ORAL EVERY 6 HOURS
Qty: 60 TABLET | Refills: 0 | Status: SHIPPED | OUTPATIENT
Start: 2022-04-21 | End: 2022-05-16

## 2022-04-21 RX ORDER — IBUPROFEN 600 MG/1
600 TABLET ORAL EVERY 6 HOURS
Qty: 45 TABLET | Refills: 0 | Status: SHIPPED | OUTPATIENT
Start: 2022-04-21 | End: 2022-05-16

## 2022-04-21 RX ORDER — DIPHENHYDRAMINE HYDROCHLORIDE 50 MG/ML
25 INJECTION INTRAMUSCULAR; INTRAVENOUS EVERY 6 HOURS PRN
Status: CANCELLED | OUTPATIENT
Start: 2022-04-21

## 2022-04-21 RX ORDER — BUPIVACAINE HYDROCHLORIDE AND EPINEPHRINE 5; 5 MG/ML; UG/ML
INJECTION, SOLUTION PERINEURAL
Status: DISCONTINUED | OUTPATIENT
Start: 2022-04-21 | End: 2022-04-21 | Stop reason: HOSPADM

## 2022-04-21 RX ORDER — ROCURONIUM BROMIDE 10 MG/ML
INJECTION, SOLUTION INTRAVENOUS PRN
Status: DISCONTINUED | OUTPATIENT
Start: 2022-04-21 | End: 2022-04-21 | Stop reason: SURG

## 2022-04-21 RX ORDER — SCOLOPAMINE TRANSDERMAL SYSTEM 1 MG/1
PATCH, EXTENDED RELEASE TRANSDERMAL
Status: COMPLETED
Start: 2022-04-21 | End: 2022-04-21

## 2022-04-21 RX ORDER — ACETAMINOPHEN 500 MG
1000 TABLET ORAL ONCE
Status: DISCONTINUED | OUTPATIENT
Start: 2022-04-21 | End: 2022-04-21 | Stop reason: HOSPADM

## 2022-04-21 RX ORDER — CEFAZOLIN SODIUM 1 G/3ML
INJECTION, POWDER, FOR SOLUTION INTRAMUSCULAR; INTRAVENOUS PRN
Status: DISCONTINUED | OUTPATIENT
Start: 2022-04-21 | End: 2022-04-21 | Stop reason: SURG

## 2022-04-21 RX ORDER — ONDANSETRON 2 MG/ML
INJECTION INTRAMUSCULAR; INTRAVENOUS PRN
Status: DISCONTINUED | OUTPATIENT
Start: 2022-04-21 | End: 2022-04-21 | Stop reason: SURG

## 2022-04-21 RX ORDER — DIPHENHYDRAMINE HYDROCHLORIDE 50 MG/ML
12.5 INJECTION INTRAMUSCULAR; INTRAVENOUS
Status: DISCONTINUED | OUTPATIENT
Start: 2022-04-21 | End: 2022-04-21 | Stop reason: HOSPADM

## 2022-04-21 RX ORDER — ONDANSETRON 2 MG/ML
4 INJECTION INTRAMUSCULAR; INTRAVENOUS
Status: DISCONTINUED | OUTPATIENT
Start: 2022-04-21 | End: 2022-04-21 | Stop reason: HOSPADM

## 2022-04-21 RX ORDER — KETOROLAC TROMETHAMINE 30 MG/ML
INJECTION, SOLUTION INTRAMUSCULAR; INTRAVENOUS PRN
Status: DISCONTINUED | OUTPATIENT
Start: 2022-04-21 | End: 2022-04-21 | Stop reason: SURG

## 2022-04-21 RX ORDER — DEXAMETHASONE SODIUM PHOSPHATE 4 MG/ML
INJECTION, SOLUTION INTRA-ARTICULAR; INTRALESIONAL; INTRAMUSCULAR; INTRAVENOUS; SOFT TISSUE PRN
Status: DISCONTINUED | OUTPATIENT
Start: 2022-04-21 | End: 2022-04-21 | Stop reason: SURG

## 2022-04-21 RX ORDER — TRAMADOL HYDROCHLORIDE 50 MG/1
50 TABLET ORAL EVERY 4 HOURS PRN
Qty: 30 TABLET | Refills: 0 | Status: SHIPPED | OUTPATIENT
Start: 2022-04-21 | End: 2022-04-28

## 2022-04-21 RX ORDER — POLYETHYLENE GLYCOL 3350 17 G/17G
17 POWDER, FOR SOLUTION ORAL DAILY
Qty: 20 EACH | Refills: 0 | Status: SHIPPED | OUTPATIENT
Start: 2022-04-21 | End: 2022-07-27

## 2022-04-21 RX ORDER — HYDROMORPHONE HYDROCHLORIDE 1 MG/ML
0.4 INJECTION, SOLUTION INTRAMUSCULAR; INTRAVENOUS; SUBCUTANEOUS
Status: DISCONTINUED | OUTPATIENT
Start: 2022-04-21 | End: 2022-04-21 | Stop reason: HOSPADM

## 2022-04-21 RX ORDER — MIDAZOLAM HYDROCHLORIDE 1 MG/ML
INJECTION INTRAMUSCULAR; INTRAVENOUS PRN
Status: DISCONTINUED | OUTPATIENT
Start: 2022-04-21 | End: 2022-04-21 | Stop reason: SURG

## 2022-04-21 RX ORDER — LIDOCAINE HYDROCHLORIDE 20 MG/ML
INJECTION, SOLUTION EPIDURAL; INFILTRATION; INTRACAUDAL; PERINEURAL PRN
Status: DISCONTINUED | OUTPATIENT
Start: 2022-04-21 | End: 2022-04-21 | Stop reason: SURG

## 2022-04-21 RX ORDER — SUCCINYLCHOLINE CHLORIDE 20 MG/ML
INJECTION INTRAMUSCULAR; INTRAVENOUS PRN
Status: DISCONTINUED | OUTPATIENT
Start: 2022-04-21 | End: 2022-04-21 | Stop reason: SURG

## 2022-04-21 RX ORDER — SODIUM CHLORIDE, SODIUM LACTATE, POTASSIUM CHLORIDE, CALCIUM CHLORIDE 600; 310; 30; 20 MG/100ML; MG/100ML; MG/100ML; MG/100ML
INJECTION, SOLUTION INTRAVENOUS CONTINUOUS
Status: ACTIVE | OUTPATIENT
Start: 2022-04-21 | End: 2022-04-21

## 2022-04-21 RX ORDER — HYDROMORPHONE HYDROCHLORIDE 1 MG/ML
0.1 INJECTION, SOLUTION INTRAMUSCULAR; INTRAVENOUS; SUBCUTANEOUS
Status: DISCONTINUED | OUTPATIENT
Start: 2022-04-21 | End: 2022-04-21 | Stop reason: HOSPADM

## 2022-04-21 RX ORDER — SCOLOPAMINE TRANSDERMAL SYSTEM 1 MG/1
PATCH, EXTENDED RELEASE TRANSDERMAL PRN
Status: DISCONTINUED | OUTPATIENT
Start: 2022-04-21 | End: 2022-04-21 | Stop reason: SURG

## 2022-04-21 RX ORDER — DIPHENHYDRAMINE HCL 25 MG
25 TABLET ORAL EVERY 6 HOURS PRN
Status: CANCELLED | OUTPATIENT
Start: 2022-04-21

## 2022-04-21 RX ORDER — HALOPERIDOL 5 MG/ML
1 INJECTION INTRAMUSCULAR
Status: DISCONTINUED | OUTPATIENT
Start: 2022-04-21 | End: 2022-04-21 | Stop reason: HOSPADM

## 2022-04-21 RX ORDER — HYDROMORPHONE HYDROCHLORIDE 1 MG/ML
0.2 INJECTION, SOLUTION INTRAMUSCULAR; INTRAVENOUS; SUBCUTANEOUS
Status: DISCONTINUED | OUTPATIENT
Start: 2022-04-21 | End: 2022-04-21 | Stop reason: HOSPADM

## 2022-04-21 RX ORDER — TRAMADOL HYDROCHLORIDE 50 MG/1
50 TABLET ORAL
Status: COMPLETED | OUTPATIENT
Start: 2022-04-21 | End: 2022-04-21

## 2022-04-21 RX ADMIN — LIDOCAINE HYDROCHLORIDE 0.5 ML: 10 INJECTION, SOLUTION EPIDURAL; INFILTRATION; INTRACAUDAL at 11:02

## 2022-04-21 RX ADMIN — FENTANYL CITRATE 50 MCG: 50 INJECTION, SOLUTION INTRAMUSCULAR; INTRAVENOUS at 14:28

## 2022-04-21 RX ADMIN — SODIUM CHLORIDE, POTASSIUM CHLORIDE, SODIUM LACTATE AND CALCIUM CHLORIDE: 600; 310; 30; 20 INJECTION, SOLUTION INTRAVENOUS at 11:13

## 2022-04-21 RX ADMIN — LIDOCAINE HYDROCHLORIDE 100 MG: 20 INJECTION, SOLUTION EPIDURAL; INFILTRATION; INTRACAUDAL at 14:03

## 2022-04-21 RX ADMIN — ONDANSETRON 4 MG: 2 INJECTION INTRAMUSCULAR; INTRAVENOUS at 14:08

## 2022-04-21 RX ADMIN — EPHEDRINE SULFATE 10 MG: 50 INJECTION, SOLUTION INTRAVENOUS at 14:17

## 2022-04-21 RX ADMIN — PROPOFOL 40 MG: 10 INJECTION, EMULSION INTRAVENOUS at 14:23

## 2022-04-21 RX ADMIN — FENTANYL CITRATE 50 MCG: 50 INJECTION, SOLUTION INTRAMUSCULAR; INTRAVENOUS at 14:08

## 2022-04-21 RX ADMIN — DEXAMETHASONE SODIUM PHOSPHATE 4 MG: 4 INJECTION, SOLUTION INTRA-ARTICULAR; INTRALESIONAL; INTRAMUSCULAR; INTRAVENOUS; SOFT TISSUE at 14:08

## 2022-04-21 RX ADMIN — MIDAZOLAM HYDROCHLORIDE 2 MG: 1 INJECTION, SOLUTION INTRAMUSCULAR; INTRAVENOUS at 13:59

## 2022-04-21 RX ADMIN — TRAMADOL HYDROCHLORIDE 50 MG: 50 TABLET, COATED ORAL at 15:34

## 2022-04-21 RX ADMIN — CEFAZOLIN 2 G: 330 INJECTION, POWDER, FOR SOLUTION INTRAMUSCULAR; INTRAVENOUS at 14:03

## 2022-04-21 RX ADMIN — SUCCINYLCHOLINE CHLORIDE 140 MG: 20 INJECTION, SOLUTION INTRAMUSCULAR; INTRAVENOUS; PARENTERAL at 14:03

## 2022-04-21 RX ADMIN — PROPOFOL 160 MG: 10 INJECTION, EMULSION INTRAVENOUS at 14:03

## 2022-04-21 RX ADMIN — KETOROLAC TROMETHAMINE 30 MG: 30 INJECTION, SOLUTION INTRAMUSCULAR at 14:42

## 2022-04-21 RX ADMIN — SUGAMMADEX 200 MG: 100 INJECTION, SOLUTION INTRAVENOUS at 14:46

## 2022-04-21 RX ADMIN — SCOPALAMINE 1 PATCH: 1 PATCH, EXTENDED RELEASE TRANSDERMAL at 13:45

## 2022-04-21 RX ADMIN — ROCURONIUM BROMIDE 40 MG: 10 INJECTION, SOLUTION INTRAVENOUS at 14:07

## 2022-04-21 RX ADMIN — FENTANYL CITRATE 100 MCG: 50 INJECTION, SOLUTION INTRAMUSCULAR; INTRAVENOUS at 14:03

## 2022-04-21 ASSESSMENT — PAIN DESCRIPTION - PAIN TYPE
TYPE: SURGICAL PAIN
TYPE: CHRONIC PAIN

## 2022-04-21 ASSESSMENT — FIBROSIS 4 INDEX: FIB4 SCORE: 0.93

## 2022-04-21 NOTE — ANESTHESIA PREPROCEDURE EVALUATION
Case: [id number] Date/Time: 04/21/22 1301    Procedure: REMOVAL, GASTRIC BAND AND PORT, LAPAROSCOPIC (N/A Abdomen)    Anesthesia type: General    Pre-op diagnosis: BARRETTS ESOPHAGUS    Location: [facility] OR [facility] / SURGERY [facility]    Surgeons: Driss Louis M.D.        67 yo F w/ hx of HTN, PAF (on Xarelto; last dose on 4/18), asthma, GERD. NPO. Got cardiology clearance on 4/12/22. Had normal stress test earlier this month. Took her maintenance inhalers today along w/ metoprolol. Last dose losartan yest AM. METS >4.  Relevant Problems   CARDIAC   (positive) Essential hypertension   (positive) Paroxysmal atrial fibrillation (HCC)      GI   (positive) Gastroesophageal reflux disease without esophagitis       Physical Exam    Airway   Mallampati: II  TM distance: >3 FB  Neck ROM: full       Cardiovascular - normal exam  Rhythm: regular  Rate: normal  (-) murmur     Dental - normal exam           Pulmonary - normal exam  Breath sounds clear to auscultation     Abdominal   (+) obese     Neurological - normal exam               Anesthesia Plan    ASA 3       Plan - general       Airway plan will be ETT          Induction: intravenous    Postoperative Plan: Postoperative administration of opioids is intended.    Pertinent diagnostic labs and testing reviewed    Informed Consent:    Anesthetic plan and risks discussed with patient.    Use of blood products discussed with: patient whom consented to blood products.

## 2022-04-21 NOTE — DISCHARGE INSTRUCTIONS
D/C instructions:    DIET: Upon discharge from the hospital you may resume your normal preoperative diet. Depending on how you are feeling and whether you have nausea or not, you may wish to stay with a bland diet for the first few days. However, you can advance this as quickly as you feel ready.    ACTIVITIES: After discharge from the hospital, you may resume full routine activities. However, there should be no heavy lifting (greater than 15 pounds) and no strenuous activities until after your follow-up visit. Otherwise, routine activities of daily living are acceptable.    DRIVING: If you drive, you may drive whenever you are off pain medications and are able to perform the activities needed to drive, i.e. turning, bending, twisting, etc.    BATHING: You may get the wound wet at any time after leaving the hospital. You may shower, but do not submerge in a bath for at least a week. Dressings may come off after 48 hours.    PAIN MEDICATION: You will be given a prescription for pain medication at discharge. Please take these as directed. It is important to remember not to take medications on an empty stomach as this may cause nausea.    CALL IF YOU HAVE: (1) Fevers to more than 1010 F, (2) Unusual chest or leg pain, (3) Drainage or fluid from incision that may be foul smelling, increased tenderness or soreness at the wound or the wound edges are no longer together, redness or swelling at the incision site. Please do not hesitate to call with any other questions.     APPOINTMENT: Contact our office at [phone] for a follow-up appointment in 1 week following your procedure.    If you have any additional questions, please do not hesitate to call the office.    Office address:   [address], [address] [facility] [place] [address]

## 2022-04-21 NOTE — ANESTHESIA POSTPROCEDURE EVALUATION
Patient: Sara Knight    Procedure Summary     Date: 04/21/22 Room / Location: [place] [address] / SURGERY [facility]    Anesthesia Start: 1358 Anesthesia Stop: 1458    Procedure: REMOVAL, GASTRIC BAND AND PORT, LAPAROSCOPIC (N/A Abdomen) Diagnosis: (BARRETTS ESOPHAGUS)    Surgeons: Driss Louis M.D. Responsible Provider: Octavio Gómez M.D.    Anesthesia Type: general ASA Status: 3          Final Anesthesia Type: general  Last vitals  BP   Blood Pressure : 144/64    Temp   36.6 °C (97.8 °F)    Pulse   65   Resp   12    SpO2   98 %      Anesthesia Post Evaluation    Patient location during evaluation: PACU  Patient participation: complete - patient participated  Level of consciousness: awake and alert    Airway patency: patent  Anesthetic complications: no  Cardiovascular status: hemodynamically stable  Respiratory status: acceptable  Hydration status: euvolemic    PONV: none          No complications documented.     Nurse Pain Score: 3 (NPRS)

## 2022-04-21 NOTE — OR NURSING
The pt is awake and oriented. Respirations are regular and easy. Pain is controlled, the pt is comfortable. Incisions across abdomen dry and intact.    Family updated by phone.

## 2022-04-21 NOTE — ANESTHESIA TIME REPORT
Anesthesia Start and Stop Event Times     Date Time Event    4/21/2022 1353 Ready for Procedure     1358 Anesthesia Start     1458 Anesthesia Stop        Responsible Staff  04/21/22    Name Role Begin End    Min CHIARA Gómez M.D. Anesth 1356 [id number]        Overtime Reason:  no overtime (within assigned shift)    Comments:

## 2022-04-21 NOTE — OP REPORT
Operative Report    Date: 4/21/2022    Surgeon: Driss Louis M.D.     Assistant: FESTUS Villatoro    Pre-operative Diagnosis: Complications of Lap Band     Post-operative Diagnosis: Same    Procedure: Laparoscopic LAP-BAND Removal    ASA Classification: III.    Indications: This is a 66 y.o. female who presented with symptoms of Morbid Obesity, here for bariatric surgery.    The indications for a surgical assistant in this surgery were indicated due to complexity of the procedure. Their role included aiding in incision, retraction, holding devices including camera for laparoscopic procedure, and closure of the wound.      Findings: all pieces of band removed including connector on tube    Wound Classification: Class I, I, Clean..    Procedure in detail: The patient was seen and examined in the preoperative holding area.  The risks benefits and alternatives of the procedure were discussed with the patient who wished to proceed with the procedure as described.  The patient was transferred to the operating room placed in supine position and all pressure points were properly padded.  General endotracheal anesthesia was induced and preoperative antibiotics were given per SCIP protocol.  Patient's abdomen was prepped with ChloraPrep and draped in the normal sterile fashion.  A timeout was performed confirming correct patient, correct procedure, and that all necessary equipment was in the room.      We began the procedure by accessing the abdomen.  The 5 mm Optiview port was used to access the abdomen in the epigastric area off midline to the left. Once we entered the abdomen pneumoperitoneum was achieved and maintained at 15 mmHg carbon dioxide throughout the entirety of the case.  Under direct visualization a 5mm and a 12 mm right upper quadrant working port were placed and a 5 mm left upper quadrant working port was placed.  We then placed the Aimee self-retaining liver retraction device allowing us good  view of the stomach and the hiatus.    We began the procedure by transecting the LAP-BAND tubing near the LAP-BAND in the stomach with the ultrasonic device.  We then proceeded with careful dissection of adhesions around the LAP-BAND to identify the clasp of the LAP-BAND.  This was then grasped and opened.  We then carefully remove this from the tunnel of the LAP-BAND and placed this in the left upper quadrant for future retrieval.  We then turned our attention to the fundoplication we performed careful dissection with electrocautery and ultrasonic device to free the adhesions and the fundoplication taking care to avoid the stomach during our dissection.  We then carefully inspected the area of dissection and confirmed hemostasis.  We then grasped the LAP-BAND removed it through the 12 mm port.     The Aimee liver retractor was removed under direct visualization.  The remainder of our ports were removed and the pneumoperitoneum was released.      We then turned our attention to removing the port.  This was palpated and subcutaneous area local was injected over the port and we sharply incised the skin a combination of blunt electrocautery dissection was used to dissect the port free from the subcutaneous tissue this was then grasped, elevated, and then dissected free from the attachments to the fascia.  The port was then withdrawn and then carefully inspected to ensure that we had all components of the port removed from the abdomen.  Skin was closed using subcuticular 4-0 Monocryl.  Dermabond was placed over the wounds.    The patient was awakened from general anesthetic, and was taken to the recovery room in stable condition.    Sponge and needle counts were correct at the end of the case.     Specimen: band components for gross    EBL: 20mL    Dispo: stable, extubated, to PACU    Driss Louis M.D.  [facility]  [phone]

## 2022-05-02 ENCOUNTER — OFFICE VISIT (OUTPATIENT)
Dept: SLEEP MEDICINE | Facility: MEDICAL CENTER | Age: 67
End: 2022-05-02
Payer: COMMERCIAL

## 2022-05-02 VITALS
HEIGHT: 68 IN | HEART RATE: 56 BPM | OXYGEN SATURATION: 96 % | BODY MASS INDEX: 38.16 KG/M2 | SYSTOLIC BLOOD PRESSURE: 132 MMHG | WEIGHT: 251.8 LBS | RESPIRATION RATE: 16 BRPM | DIASTOLIC BLOOD PRESSURE: 78 MMHG

## 2022-05-02 DIAGNOSIS — J45.40 MODERATE PERSISTENT ASTHMA WITHOUT COMPLICATION: ICD-10-CM

## 2022-05-02 PROCEDURE — 99213 OFFICE O/P EST LOW 20 MIN: CPT | Performed by: NURSE PRACTITIONER

## 2022-05-02 RX ORDER — TRAMADOL HYDROCHLORIDE 50 MG/1
TABLET ORAL
COMMUNITY
End: 2022-07-27

## 2022-05-02 RX ORDER — AZITHROMYCIN 250 MG/1
TABLET, FILM COATED ORAL
COMMUNITY
End: 2022-05-16

## 2022-05-02 RX ORDER — PREDNISONE 20 MG/1
TABLET ORAL
COMMUNITY
End: 2022-07-27

## 2022-05-02 ASSESSMENT — FIBROSIS 4 INDEX: FIB4 SCORE: 0.93

## 2022-05-02 ASSESSMENT — PATIENT HEALTH QUESTIONNAIRE - PHQ9: CLINICAL INTERPRETATION OF PHQ2 SCORE: 0

## 2022-05-02 NOTE — PROGRESS NOTES
Chief Complaint   Patient presents with   • Asthma       HPI:  Sara Knight is a 66 y.o. year old female here today for follow-up on asthma.  Last seen by me on 10/26/2021. Patient significant medical history includes GERD, A. fib on anticoagulation, osteoarthritis, and asthma.  She is a lifelong non-smoker. Typical triggers for her asthma include hairspray, perfume, pollen, acid reflux, and wildfire smoke.  She reports symptoms being well controlled today on Symbicort 80, montelukast and proair.    Patient denies any new or worsening dyspnea or shortness of breath.  She is overall doing well.  She has infrequent use of her rescue inhaler.  She uses approximately 2 times monthly.  She has not used the previous rescue pack that I prescribed.  She did have a recent lap band removal and states that her reflux has improved significantly after procedure was completed.  She denies any nighttime symptoms.  She is currently taking Singulair and Symbicort.  She denies any wheezing, chest congestion, or cough at this time.    PFT (8/5/2021):  FEV1 is 1.88 L which is 70% of predicted.  FEV1 FVC ratio is 70%. The study is compatible with mild obstructive lung disease.  A restrictive process cannot be ruled out without measurement of lung volumes.    ROS: As per HPI and otherwise negative if not stated.    Past Medical History:   Diagnosis Date   • Anesthesia     PONV   • Arrhythmia     Afib   • Arthritis     osteo   • Asthma     daily inhalor   • Gavin esophagus    • Diabetes (HCC)     Pre-Diabetes   • GERD (gastroesophageal reflux disease)    • Heart burn    • High cholesterol    • Hypertension    • Indigestion    • Osteoarthritis    • PONV (postoperative nausea and vomiting)     with codiene and opiates       Past Surgical History:   Procedure Laterality Date   • LAP, REMOVE ADJUST NATIVIDAD RESTRICT D* N/A 4/21/2022    Procedure: REMOVAL, GASTRIC BAND AND PORT, LAPAROSCOPIC;  Surgeon: Drsis Louis M.D.;  Location: [facility];  Service: General   • [place] UPPER GI ENDOSCOPY,DIAGNOSIS  6/3/2021    Procedure: GASTROSCOPY;  Surgeon: Jaime Starr M.D.;  Location: SURGERY [facility];  Service: EUS   • EGD W/ENDOSCOPIC ULTRASOUND  6/3/2021    Procedure: EGD, WITH ENDOSCOPIC US;  Surgeon: Jaime Starr M.D.;  Location: SURGERY [facility];  Service: EUS   • EGD WITH ASP/BX  6/3/2021    Procedure: EGD, WITH ASPIRATION BIOPSY;  Surgeon: Jaime Starr M.D.;  Location: SURGERY [facility];  Service: EUS   • SHOULDER ARTHROSCOPY Left 2019   • SHOULDER ARTHROSCOPY Right 2018   • CHOLECYSTECTOMY  2017   • GASTRIC BANDING LAPAROSCOPIC  2008   • OTHER ORTHOPEDIC SURGERY Right 1998    tarsal fusion    • OTHER ORTHOPEDIC SURGERY Right     foot surgery   • TONSILLECTOMY      as a child       No family history on file.    Social History     Socioeconomic History   • Marital status:      Spouse name: Not on file   • Number of children: Not on file   • Years of education: Not on file   • Highest education level: Not on file   Occupational History   • Not on file   Tobacco Use   • Smoking status: Never Smoker   • Smokeless tobacco: Never Used   Vaping Use   • Vaping Use: Never used   Substance and Sexual Activity   • Alcohol use: Never   • Drug use: Never   • Sexual activity: Not on file   Other Topics Concern   • Not on file   Social History Narrative   • Not on file     Social Determinants of Health     Financial Resource Strain: Not on file   Food Insecurity: Not on file   Transportation Needs: Not on file   Physical Activity: Not on file   Stress: Not on file   Social Connections: Not on file   Intimate Partner Violence: Not on file   Housing Stability: Not on file       Allergies as of 05/02/2022 - Reviewed 04/21/2022   Allergen Reaction Noted   • Hydrocodone  03/02/2021   • Soap Shortness of Breath 04/21/2022   • Codeine  04/18/2022   • Oxycodone Itching and Nausea 01/22/2019        Vitals:  There were no vitals taken for this  visit.    Current medications as of today   Current Outpatient Medications   Medication Sig Dispense Refill   • NON SPECIFIED Apply 1 Each topically every day. Estradiol Cream to forearm compound from [place]     • acetaminophen (TYLENOL) 325 MG Tab Take 2 Tablets by mouth every 6 hours. Alternate w/ ibuprofen to take a medication every 3 hrs, take scheduled for 3 days post-op then PRN 60 Tablet 0   • ibuprofen (MOTRIN) 600 MG Tab Take 1 Tablet by mouth every 6 hours. Alternate w/ tylenol to take a medication every 3 hrs, take scheduled for 3 days post-op then PRN 45 Tablet 0   • polyethylene glycol/lytes (MIRALAX) 17 g Pack Take 1 Packet by mouth every day. While taking narcotics, hold if greater then 3 BMs a day 20 Each 0   • fluticasone (FLONASE) 50 MCG/ACT nasal spray Administer 2 Sprays into affected nostril(S) every evening.     • zinc gluconate 50 MG Tab tablet Take 50 mg by mouth every day.     • atorvastatin (LIPITOR) 20 MG Tab Take 20 mg by mouth every day.     • metoprolol SR (TOPROL XL) 25 MG TABLET SR 24 HR Take 12.5 mg by mouth every day.     • rivaroxaban (XARELTO) 20 MG Tab tablet Take 20 mg by mouth with dinner.     • progesterone (PROMETRIUM) 100 MG Cap Take 100 mg by mouth every evening.     • montelukast (SINGULAIR) 10 MG Tab TAKE 1 TABLET BY MOUTH IN THE EVENING (Patient taking differently: Take 10 mg by mouth every day.) 90 Tablet 4   • budesonide-formoterol (SYMBICORT) 80-4.5 MCG/ACT Aerosol Inhale 2 Puffs 2 times a day. 3 Each 4   • albuterol (PROAIR HFA) 108 (90 Base) MCG/ACT Aero Soln inhalation aerosol Inhale 2 Puffs every 6 hours as needed for Shortness of Breath. 25.5 g 4   • Coenzyme Q10 (CO Q-10) 200 MG Cap Take 200 mg by mouth every day.     • Cyanocobalamin (VITAMIN B-12 PO) Take 1 Tablet by mouth every day.     • losartan (COZAAR) 100 MG Tab Take 100 mg by mouth every day.     • Dexlansoprazole (DEXILANT) 60 MG CAPSULE DELAYED RELEASE delayed-release capsule Take 60 mg by mouth every  morning.     • famotidine (PEPCID) 20 MG Tab Take 20 mg by mouth 1 time a day as needed.     • Magnesium 400 MG Cap Take 1 capsule by mouth every day.     • Cholecalciferol (VITAMIN D3 PO) Take 5,000 Units by mouth every day.       No current facility-administered medications for this visit.         Physical Exam:   Gen:           Alert and oriented, No apparent distress. Mood and affect appropriate, normal interaction with examiner.  Eyes:          PERRL, EOM intact, sclere white, conjunctive moist.  Ears:          Not examined. No lesions or deformities.  Hearing:     Grossly intact.  Nose:          Normal, no lesions or deformities.  Dentition:    Good dentition.  Oropharynx:   Tongue normal, posterior pharynx without erythema or exudate.  Neck:        Supple, trachea midline, no masses.  Respiratory Effort: No intercostal retractions or use of accessory muscles.   Lung Auscultation:      Clear to auscultation bilaterally; no rales, rhonchi or wheezing.  CV:            Regular rate and rhythm. No  rubs or gallops, grade 2 systolic murmur noted in aortic region.  Abd:           Not examined. Soft non tender, non distended. Normal active bowel sounds. No masses.  Lymphadenopathy: No palpable nodes or edema.  Gait and Station: Normal.  Digits and Nails: No clubbing, cyanosis, petechiae, or nodes.   Cranial Nerves: II-XII grossly intact.  Skin:        No rashes, lesions or ulcers noted.               Ext:           No cyanosis or edema.      Assessment:  1. Moderate persistent asthma without complication         Plan:  1.  Stable.  No new asthmatic symptoms at this time.  Continue using Symbicort and Singulair daily.  Albuterol as needed.  Follow-up in 6 months or sooner if needed.  Advised to reach out via phone or MyChart with any questions or concerns before next appointment.  If there is poor air quality due to smoke from forest fires, may reach out for refill on emergency pack if needed.    Please note that this  dictation was created using voice recognition software. I have made every reasonable attempt to correct obvious errors, but it is possible there are errors of grammar and possibly content that I did not discover before finalizing the note.

## 2022-05-13 ENCOUNTER — HOSPITAL ENCOUNTER (OUTPATIENT)
Dept: RADIOLOGY | Facility: MEDICAL CENTER | Age: 67
End: 2022-05-13
Attending: FAMILY MEDICINE
Payer: COMMERCIAL

## 2022-05-13 DIAGNOSIS — Z12.31 VISIT FOR SCREENING MAMMOGRAM: ICD-10-CM

## 2022-05-13 PROCEDURE — 77063 BREAST TOMOSYNTHESIS BI: CPT

## 2022-06-15 PROBLEM — S83.232A COMPLEX TEAR OF MEDIAL MENISCUS OF LEFT KNEE AS CURRENT INJURY: Status: ACTIVE | Noted: 2022-06-15

## 2022-07-01 PROBLEM — S83.231A COMPLEX TEAR OF MEDIAL MENISCUS OF RIGHT KNEE: Status: ACTIVE | Noted: 2022-06-15

## 2022-08-02 PROBLEM — M17.11 ARTHRITIS OF RIGHT KNEE: Status: ACTIVE | Noted: 2022-08-02

## 2022-11-02 ENCOUNTER — OFFICE VISIT (OUTPATIENT)
Dept: SLEEP MEDICINE | Facility: MEDICAL CENTER | Age: 67
End: 2022-11-02
Payer: COMMERCIAL

## 2022-11-02 VITALS
HEART RATE: 65 BPM | SYSTOLIC BLOOD PRESSURE: 136 MMHG | DIASTOLIC BLOOD PRESSURE: 80 MMHG | RESPIRATION RATE: 16 BRPM | OXYGEN SATURATION: 94 % | BODY MASS INDEX: 41.46 KG/M2 | WEIGHT: 273.6 LBS | HEIGHT: 68 IN

## 2022-11-02 DIAGNOSIS — J45.40 MODERATE PERSISTENT ASTHMA WITHOUT COMPLICATION: ICD-10-CM

## 2022-11-02 PROCEDURE — 99213 OFFICE O/P EST LOW 20 MIN: CPT | Performed by: NURSE PRACTITIONER

## 2022-11-02 RX ORDER — BENZONATATE 100 MG/1
100 CAPSULE ORAL 3 TIMES DAILY PRN
Qty: 60 CAPSULE | Refills: 0 | Status: SHIPPED | OUTPATIENT
Start: 2022-11-02 | End: 2023-08-21

## 2022-11-02 RX ORDER — BUDESONIDE AND FORMOTEROL FUMARATE DIHYDRATE 160; 4.5 UG/1; UG/1
2 AEROSOL RESPIRATORY (INHALATION) 2 TIMES DAILY
Qty: 2 EACH | Refills: 2 | Status: SHIPPED | OUTPATIENT
Start: 2022-11-02 | End: 2023-03-07

## 2022-11-02 ASSESSMENT — FIBROSIS 4 INDEX: FIB4 SCORE: 0.93

## 2022-11-02 NOTE — PROGRESS NOTES
Chief Complaint   Patient presents with    Asthma       HPI:  Sara Knight is a 66 y.o. year old female here today for follow-up on asthma.  Last seen by me on 5/2/2022.  Past medical history includes GERD, A. fib on anticoagulation, osteoarthritis, and asthma.  She is a lifelong non-smoker. Typical triggers for her asthma include hairspray, perfume, pollen, acid reflux, and wildfire smoke.  She reports symptoms being well controlled today on Symbicort 80, montelukast and proair.     Patient states that she was recently diagnosed with a upper respiratory infection.  She completed the prednisone and Z-Silverio as of yesterday.  She initially came down with symptoms of allergies such as nasal and head congestion with rhinorrhea.  This progressed to sore throat and fever.  As the days went on her SPO2 dropped to 85% with activity.  She took a home COVID test which was negative and then went to the urgent care as she thought she had strep throat.  Strep test was negative as well as a molecular COVID test negative.  They did not perform a flu test, but she was vaccinated in October.  She is taking Symbicort 80 mcg twice daily and using her ProAir approximately 4 times daily.  She is also using her nebulizer before bedtime.  Current symptoms include a dry cough with chest congestion.  She is currently also taking Benadryl.  She does experience wheezing and chest tightness.    PFT (8/5/2021):  FEV1 is 1.88 L which is 70% of predicted.  FEV1 FVC ratio is 70%. The study is compatible with mild obstructive lung disease.  A restrictive process cannot be ruled out without measurement of lung volumes.    ROS: As per HPI and otherwise negative if not stated.    Past Medical History:   Diagnosis Date    Anesthesia     PONV    Arrhythmia     Afib    Arthritis     osteo    Asthma     daily inhalor    Gavin esophagus     Diabetes (HCC)     Pre-Diabetes    GERD (gastroesophageal reflux disease)     Heart burn     High cholesterol      Hypertension     Indigestion     Osteoarthritis     PONV (postoperative nausea and vomiting)     with codiene and opiates       Past Surgical History:   Procedure Laterality Date    PB KNEE SCOPE,MED/LAT MENISECTOMY Right 8/2/2022    Procedure: RIGH KNEE ARTHROSCOPY MEDIAL MENISCECTOMY, REPAIRS AS INDICATED;  Surgeon: Jarrell Harris M.D.;  Location: [facility];  Service: Orthopedics    LAP, REMOVE ADJUST NATIVIDAD RESTRICT D* N/A 4/21/2022    Procedure: REMOVAL, GASTRIC BAND AND PORT, LAPAROSCOPIC;  Surgeon: Driss Louis M.D.;  Location: SURGERY [facility];  Service: General    [place] UPPER GI ENDOSCOPY,DIAGNOSIS  6/3/2021    Procedure: GASTROSCOPY;  Surgeon: Jaime Starr M.D.;  Location: SURGERY [facility];  Service: EUS    EGD W/ENDOSCOPIC ULTRASOUND  6/3/2021    Procedure: EGD, WITH ENDOSCOPIC US;  Surgeon: Jaime Starr M.D.;  Location: SURGERY [facility];  Service: EUS    EGD WITH ASP/BX  6/3/2021    Procedure: EGD, WITH ASPIRATION BIOPSY;  Surgeon: Jaime Starr M.D.;  Location: SURGERY [facility];  Service: EUS    SHOULDER ARTHROSCOPY Left 2019    SHOULDER ARTHROSCOPY Right 2018    CHOLECYSTECTOMY  2017    GASTRIC BANDING LAPAROSCOPIC  2008    OTHER ORTHOPEDIC SURGERY Right 1998    tarsal fusion     OTHER ORTHOPEDIC SURGERY Right     foot surgery    TONSILLECTOMY      as a child       History reviewed. No pertinent family history.    Allergies as of 11/02/2022 - Reviewed 11/02/2022   Allergen Reaction Noted    Soap Shortness of Breath 04/21/2022    Hydrocodone  03/02/2021    Codeine Itching 04/18/2022    Hydrocodone-acetaminophen  09/03/2022    Oxycodone Itching and Nausea 01/22/2019        Vitals:  There were no vitals taken for this visit.    Current medications as of today   Current Outpatient Medications   Medication Sig Dispense Refill    tamsulosin (FLOMAX) 0.4 MG capsule Take 0.4 mg by mouth 1/2 hour after breakfast.      ondansetron (ZOFRAN ODT) 4 MG TABLET DISPERSIBLE Take 1  Tablet by mouth every 6 hours as needed for Nausea. 10 Tablet 0    omeprazole (PRILOSEC) 20 MG delayed-release capsule Take 20 mg by mouth every day.      mupirocin (BACTROBAN) 2 % Ointment mupirocin 2 % topical ointment   APPLY TO AFFECTED AREA(S) 4 TIMES A DAY AS DIRECTED      NON SPECIFIED Apply 1 Each topically every day. Estradiol Cream to forearm compound from PA      fluticasone (FLONASE) 50 MCG/ACT nasal spray Administer 2 Sprays into affected nostril(S) every evening.      zinc gluconate 50 MG Tab tablet Take 50 mg by mouth every day.      atorvastatin (LIPITOR) 20 MG Tab Take 20 mg by mouth every day.      metoprolol SR (TOPROL XL) 25 MG TABLET SR 24 HR Take 12.5 mg by mouth every day.      rivaroxaban (XARELTO) 20 MG Tab tablet Take 20 mg by mouth with dinner.      progesterone (PROMETRIUM) 100 MG Cap Take 100 mg by mouth every evening.      montelukast (SINGULAIR) 10 MG Tab TAKE 1 TABLET BY MOUTH IN THE EVENING (Patient taking differently: Take 10 mg by mouth every day.) 90 Tablet 4    budesonide-formoterol (SYMBICORT) 80-4.5 MCG/ACT Aerosol Inhale 2 Puffs 2 times a day. 3 Each 4    albuterol (PROAIR HFA) 108 (90 Base) MCG/ACT Aero Soln inhalation aerosol Inhale 2 Puffs every 6 hours as needed for Shortness of Breath. 25.5 g 4    Coenzyme Q10 (CO Q-10) 200 MG Cap Take 200 mg by mouth every day.      Cyanocobalamin (VITAMIN B-12 PO) Take 1 Tablet by mouth every day.      losartan (COZAAR) 100 MG Tab Take 100 mg by mouth every day.      Magnesium 400 MG Cap Take 1 capsule by mouth every day.      Cholecalciferol (VITAMIN D3 PO) Take 5,000 Units by mouth every day.       No current facility-administered medications for this visit.         Physical Exam:   Gen:           Alert and oriented, No apparent distress. Mood and affect appropriate, normal interaction with examiner.  Eyes:          PERRL, EOM intact, sclere white, conjunctive moist.  Ears:          Not examined.   Hearing:     Grossly intact.  Nose:           Normal, no lesions or deformities.  Dentition:    Good dentition.  Oropharynx:   Tongue normal, posterior pharynx without erythema or exudate  Neck:        Supple, trachea midline, no masses.  Respiratory Effort: No intercostal retractions or use of accessory muscles.   Lung Auscultation:      Clear to auscultation bilaterally; no rales, rhonchi or wheezing.  CV:            Regular rate and rhythm. No murmurs, rubs or gallops.  Abd:           Not examined.   Lymphadenopathy: Not examined.  Gait and Station: Normal.  Digits and Nails: No clubbing, cyanosis, petechiae, or nodes.   Cranial Nerves: II-XII grossly intact.  Skin:        No rashes, lesions or ulcers noted.               Ext:           No cyanosis or edema.      Assessment:  1. Moderate persistent asthma without complication            Plan:  Patient recently completed antibiotics and prednisone for an upper respiratory infection, which is increasing her asthma symptoms.  Patient does have evidence of croupy cough that is dry and BBS CTA.  At this time I am increasing her dose of Symbicort to 160.  Advised her to take for at least 2 weeks.  Also recommended increasing her nebulizer use for increase in symptoms.  Prescription for Tessalon also provided today.  Requested patient message me in approximately 2 weeks to let me know how she is doing.  May consider imaging if symptoms are lingering.  Otherwise patient will follow-up in approximately 6 months.     Please note that this dictation was created using voice recognition software. I have made every reasonable attempt to correct obvious errors, but it is possible there are errors of grammar and possibly content that I did not discover before finalizing the note.

## 2022-11-08 ENCOUNTER — HOSPITAL ENCOUNTER (OUTPATIENT)
Dept: RADIOLOGY | Facility: MEDICAL CENTER | Age: 67
End: 2022-11-08
Attending: PHYSICIAN ASSISTANT
Payer: COMMERCIAL

## 2022-11-08 DIAGNOSIS — N20.0 CALCULUS OF KIDNEY: ICD-10-CM

## 2022-11-08 PROCEDURE — 74018 RADEX ABDOMEN 1 VIEW: CPT

## 2022-11-08 PROCEDURE — 76775 US EXAM ABDO BACK WALL LIM: CPT

## 2022-11-09 ENCOUNTER — PATIENT MESSAGE (OUTPATIENT)
Dept: HEALTH INFORMATION MANAGEMENT | Facility: OTHER | Age: 67
End: 2022-11-09

## 2022-11-09 DIAGNOSIS — J30.9 ALLERGIC RHINITIS: ICD-10-CM

## 2022-11-09 RX ORDER — MONTELUKAST SODIUM 10 MG/1
TABLET ORAL
Qty: 90 TABLET | Refills: 3 | Status: SHIPPED | OUTPATIENT
Start: 2022-11-09 | End: 2023-10-06 | Stop reason: SDUPTHER

## 2022-11-09 NOTE — TELEPHONE ENCOUNTER
Have we ever prescribed this med? Yes.  If yes, what date? 4/21/2022    Last OV: 11/2/2022    Next OV: [id number]    DX: Asthma    Medications: Montelukast

## 2023-02-17 ENCOUNTER — HOSPITAL ENCOUNTER (OUTPATIENT)
Dept: RADIOLOGY | Facility: MEDICAL CENTER | Age: 68
End: 2023-02-17
Attending: FAMILY MEDICINE
Payer: COMMERCIAL

## 2023-02-17 DIAGNOSIS — R79.89 ELEVATED PARATHYROID HORMONE: ICD-10-CM

## 2023-02-17 PROCEDURE — 76536 US EXAM OF HEAD AND NECK: CPT

## 2023-03-06 ENCOUNTER — APPOINTMENT (OUTPATIENT)
Dept: ADMISSIONS | Facility: MEDICAL CENTER | Age: 68
End: 2023-03-06
Attending: ORTHOPAEDIC SURGERY
Payer: COMMERCIAL

## 2023-03-07 ENCOUNTER — PRE-ADMISSION TESTING (OUTPATIENT)
Dept: ADMISSIONS | Facility: MEDICAL CENTER | Age: 68
End: 2023-03-07
Attending: ORTHOPAEDIC SURGERY
Payer: COMMERCIAL

## 2023-03-07 VITALS — BODY MASS INDEX: 40.4 KG/M2 | HEIGHT: 69 IN

## 2023-03-07 RX ORDER — SEMAGLUTIDE 0.25 MG/.5ML
0.25 INJECTION, SOLUTION SUBCUTANEOUS
COMMUNITY
End: 2023-09-20

## 2023-03-07 RX ORDER — SENNOSIDES 8.6 MG
1300 CAPSULE ORAL DAILY
COMMUNITY

## 2023-03-07 NOTE — PREPROCEDURE INSTRUCTIONS
Pre-admit appointment completed.  Pt instructed to continue regularly prescribed medications through the day before surgery. Pt instructed to take the following medications the day of surgery with a sip of water, per anesthesia protocol;  Tylenol, lipitor, omeprazole, and if needed-albuterol, symbicort, ultram.    Pt states she has cardiology appt on 3/27/23 and she will ask office to fax over EKG tracing and cardiology clearance to [phone].    Case message sent to Dr Harris's MA for PCP or  Cardiac clearance requirement (per pre- procedural clearance protocol) due to HX of A-fib, BMI=41.35, HTN, mild aortic stenosis, asthma.

## 2023-03-10 ENCOUNTER — APPOINTMENT (OUTPATIENT)
Dept: ADMISSIONS | Facility: MEDICAL CENTER | Age: 68
End: 2023-03-10
Attending: ORTHOPAEDIC SURGERY
Payer: COMMERCIAL

## 2023-03-10 DIAGNOSIS — Z01.812 PRE-OPERATIVE LABORATORY EXAMINATION: ICD-10-CM

## 2023-03-10 DIAGNOSIS — M17.11 PRIMARY OSTEOARTHRITIS OF RIGHT KNEE: ICD-10-CM

## 2023-03-10 LAB
ANION GAP SERPL CALC-SCNC: 9 MMOL/L (ref 7–16)
BUN SERPL-MCNC: 15 MG/DL (ref 8–22)
CALCIUM SERPL-MCNC: 9 MG/DL (ref 8.4–10.2)
CHLORIDE SERPL-SCNC: 105 MMOL/L (ref 96–112)
CO2 SERPL-SCNC: 27 MMOL/L (ref 20–33)
CREAT SERPL-MCNC: 0.79 MG/DL (ref 0.5–1.4)
ERYTHROCYTE [DISTWIDTH] IN BLOOD BY AUTOMATED COUNT: 43.8 FL (ref 35.9–50)
GFR SERPLBLD CREATININE-BSD FMLA CKD-EPI: 82 ML/MIN/1.73 M 2
GLUCOSE SERPL-MCNC: 97 MG/DL (ref 65–99)
HCT VFR BLD AUTO: 41.5 % (ref 37–47)
HGB BLD-MCNC: 12.7 G/DL (ref 12–16)
MCH RBC QN AUTO: 25.1 PG (ref 27–33)
MCHC RBC AUTO-ENTMCNC: 30.6 G/DL (ref 33.6–35)
MCV RBC AUTO: 82.2 FL (ref 81.4–97.8)
PLATELET # BLD AUTO: 293 K/UL (ref 164–446)
PMV BLD AUTO: 9.8 FL (ref 9–12.9)
POTASSIUM SERPL-SCNC: 4.3 MMOL/L (ref 3.6–5.5)
RBC # BLD AUTO: 5.05 M/UL (ref 4.2–5.4)
SODIUM SERPL-SCNC: 141 MMOL/L (ref 135–145)
WBC # BLD AUTO: 7.3 K/UL (ref 4.8–10.8)

## 2023-03-10 PROCEDURE — 80048 BASIC METABOLIC PNL TOTAL CA: CPT

## 2023-03-10 PROCEDURE — 36415 COLL VENOUS BLD VENIPUNCTURE: CPT

## 2023-03-10 PROCEDURE — 87641 MR-STAPH DNA AMP PROBE: CPT

## 2023-03-10 PROCEDURE — 85027 COMPLETE CBC AUTOMATED: CPT

## 2023-03-10 PROCEDURE — 87640 STAPH A DNA AMP PROBE: CPT

## 2023-03-11 LAB
SCCMEC + MECA PNL NOSE NAA+PROBE: NEGATIVE
SCCMEC + MECA PNL NOSE NAA+PROBE: NEGATIVE

## 2023-05-02 ENCOUNTER — OFFICE VISIT (OUTPATIENT)
Dept: SLEEP MEDICINE | Facility: MEDICAL CENTER | Age: 68
End: 2023-05-02
Attending: NURSE PRACTITIONER
Payer: COMMERCIAL

## 2023-05-02 VITALS
RESPIRATION RATE: 16 BRPM | OXYGEN SATURATION: 94 % | HEIGHT: 69 IN | WEIGHT: 276 LBS | SYSTOLIC BLOOD PRESSURE: 120 MMHG | HEART RATE: 62 BPM | BODY MASS INDEX: 40.88 KG/M2 | DIASTOLIC BLOOD PRESSURE: 62 MMHG

## 2023-05-02 DIAGNOSIS — J30.2 SEASONAL ALLERGIC RHINITIS, UNSPECIFIED TRIGGER: ICD-10-CM

## 2023-05-02 DIAGNOSIS — J45.40 MODERATE PERSISTENT ASTHMA WITHOUT COMPLICATION: ICD-10-CM

## 2023-05-02 PROCEDURE — 99212 OFFICE O/P EST SF 10 MIN: CPT | Performed by: NURSE PRACTITIONER

## 2023-05-02 PROCEDURE — 99213 OFFICE O/P EST LOW 20 MIN: CPT | Performed by: NURSE PRACTITIONER

## 2023-05-02 RX ORDER — BUDESONIDE AND FORMOTEROL FUMARATE DIHYDRATE 160; 4.5 UG/1; UG/1
AEROSOL RESPIRATORY (INHALATION)
Qty: 1 EACH | Refills: 5 | Status: SHIPPED | OUTPATIENT
Start: 2023-05-02 | End: 2024-03-05 | Stop reason: SDUPTHER

## 2023-05-02 ASSESSMENT — FIBROSIS 4 INDEX: FIB4 SCORE: 1.42

## 2023-05-02 ASSESSMENT — PATIENT HEALTH QUESTIONNAIRE - PHQ9: CLINICAL INTERPRETATION OF PHQ2 SCORE: 0

## 2023-05-02 NOTE — PROGRESS NOTES
Chief Complaint   Patient presents with    Asthma       HPI:  Sara Knight is a 67 y.o. year old female here today for follow-up on asthma.  Last seen by me on 11/2/2022.  Past medical history includes GERD, A. fib on anticoagulation, osteoarthritis, and asthma.  She is a lifelong non-smoker. Typical triggers for her asthma include hairspray, perfume, pollen, acid reflux, and wildfire smoke.  She reports symptoms being well controlled today on Symbicort 80, montelukast and proair.     Since last visit, patient contracted influenza A, pneumonia and bronchitis over a 4-month span.  She was initially supposed to have a right knee replacement, but surgery got postponed.  At last visit, I recommended increasing Symbicort from 80 mcg to 160 mcg when evidence of worsening symptoms occurred.  Patient states this is helped tremendously.  She is currently taking Singulair and Zyrtec for allergy symptoms and also Symbicort 80, montelukast, and ProAir as needed.  She denies any current symptoms including cough, wheezing, chest tightness, shortness of breath, or new or worsening dyspnea.  She denies any hospitalizations since last visit but does endorse those episodes of flu, bronchitis, and pneumonia.    ROS: As per HPI and otherwise negative if not stated.    Past Medical History:   Diagnosis Date    Adverse effect of anesthesia 2017    Bradycardiac with gallbladder surgery    Anesthesia     PONV    Aortic valve sclerosis     MILD-without arotic stenosis per echo report 7/6/22    Arrhythmia     Afib- Follows with Dr FABI Navarro @ [facility]    Arthritis     osteo    Asthma     daily inhalor    Gavin esophagus     taking omeprazole    Diabetes (HCC)     Pre-Diabetes-does not check glucose at home    GERD (gastroesophageal reflux disease)     Heart burn     taking omeprazole    High cholesterol     Hypertension     Indigestion     taking omeprazole    Osteoarthritis     PONV (postoperative nausea and vomiting)  "    with codiene and opiates    Right knee pain 03/07/2023    Seasonal allergies        Past Surgical History:   Procedure Laterality Date    PB KNEE SCOPE,MED/LAT MENISECTOMY Right 08/02/2022    Procedure: RIGH KNEE ARTHROSCOPY MEDIAL MENISCECTOMY, REPAIRS AS INDICATED;  Surgeon: Jarrell Harris M.D.;  Location: [facility];  Service: Orthopedics    LAP, REMOVE ADJUST NATIVIDAD RESTRICT D* N/A 04/21/2022    Procedure: REMOVAL, GASTRIC BAND AND PORT, LAPAROSCOPIC;  Surgeon: Driss Louis M.D.;  Location: SURGERY [facility];  Service: General    [place] UPPER GI ENDOSCOPY,DIAGNOSIS  06/03/2021    Procedure: GASTROSCOPY;  Surgeon: Jaime Starr M.D.;  Location: SURGERY [facility];  Service: EUS    EGD W/ENDOSCOPIC ULTRASOUND  06/03/2021    Procedure: EGD, WITH ENDOSCOPIC US;  Surgeon: Jaime Starr M.D.;  Location: SURGERY [facility];  Service: EUS    EGD WITH ASP/BX  06/03/2021    Procedure: EGD, WITH ASPIRATION BIOPSY;  Surgeon: Jaime Starr M.D.;  Location: SURGERY [facility];  Service: EUS    SHOULDER ARTHROSCOPY Left 2019    SHOULDER ARTHROSCOPY Right 2018    CHOLECYSTECTOMY  2017    GASTRIC BANDING LAPAROSCOPIC  2008    OTHER ORTHOPEDIC SURGERY Right 1998    tarsal fusion     OTHER ORTHOPEDIC SURGERY Right     foot surgery    TONSILLECTOMY      as a child       Family History   Problem Relation Age of Onset    Lung Disease Mother     Hypertension Mother     Lung Disease Father     Cancer Father     Hypertension Father        Allergies as of 05/02/2023 - Reviewed 05/02/2023   Allergen Reaction Noted    Soap Shortness of Breath 04/21/2022    Codeine Itching 04/18/2022    Hydrocodone  03/02/2021    Oxycodone Itching and Nausea 01/22/2019        Vitals:  /62 (BP Location: Left arm, Patient Position: Sitting, BP Cuff Size: Large adult)   Pulse 62   Resp 16   Ht 1.753 m (5' 9\")   Wt (!) 125 kg (276 lb)   SpO2 94%     Current medications as of today   Current Outpatient Medications "   Medication Sig Dispense Refill    budesonide-formoterol (SYMBICORT) 160-4.5 MCG/ACT Aerosol Symbicort 160 mcg-4.5 mcg/actuation HFA aerosol inhaler 1 Each 5    Semaglutide (WEGOVY) 0.25 MG/0.5ML Solution Auto-injector Pen-injector Inject 0.25 mg under the skin every 7 days. Indications: OBESITY      acetaminophen (TYLENOL) 650 MG CR tablet Take 1,300 mg by mouth every day.      traZODone (DESYREL) 100 MG Tab Take  mg by mouth at bedtime as needed.      famotidine (PEPCID) 20 MG Tab Take 20 mg by mouth every evening.      traMADol (ULTRAM) 50 MG Tab Take 50 mg by mouth every 6 hours as needed.      montelukast (SINGULAIR) 10 MG Tab TAKE 1 TABLET BY MOUTH IN THE EVENING 90 Tablet 3    omeprazole (PRILOSEC) 20 MG delayed-release capsule Take 20 mg by mouth every morning.      fluticasone (FLONASE) 50 MCG/ACT nasal spray Administer 2 Sprays into affected nostril(S) every evening.      zinc gluconate 50 MG Tab tablet Take 50 mg by mouth. TWICE PER WEEK      atorvastatin (LIPITOR) 20 MG Tab Take 20 mg by mouth every day.      metoprolol SR (TOPROL XL) 25 MG TABLET SR 24 HR Take 12.5 mg by mouth every evening.      rivaroxaban (XARELTO) 20 MG Tab tablet Take 20 mg by mouth with dinner.      budesonide-formoterol (SYMBICORT) 80-4.5 MCG/ACT Aerosol Inhale 2 Puffs 2 times a day. (Patient taking differently: Inhale 2 Puffs 2 times a day as needed.) 3 Each 4    albuterol (PROAIR HFA) 108 (90 Base) MCG/ACT Aero Soln inhalation aerosol Inhale 2 Puffs every 6 hours as needed for Shortness of Breath. 25.5 g 4    Coenzyme Q10 (CO Q-10) 200 MG Cap Take 200 mg by mouth every day.      Cyanocobalamin (VITAMIN B-12 PO) Take 1 Tablet by mouth every day.      losartan (COZAAR) 100 MG Tab Take 100 mg by mouth every day.      Magnesium 400 MG Cap Take 1 capsule by mouth every day.      Cholecalciferol (VITAMIN D3 PO) Take 5,000 Units by mouth every day.      benzonatate (TESSALON) 100 MG Cap Take 1 Capsule by mouth 3 times a day as  needed for Cough. Do not chew. Swallow whole. 60 Capsule 0     No current facility-administered medications for this visit.         Physical Exam:   Gen:           Alert and oriented, No apparent distress. Mood and affect appropriate, normal interaction with examiner.  Eyes:          PERRL, EOM intact, sclere white, conjunctive moist.  Ears:          Not examined.   Hearing:     Grossly intact.  Nose:          Normal, no lesions or deformities.  Dentition:    Good dentition.  Oropharynx:   Tongue normal, posterior pharynx without erythema or exudat  Neck:        Supple, trachea midline, no masses.  Respiratory Effort: No intercostal retractions or use of accessory muscles.   Lung Auscultation:      Clear to auscultation bilaterally; no rales, rhonchi or wheezing.  CV:            Regular rate and rhythm. No murmurs, rubs or gallops.  Abd:           Not examined.   Lymphadenopathy: Not examined.  Gait and Station: Normal.  Digits and Nails: No clubbing, cyanosis, petechiae, or nodes.   Cranial Nerves: II-XII grossly intact.  Skin:        No rashes, lesions or ulcers noted.               Ext:           No cyanosis or edema.      Assessment:  1. Moderate persistent asthma without complication  budesonide-formoterol (SYMBICORT) 160-4.5 MCG/ACT Aerosol      2. Seasonal allergic rhinitis, unspecified trigger            Plan:  Continue current regimen of Singulair, Zyrtec, Symbicort 80 which will increase to 160 with increase in symptoms, and ProAir.  Patient is currently symptom-free.  She denies any exacerbations since last visit.  Main symptoms currently are allergy symptoms.  Patient advised to follow-up in 6 months, but can be seen sooner if needed.    Please note that this dictation was created using voice recognition software. I have made every reasonable attempt to correct obvious errors, but it is possible there are errors of grammar and possibly content that I did not discover before finalizing the note.

## 2023-05-30 ENCOUNTER — HOSPITAL ENCOUNTER (OUTPATIENT)
Dept: RADIOLOGY | Facility: MEDICAL CENTER | Age: 68
End: 2023-05-30
Attending: FAMILY MEDICINE
Payer: COMMERCIAL

## 2023-05-30 DIAGNOSIS — Z12.31 VISIT FOR SCREENING MAMMOGRAM: ICD-10-CM

## 2023-05-30 PROCEDURE — 77063 BREAST TOMOSYNTHESIS BI: CPT

## 2023-06-06 ENCOUNTER — APPOINTMENT (RX ONLY)
Dept: URBAN - METROPOLITAN AREA CLINIC 6 | Facility: CLINIC | Age: 68
Setting detail: DERMATOLOGY
End: 2023-06-06

## 2023-06-06 DIAGNOSIS — Z85.828 PERSONAL HISTORY OF OTHER MALIGNANT NEOPLASM OF SKIN: ICD-10-CM

## 2023-06-06 DIAGNOSIS — Z71.89 OTHER SPECIFIED COUNSELING: ICD-10-CM

## 2023-06-06 DIAGNOSIS — L91.8 OTHER HYPERTROPHIC DISORDERS OF THE SKIN: ICD-10-CM

## 2023-06-06 DIAGNOSIS — D22 MELANOCYTIC NEVI: ICD-10-CM

## 2023-06-06 DIAGNOSIS — L82.1 OTHER SEBORRHEIC KERATOSIS: ICD-10-CM

## 2023-06-06 DIAGNOSIS — D18.0 HEMANGIOMA: ICD-10-CM

## 2023-06-06 DIAGNOSIS — L81.4 OTHER MELANIN HYPERPIGMENTATION: ICD-10-CM

## 2023-06-06 DIAGNOSIS — L73.8 OTHER SPECIFIED FOLLICULAR DISORDERS: ICD-10-CM

## 2023-06-06 PROBLEM — D22.5 MELANOCYTIC NEVI OF TRUNK: Status: ACTIVE | Noted: 2023-06-06

## 2023-06-06 PROBLEM — D22.71 MELANOCYTIC NEVI OF RIGHT LOWER LIMB, INCLUDING HIP: Status: ACTIVE | Noted: 2023-06-06

## 2023-06-06 PROBLEM — D22.61 MELANOCYTIC NEVI OF RIGHT UPPER LIMB, INCLUDING SHOULDER: Status: ACTIVE | Noted: 2023-06-06

## 2023-06-06 PROBLEM — D22.62 MELANOCYTIC NEVI OF LEFT UPPER LIMB, INCLUDING SHOULDER: Status: ACTIVE | Noted: 2023-06-06

## 2023-06-06 PROBLEM — D18.01 HEMANGIOMA OF SKIN AND SUBCUTANEOUS TISSUE: Status: ACTIVE | Noted: 2023-06-06

## 2023-06-06 PROBLEM — D22.72 MELANOCYTIC NEVI OF LEFT LOWER LIMB, INCLUDING HIP: Status: ACTIVE | Noted: 2023-06-06

## 2023-06-06 PROCEDURE — 99203 OFFICE O/P NEW LOW 30 MIN: CPT

## 2023-06-06 PROCEDURE — ? BENIGN DESTRUCTION COSMETIC

## 2023-06-06 PROCEDURE — ? COUNSELING

## 2023-06-06 ASSESSMENT — LOCATION DETAILED DESCRIPTION DERM
LOCATION DETAILED: RIGHT PROXIMAL CALF
LOCATION DETAILED: RIGHT ANTERIOR DISTAL UPPER ARM
LOCATION DETAILED: NASAL DORSUM
LOCATION DETAILED: LEFT CENTRAL MALAR CHEEK
LOCATION DETAILED: LEFT PROXIMAL DORSAL FOREARM
LOCATION DETAILED: LEFT ANTERIOR DISTAL UPPER ARM
LOCATION DETAILED: LEFT ANTERIOR PROXIMAL THIGH
LOCATION DETAILED: RIGHT MID-UPPER BACK
LOCATION DETAILED: RIGHT VENTRAL PROXIMAL FOREARM
LOCATION DETAILED: LEFT VENTRAL DISTAL FOREARM
LOCATION DETAILED: RIGHT SUPERIOR LATERAL MIDBACK
LOCATION DETAILED: RIGHT DISTAL CALF
LOCATION DETAILED: RIGHT VENTRAL DISTAL FOREARM
LOCATION DETAILED: LEFT INFERIOR UPPER BACK
LOCATION DETAILED: RIGHT ANTERIOR PROXIMAL THIGH
LOCATION DETAILED: LEFT PROXIMAL CALF
LOCATION DETAILED: LEFT INFERIOR LATERAL NECK
LOCATION DETAILED: LEFT DISTAL POSTERIOR THIGH
LOCATION DETAILED: RIGHT PROXIMAL DORSAL FOREARM
LOCATION DETAILED: RIGHT DISTAL POSTERIOR THIGH
LOCATION DETAILED: RIGHT MEDIAL INFERIOR CHEST

## 2023-06-06 ASSESSMENT — LOCATION ZONE DERM
LOCATION ZONE: FACE
LOCATION ZONE: LEG
LOCATION ZONE: ARM
LOCATION ZONE: TRUNK
LOCATION ZONE: NECK
LOCATION ZONE: NOSE

## 2023-06-06 ASSESSMENT — LOCATION SIMPLE DESCRIPTION DERM
LOCATION SIMPLE: LEFT CALF
LOCATION SIMPLE: LEFT POSTERIOR THIGH
LOCATION SIMPLE: RIGHT LOWER BACK
LOCATION SIMPLE: RIGHT POSTERIOR THIGH
LOCATION SIMPLE: LEFT UPPER BACK
LOCATION SIMPLE: RIGHT THIGH
LOCATION SIMPLE: CHEST
LOCATION SIMPLE: RIGHT CALF
LOCATION SIMPLE: LEFT UPPER ARM
LOCATION SIMPLE: LEFT ANTERIOR NECK
LOCATION SIMPLE: RIGHT UPPER ARM
LOCATION SIMPLE: LEFT THIGH
LOCATION SIMPLE: RIGHT UPPER BACK
LOCATION SIMPLE: LEFT CHEEK
LOCATION SIMPLE: RIGHT FOREARM
LOCATION SIMPLE: LEFT FOREARM
LOCATION SIMPLE: NOSE

## 2023-06-06 NOTE — PROCEDURE: BENIGN DESTRUCTION COSMETIC
Price (Use Numbers Only, No Special Characters Or $): 0
Consent: The patient's consent was obtained including but not limited to risks of crusting, scabbing, blistering, scarring, darker or lighter pigmentary change, recurrence, incomplete removal and infection.
Detail Level: Detailed
Post-Care Instructions: I reviewed with the patient in detail post-care instructions. Patient is to wear sunprotection, and avoid picking at any of the treated lesions. Pt may apply Vaseline to crusted or scabbing areas.
Anesthesia Volume In Cc: 0.5

## 2023-08-21 PROBLEM — M17.12 DEGENERATIVE ARTHRITIS OF LEFT KNEE: Status: ACTIVE | Noted: 2023-08-21

## 2023-08-21 ASSESSMENT — FIBROSIS 4 INDEX: FIB4 SCORE: 1.42

## 2023-08-22 ENCOUNTER — HOSPITAL ENCOUNTER (OUTPATIENT)
Dept: RADIOLOGY | Facility: MEDICAL CENTER | Age: 68
End: 2023-08-22
Attending: INTERNAL MEDICINE
Payer: COMMERCIAL

## 2023-08-22 DIAGNOSIS — G45.9 TIA (TRANSIENT ISCHEMIC ATTACK): ICD-10-CM

## 2023-08-22 PROCEDURE — 93880 EXTRACRANIAL BILAT STUDY: CPT

## 2023-08-30 ENCOUNTER — HOSPITAL ENCOUNTER (OUTPATIENT)
Dept: RADIOLOGY | Facility: MEDICAL CENTER | Age: 68
End: 2023-08-30
Attending: ORTHOPAEDIC SURGERY
Payer: COMMERCIAL

## 2023-08-30 DIAGNOSIS — M17.12 PRIMARY OSTEOARTHRITIS OF LEFT KNEE: ICD-10-CM

## 2023-08-30 PROCEDURE — 73700 CT LOWER EXTREMITY W/O DYE: CPT | Mod: LT

## 2023-09-07 ENCOUNTER — APPOINTMENT (OUTPATIENT)
Dept: ADMISSIONS | Facility: MEDICAL CENTER | Age: 68
End: 2023-09-07
Attending: ORTHOPAEDIC SURGERY
Payer: COMMERCIAL

## 2023-09-20 ENCOUNTER — PRE-ADMISSION TESTING (OUTPATIENT)
Dept: ADMISSIONS | Facility: MEDICAL CENTER | Age: 68
End: 2023-09-20
Attending: ORTHOPAEDIC SURGERY
Payer: COMMERCIAL

## 2023-09-20 VITALS — BODY MASS INDEX: 40.27 KG/M2 | HEIGHT: 69 IN

## 2023-09-20 DIAGNOSIS — Z01.812 PRE-OPERATIVE LABORATORY EXAMINATION: ICD-10-CM

## 2023-09-20 RX ORDER — SEMAGLUTIDE 1.7 MG/.75ML
INJECTION, SOLUTION SUBCUTANEOUS
COMMUNITY
Start: 2023-08-31 | End: 2024-01-15

## 2023-09-20 RX ORDER — AZELASTINE HYDROCHLORIDE 137 UG/1
2 SPRAY, METERED NASAL 2 TIMES DAILY
COMMUNITY
Start: 2023-08-19

## 2023-09-20 NOTE — PREPROCEDURE INSTRUCTIONS
Pre-admit telephone appointment completed. Reviewed the Preparing for your procedure handout with patient over the phone.  Patient instructed per pharmacy guidelines regarding taking or holding regularly prescribed medications before surgery. Instructed to take the following medications the day of surgery with a sip of water per pharmacy medication guidelines: Tylenol, astelin, symbicort, prilosec, and if needed-albuterol.    Pt states Dr Harris instructed her to hold Wygovy 2 weeks prior to surgery and cardiologist instructed her to hold Xarelto 2 days prior to surgery.

## 2023-09-26 ENCOUNTER — PRE-ADMISSION TESTING (OUTPATIENT)
Dept: ADMISSIONS | Facility: MEDICAL CENTER | Age: 68
End: 2023-09-26
Attending: ORTHOPAEDIC SURGERY
Payer: COMMERCIAL

## 2023-09-26 DIAGNOSIS — Z01.812 PRE-OPERATIVE LABORATORY EXAMINATION: ICD-10-CM

## 2023-09-26 DIAGNOSIS — M17.12 PRIMARY OSTEOARTHRITIS OF LEFT KNEE: ICD-10-CM

## 2023-09-26 LAB
ANION GAP SERPL CALC-SCNC: 12 MMOL/L (ref 7–16)
BUN SERPL-MCNC: 14 MG/DL (ref 8–22)
CALCIUM SERPL-MCNC: 9 MG/DL (ref 8.4–10.2)
CHLORIDE SERPL-SCNC: 107 MMOL/L (ref 96–112)
CO2 SERPL-SCNC: 23 MMOL/L (ref 20–33)
CREAT SERPL-MCNC: 0.68 MG/DL (ref 0.5–1.4)
ERYTHROCYTE [DISTWIDTH] IN BLOOD BY AUTOMATED COUNT: 48 FL (ref 35.9–50)
GFR SERPLBLD CREATININE-BSD FMLA CKD-EPI: 95 ML/MIN/1.73 M 2
GLUCOSE SERPL-MCNC: 94 MG/DL (ref 65–99)
HCT VFR BLD AUTO: 40.2 % (ref 37–47)
HGB BLD-MCNC: 12.1 G/DL (ref 12–16)
MCH RBC QN AUTO: 24.9 PG (ref 27–33)
MCHC RBC AUTO-ENTMCNC: 30.1 G/DL (ref 32.2–35.5)
MCV RBC AUTO: 82.7 FL (ref 81.4–97.8)
PLATELET # BLD AUTO: 298 K/UL (ref 164–446)
PMV BLD AUTO: 10.6 FL (ref 9–12.9)
POTASSIUM SERPL-SCNC: 4.1 MMOL/L (ref 3.6–5.5)
RBC # BLD AUTO: 4.86 M/UL (ref 4.2–5.4)
SCCMEC + MECA PNL NOSE NAA+PROBE: NEGATIVE
SCCMEC + MECA PNL NOSE NAA+PROBE: NEGATIVE
SODIUM SERPL-SCNC: 142 MMOL/L (ref 135–145)
WBC # BLD AUTO: 6.6 K/UL (ref 4.8–10.8)

## 2023-09-26 PROCEDURE — 87640 STAPH A DNA AMP PROBE: CPT

## 2023-09-26 PROCEDURE — 80048 BASIC METABOLIC PNL TOTAL CA: CPT

## 2023-09-26 PROCEDURE — 87641 MR-STAPH DNA AMP PROBE: CPT

## 2023-09-26 PROCEDURE — 36415 COLL VENOUS BLD VENIPUNCTURE: CPT

## 2023-09-26 PROCEDURE — 85027 COMPLETE CBC AUTOMATED: CPT

## 2023-09-26 NOTE — DISCHARGE PLANNING
DISCHARGE PLANNING NOTE - TOTAL JOINT    Procedure: Procedure(s):  LEFT TOTAL KNEE ARTHROPLASTY  Procedure Date: 10/18/2023  Insurance: Payor: [facility] / Plan: [employer]ES / Product Type: *No Product type* /    Equipment currently available at home?  cane, chair for the shower. Pt will need a fww. Pt is borrowing an ice machine.  Steps into the home? 2  Steps within the home? 0  Toilet height? ADA  Type of shower? tub-shower  Home Oxygen? No.  Portable tank?    Oxygen Provider:  Who will be with you during your recovery? spouse  Is Outpatient Physical Therapy set up after surgery? Yes  Did you take the Total Joint Class and where? Yes, received [facility] book.  Planning same day discharge?Yes     This writer met with pt and educated to preop showers, nasal mrsa swab and potential for overnight stay. CHG kit given to pt. Home safety checklist and equipment resource guide sent home with pt. Pt educated to dc criteria. All questions answered and verbalizes understanding of all instructions. No dc needs identified at this time. Anticipate dc to home without barriers.

## 2023-10-06 DIAGNOSIS — J30.9 ALLERGIC RHINITIS: ICD-10-CM

## 2023-10-06 RX ORDER — MONTELUKAST SODIUM 10 MG/1
TABLET ORAL
Qty: 90 TABLET | Refills: 3 | Status: SHIPPED | OUTPATIENT
Start: 2023-10-06 | End: 2023-12-05 | Stop reason: SDUPTHER

## 2023-10-06 NOTE — TELEPHONE ENCOUNTER
Caller Name: Sara Zita Nelson                 Call Back Number: [phone] (home)         Patient approves a detailed voicemail message: N\A    Have we ever prescribed this med? Yes.  If yes, what date? 11/9/22    Last OV: 5/2/23 with Harlan Cedillo     Next OV: 12/5/23 with Harlan Cedillo     DX: Allergic rhinitis     Medications: montelukast (SINGULAIR) 10 MG Tab

## 2023-10-17 NOTE — H&P
Surgery Orthopedic History & Physical Note    Date  10/17/2023    Primary Care Physician  Yesi Negrete M.D.    CC  Pre-Op Diagnosis Codes:     * Degenerative arthritis of left knee [M17.12]    HPI  This is a 67 y.o. female who presented with left knee pain and arthritis. She is coming in for a left total knee replacement.     Past Medical History:   Diagnosis Date    Adverse effect of anesthesia 2017    Bradycardiac with gallbladder surgery    Anesthesia     PONV    Aortic valve sclerosis     MILD-without arotic stenosis per echo report 7/6/22    Arrhythmia     Afib- Follows with Dr FABI Navarro @ [facility]    Arthritis     osteo    Asthma     daily inhalor    Gavin esophagus     taking omeprazole    Diabetes (HCC)     Pre-Diabetes-does not check glucose at home    GERD (gastroesophageal reflux disease)     Heart burn     taking omeprazole    High cholesterol     Hypertension     Indigestion     taking omeprazole    Left knee pain 09/20/2023    Osteoarthritis     PONV (postoperative nausea and vomiting)     with codiene and opiates    Right knee pain 03/07/2023    Seasonal allergies        Past Surgical History:   Procedure Laterality Date    PB TOTAL KNEE ARTHROPLASTY  5/10/2023    Procedure: RIGHT TOTAL KNEE ARTHROPLASTY;  Surgeon: Jarrell Harris M.D.;  Location: [facility];  Service: Orthopedics    PB KNEE SCOPE,MED/LAT MENISECTOMY Right 08/02/2022    Procedure: RIG KNEE ARTHROSCOPY MEDIAL MENISCECTOMY, REPAIRS AS INDICATED;  Surgeon: Jarrell Harris M.D.;  Location: [facility];  Service: Orthopedics    LAP, REMOVE ADJUST NATIVIDAD RESTRICT D* N/A 04/21/2022    Procedure: REMOVAL, GASTRIC BAND AND PORT, LAPAROSCOPIC;  Surgeon: Driss Louis M.D.;  Location: SURGERY [facility];  Service: General    [place] UPPER GI ENDOSCOPY,DIAGNOSIS  06/03/2021    Procedure: GASTROSCOPY;  Surgeon: Jaime Starr M.D.;  Location: SURGERY [facility];  Service: EUS    EGD  W/ENDOSCOPIC ULTRASOUND  06/03/2021    Procedure: EGD, WITH ENDOSCOPIC US;  Surgeon: Jaime Starr M.D.;  Location: SURGERY [facility];  Service: EUS    EGD WITH ASP/BX  06/03/2021    Procedure: EGD, WITH ASPIRATION BIOPSY;  Surgeon: Jaime Starr M.D.;  Location: SURGERY [facility];  Service: EUS    SHOULDER ARTHROSCOPY Left 2019    SHOULDER ARTHROSCOPY Right 2018    CHOLECYSTECTOMY  2017    GASTRIC BANDING LAPAROSCOPIC  2008    OTHER ORTHOPEDIC SURGERY Right 1998    tarsal fusion     OTHER ORTHOPEDIC SURGERY Right     foot surgery    TONSILLECTOMY      as a child       No current facility-administered medications for this encounter.     Current Outpatient Medications   Medication Sig Dispense Refill    montelukast (SINGULAIR) 10 MG Tab TAKE 1 TABLET BY MOUTH IN THE EVENING 90 Tablet 3    gabapentin (NEURONTIN) 100 MG Cap 1 capsule PO BID. May slowly increase to a max of 900mg PO BID 90 Capsule 2    diazePAM (VALIUM) 5 MG Tab Take 1-2 tablets PO 1 hour prior to MRI. Do not drive 2 Tablet 0    Azelastine (ASTELIN) 137 MCG/SPRAY Solution Administer 2 Sprays into affected nostril(S) 2 times a day.      WEGOVY 1.7 MG/0.75ML Solution Auto-injector Pen-injector INJECT 1.7 MG UNDER THE SKIN ONCE WEEKLY      amoxicillin (AMOXIL) 500 MG Cap Take 4 caps 1 hour prior to the procedure. (Patient taking differently: Take 2,000 mg by mouth one time as needed. Take 4 caps 1 hour prior to DENTAL WORK) 4 Capsule 4    Cholecalciferol (VITAMIN D3) 125 MCG (5000 UT) Cap Take 5,000 Units by mouth.      losartan (COZAAR) 50 MG Tab Take 50 mg by mouth 2 times a day.      rivaroxaban (XARELTO) 20 MG Tab tablet 1 tablet with food Orally Once a day      budesonide-formoterol (SYMBICORT) 160-4.5 MCG/ACT Aerosol Symbicort 160 mcg-4.5 mcg/actuation HFA aerosol inhaler (Patient taking differently: Inhale 1 Puff every day. Symbicort 160 mcg-4.5 mcg/actuation HFA aerosol inhaler) 1 Each 5    acetaminophen (TYLENOL) 650 MG CR tablet Take  1,300 mg by mouth every day.      traZODone (DESYREL) 100 MG Tab Take  mg by mouth at bedtime as needed.      famotidine (PEPCID) 20 MG Tab Take 20 mg by mouth every evening.      omeprazole (PRILOSEC) 20 MG delayed-release capsule Take 20 mg by mouth every morning.      zinc gluconate 50 MG Tab tablet Take 50 mg by mouth. TWICE PER WEEK      atorvastatin (LIPITOR) 20 MG Tab Take 20 mg by mouth every day.      metoprolol SR (TOPROL XL) 25 MG TABLET SR 24 HR Take 12.5 mg by mouth every evening.      albuterol (PROAIR HFA) 108 (90 Base) MCG/ACT Aero Soln inhalation aerosol Inhale 2 Puffs every 6 hours as needed for Shortness of Breath. 25.5 g 4    Coenzyme Q10 (CO Q-10) 200 MG Cap Take 200 mg by mouth every day.      Cyanocobalamin (VITAMIN B-12 PO) Take 1 Tablet by mouth every day.      Magnesium 400 MG Cap Take 1 capsule by mouth every day.         Social History     Socioeconomic History    Marital status:      Spouse name: Not on file    Number of children: Not on file    Years of education: Not on file    Highest education level: Not on file   Occupational History    Not on file   Tobacco Use    Smoking status: Never    Smokeless tobacco: Never   Vaping Use    Vaping Use: Never used   Substance and Sexual Activity    Alcohol use: Not Currently    Drug use: Never    Sexual activity: Not on file   Other Topics Concern    Not on file   Social History Narrative    Not on file     Social Determinants of Health     Financial Resource Strain: Not on file   Food Insecurity: Not on file   Transportation Needs: Not on file   Physical Activity: Not on file   Stress: Not on file   Social Connections: Not on file   Intimate Partner Violence: Not on file   Housing Stability: Not on file       Family History   Problem Relation Age of Onset    Lung Disease Mother     Hypertension Mother     Lung Disease Father     Cancer Father     Hypertension Father        Allergies  Soap, Clindamycin, Codeine, Hydrocodone, and  Oxycodone    Review of Systems  Negative    Physical Exam  HENT:      Head: Normocephalic and atraumatic.      Nose: Nose normal.   Eyes:      Extraocular Movements: Extraocular movements intact.   Cardiovascular:      Rate and Rhythm: Normal rate.   Pulmonary:      Effort: Pulmonary effort is normal.   Musculoskeletal:      Cervical back: Neck supple.      Left knee: Decreased range of motion. Tenderness present over the medial joint line and lateral joint line.   Neurological:      Mental Status: She is alert and oriented to person, place, and time.   Psychiatric:         Mood and Affect: Mood normal.         Behavior: Behavior normal.         Vital Signs                          Labs:                    Radiology:  No new images today.  Previous imaging results are shown below.    DX-KNEE COMPLETE 4+ LEFT  4 view x-ray of the left knee including AP, PA flex, sunrise, lateral   shows the presence of significant osteoarthritic changes.  This is most   pronounced in the medial tibiofemoral compartment.  There is complete loss   of joint space with many peripheral osteophyte surrounding the patella as   well as subchondral sclerosis.  No fractures noted.         Assessment/Plan:  Pre-Op Diagnosis Codes:     * Degenerative arthritis of left knee [M17.12]  Procedure(s):  LEFT TOTAL KNEE ARTHROPLASTY

## 2023-10-17 NOTE — PREPROCEDURE INSTRUCTIONS
Case message and phone call made to CEASAR:    Just got off the phone with patient who said she had a call in to your office. Patient not feeling well with cold like symptoms including  congestion and a low grade fever several hrs ago. She sounded congested over the phone but reported no other symptoms. Just wanted to alert you. She is currently scheduled for early [id number] tomorrow.

## 2023-10-18 ENCOUNTER — ANESTHESIA EVENT (OUTPATIENT)
Dept: SURGERY | Facility: MEDICAL CENTER | Age: 68
End: 2023-10-18
Payer: COMMERCIAL

## 2023-10-18 ENCOUNTER — ANESTHESIA (OUTPATIENT)
Dept: SURGERY | Facility: MEDICAL CENTER | Age: 68
End: 2023-10-18
Payer: COMMERCIAL

## 2023-10-18 ENCOUNTER — APPOINTMENT (OUTPATIENT)
Dept: RADIOLOGY | Facility: MEDICAL CENTER | Age: 68
End: 2023-10-18
Payer: COMMERCIAL

## 2023-10-18 ENCOUNTER — HOSPITAL ENCOUNTER (OUTPATIENT)
Facility: MEDICAL CENTER | Age: 68
End: 2023-10-18
Attending: ORTHOPAEDIC SURGERY | Admitting: ORTHOPAEDIC SURGERY
Payer: COMMERCIAL

## 2023-10-18 VITALS
DIASTOLIC BLOOD PRESSURE: 66 MMHG | WEIGHT: 271.17 LBS | HEART RATE: 76 BPM | RESPIRATION RATE: 16 BRPM | BODY MASS INDEX: 40.16 KG/M2 | TEMPERATURE: 98.2 F | OXYGEN SATURATION: 89 % | SYSTOLIC BLOOD PRESSURE: 141 MMHG | HEIGHT: 69 IN

## 2023-10-18 DIAGNOSIS — M17.12 PRIMARY OSTEOARTHRITIS OF LEFT KNEE: ICD-10-CM

## 2023-10-18 PROCEDURE — 160009 HCHG ANES TIME/MIN: Performed by: ORTHOPAEDIC SURGERY

## 2023-10-18 PROCEDURE — C1713 ANCHOR/SCREW BN/BN,TIS/BN: HCPCS | Performed by: ORTHOPAEDIC SURGERY

## 2023-10-18 PROCEDURE — 700101 HCHG RX REV CODE 250: Performed by: ORTHOPAEDIC SURGERY

## 2023-10-18 PROCEDURE — 20985 CPTR-ASST DIR MS PX: CPT | Mod: ASROC,LT

## 2023-10-18 PROCEDURE — C1776 JOINT DEVICE (IMPLANTABLE): HCPCS | Performed by: ORTHOPAEDIC SURGERY

## 2023-10-18 PROCEDURE — 27447 TOTAL KNEE ARTHROPLASTY: CPT | Mod: LT | Performed by: ORTHOPAEDIC SURGERY

## 2023-10-18 PROCEDURE — 64447 NJX AA&/STRD FEMORAL NRV IMG: CPT | Performed by: ORTHOPAEDIC SURGERY

## 2023-10-18 PROCEDURE — 502714 HCHG ROBOTIC SURGERY SERVICES: Performed by: ORTHOPAEDIC SURGERY

## 2023-10-18 PROCEDURE — 700111 HCHG RX REV CODE 636 W/ 250 OVERRIDE (IP): Performed by: ANESTHESIOLOGY

## 2023-10-18 PROCEDURE — G0378 HOSPITAL OBSERVATION PER HR: HCPCS

## 2023-10-18 PROCEDURE — 160036 HCHG PACU - EA ADDL 30 MINS PHASE I: Performed by: ORTHOPAEDIC SURGERY

## 2023-10-18 PROCEDURE — 700101 HCHG RX REV CODE 250: Performed by: ANESTHESIOLOGY

## 2023-10-18 PROCEDURE — 700105 HCHG RX REV CODE 258: Performed by: ORTHOPAEDIC SURGERY

## 2023-10-18 PROCEDURE — A9270 NON-COVERED ITEM OR SERVICE: HCPCS

## 2023-10-18 PROCEDURE — 700111 HCHG RX REV CODE 636 W/ 250 OVERRIDE (IP): Mod: JZ | Performed by: ANESTHESIOLOGY

## 2023-10-18 PROCEDURE — 160031 HCHG SURGERY MINUTES - 1ST 30 MINS LEVEL 5: Performed by: ORTHOPAEDIC SURGERY

## 2023-10-18 PROCEDURE — 502000 HCHG MISC OR IMPLANTS RC 0278: Performed by: ORTHOPAEDIC SURGERY

## 2023-10-18 PROCEDURE — 160035 HCHG PACU - 1ST 60 MINS PHASE I: Performed by: ORTHOPAEDIC SURGERY

## 2023-10-18 PROCEDURE — 97162 PT EVAL MOD COMPLEX 30 MIN: CPT

## 2023-10-18 PROCEDURE — 27447 TOTAL KNEE ARTHROPLASTY: CPT | Mod: ASROC,LT

## 2023-10-18 PROCEDURE — 700102 HCHG RX REV CODE 250 W/ 637 OVERRIDE(OP)

## 2023-10-18 PROCEDURE — 20985 CPTR-ASST DIR MS PX: CPT | Mod: LT | Performed by: ORTHOPAEDIC SURGERY

## 2023-10-18 PROCEDURE — 73560 X-RAY EXAM OF KNEE 1 OR 2: CPT | Mod: LT

## 2023-10-18 PROCEDURE — 160042 HCHG SURGERY MINUTES - EA ADDL 1 MIN LEVEL 5: Performed by: ORTHOPAEDIC SURGERY

## 2023-10-18 PROCEDURE — 160002 HCHG RECOVERY MINUTES (STAT): Performed by: ORTHOPAEDIC SURGERY

## 2023-10-18 PROCEDURE — 160048 HCHG OR STATISTICAL LEVEL 1-5: Performed by: ORTHOPAEDIC SURGERY

## 2023-10-18 PROCEDURE — 700111 HCHG RX REV CODE 636 W/ 250 OVERRIDE (IP): Performed by: ORTHOPAEDIC SURGERY

## 2023-10-18 DEVICE — COMPONENT FEMORAL TRIATHLON CRUCIATE RETAIN LEFT SIZE 4 (1EA): Type: IMPLANTABLE DEVICE | Status: FUNCTIONAL

## 2023-10-18 DEVICE — IMPLANTABLE DEVICE: Type: IMPLANTABLE DEVICE | Status: FUNCTIONAL

## 2023-10-18 RX ORDER — DEXAMETHASONE SODIUM PHOSPHATE 4 MG/ML
INJECTION, SOLUTION INTRA-ARTICULAR; INTRALESIONAL; INTRAMUSCULAR; INTRAVENOUS; SOFT TISSUE PRN
Status: DISCONTINUED | OUTPATIENT
Start: 2023-10-18 | End: 2023-10-18 | Stop reason: SURG

## 2023-10-18 RX ORDER — MEPERIDINE HYDROCHLORIDE 25 MG/ML
12.5 INJECTION INTRAMUSCULAR; INTRAVENOUS; SUBCUTANEOUS
Status: DISCONTINUED | OUTPATIENT
Start: 2023-10-18 | End: 2023-10-18 | Stop reason: HOSPADM

## 2023-10-18 RX ORDER — DEXAMETHASONE SODIUM PHOSPHATE 4 MG/ML
4 INJECTION, SOLUTION INTRA-ARTICULAR; INTRALESIONAL; INTRAMUSCULAR; INTRAVENOUS; SOFT TISSUE
Status: DISCONTINUED | OUTPATIENT
Start: 2023-10-18 | End: 2023-10-18 | Stop reason: HOSPADM

## 2023-10-18 RX ORDER — ATORVASTATIN CALCIUM 20 MG/1
20 TABLET, FILM COATED ORAL DAILY
Status: DISCONTINUED | OUTPATIENT
Start: 2023-10-18 | End: 2023-10-18 | Stop reason: HOSPADM

## 2023-10-18 RX ORDER — METOPROLOL SUCCINATE 25 MG/1
12.5 TABLET, EXTENDED RELEASE ORAL EVERY EVENING
Status: DISCONTINUED | OUTPATIENT
Start: 2023-10-18 | End: 2023-10-18 | Stop reason: HOSPADM

## 2023-10-18 RX ORDER — ONDANSETRON 2 MG/ML
INJECTION INTRAMUSCULAR; INTRAVENOUS PRN
Status: DISCONTINUED | OUTPATIENT
Start: 2023-10-18 | End: 2023-10-18 | Stop reason: SURG

## 2023-10-18 RX ORDER — LOSARTAN POTASSIUM 50 MG/1
50 TABLET ORAL 2 TIMES DAILY
Status: DISCONTINUED | OUTPATIENT
Start: 2023-10-18 | End: 2023-10-18 | Stop reason: HOSPADM

## 2023-10-18 RX ORDER — ACETAMINOPHEN 500 MG
1000 TABLET ORAL EVERY 6 HOURS PRN
Status: DISCONTINUED | OUTPATIENT
Start: 2023-10-23 | End: 2023-10-18 | Stop reason: HOSPADM

## 2023-10-18 RX ORDER — MIDAZOLAM HYDROCHLORIDE 1 MG/ML
INJECTION INTRAMUSCULAR; INTRAVENOUS PRN
Status: DISCONTINUED | OUTPATIENT
Start: 2023-10-18 | End: 2023-10-18 | Stop reason: SURG

## 2023-10-18 RX ORDER — HALOPERIDOL 5 MG/ML
1 INJECTION INTRAMUSCULAR EVERY 6 HOURS PRN
Status: DISCONTINUED | OUTPATIENT
Start: 2023-10-18 | End: 2023-10-18 | Stop reason: HOSPADM

## 2023-10-18 RX ORDER — AMOXICILLIN 250 MG
1 CAPSULE ORAL NIGHTLY
Status: DISCONTINUED | OUTPATIENT
Start: 2023-10-18 | End: 2023-10-18 | Stop reason: HOSPADM

## 2023-10-18 RX ORDER — ONDANSETRON 2 MG/ML
4 INJECTION INTRAMUSCULAR; INTRAVENOUS
Status: DISCONTINUED | OUTPATIENT
Start: 2023-10-18 | End: 2023-10-18 | Stop reason: HOSPADM

## 2023-10-18 RX ORDER — BUPIVACAINE HYDROCHLORIDE 2.5 MG/ML
INJECTION, SOLUTION EPIDURAL; INFILTRATION; INTRACAUDAL
Status: DISCONTINUED | OUTPATIENT
Start: 2023-10-18 | End: 2023-10-18 | Stop reason: HOSPADM

## 2023-10-18 RX ORDER — HYDRALAZINE HYDROCHLORIDE 20 MG/ML
5 INJECTION INTRAMUSCULAR; INTRAVENOUS
Status: DISCONTINUED | OUTPATIENT
Start: 2023-10-18 | End: 2023-10-18 | Stop reason: HOSPADM

## 2023-10-18 RX ORDER — ONDANSETRON 2 MG/ML
4 INJECTION INTRAMUSCULAR; INTRAVENOUS EVERY 4 HOURS PRN
Status: DISCONTINUED | OUTPATIENT
Start: 2023-10-18 | End: 2023-10-18 | Stop reason: HOSPADM

## 2023-10-18 RX ORDER — HYDRALAZINE HYDROCHLORIDE 20 MG/ML
INJECTION INTRAMUSCULAR; INTRAVENOUS PRN
Status: DISCONTINUED | OUTPATIENT
Start: 2023-10-18 | End: 2023-10-18 | Stop reason: SURG

## 2023-10-18 RX ORDER — DIPHENHYDRAMINE HYDROCHLORIDE 50 MG/ML
25 INJECTION INTRAMUSCULAR; INTRAVENOUS EVERY 6 HOURS PRN
Status: DISCONTINUED | OUTPATIENT
Start: 2023-10-18 | End: 2023-10-18 | Stop reason: HOSPADM

## 2023-10-18 RX ORDER — TRANEXAMIC ACID 100 MG/ML
INJECTION, SOLUTION INTRAVENOUS PRN
Status: DISCONTINUED | OUTPATIENT
Start: 2023-10-18 | End: 2023-10-18 | Stop reason: SURG

## 2023-10-18 RX ORDER — MONTELUKAST SODIUM 10 MG/1
10 TABLET ORAL DAILY
Status: DISCONTINUED | OUTPATIENT
Start: 2023-10-18 | End: 2023-10-18 | Stop reason: HOSPADM

## 2023-10-18 RX ORDER — GABAPENTIN 300 MG/1
300 CAPSULE ORAL 3 TIMES DAILY
Status: DISCONTINUED | OUTPATIENT
Start: 2023-10-18 | End: 2023-10-18 | Stop reason: HOSPADM

## 2023-10-18 RX ORDER — EPINEPHRINE 1 MG/ML(1)
AMPUL (ML) INJECTION
Status: DISCONTINUED | OUTPATIENT
Start: 2023-10-18 | End: 2023-10-18 | Stop reason: HOSPADM

## 2023-10-18 RX ORDER — AZELASTINE HYDROCHLORIDE 137 UG/1
2 SPRAY, METERED NASAL 2 TIMES DAILY
Status: DISCONTINUED | OUTPATIENT
Start: 2023-10-18 | End: 2023-10-18

## 2023-10-18 RX ORDER — SODIUM CHLORIDE, SODIUM LACTATE, POTASSIUM CHLORIDE, CALCIUM CHLORIDE 600; 310; 30; 20 MG/100ML; MG/100ML; MG/100ML; MG/100ML
INJECTION, SOLUTION INTRAVENOUS CONTINUOUS
Status: DISCONTINUED | OUTPATIENT
Start: 2023-10-18 | End: 2023-10-18 | Stop reason: HOSPADM

## 2023-10-18 RX ORDER — KETOROLAC TROMETHAMINE 30 MG/ML
INJECTION, SOLUTION INTRAMUSCULAR; INTRAVENOUS
Status: DISCONTINUED | OUTPATIENT
Start: 2023-10-18 | End: 2023-10-18 | Stop reason: HOSPADM

## 2023-10-18 RX ORDER — BUDESONIDE AND FORMOTEROL FUMARATE DIHYDRATE 160; 4.5 UG/1; UG/1
1 AEROSOL RESPIRATORY (INHALATION) DAILY
Status: DISCONTINUED | OUTPATIENT
Start: 2023-10-19 | End: 2023-10-18

## 2023-10-18 RX ORDER — MIDAZOLAM HYDROCHLORIDE 1 MG/ML
1 INJECTION INTRAMUSCULAR; INTRAVENOUS
Status: DISCONTINUED | OUTPATIENT
Start: 2023-10-18 | End: 2023-10-18 | Stop reason: HOSPADM

## 2023-10-18 RX ORDER — BISACODYL 10 MG
10 SUPPOSITORY, RECTAL RECTAL
Status: DISCONTINUED | OUTPATIENT
Start: 2023-10-18 | End: 2023-10-18 | Stop reason: HOSPADM

## 2023-10-18 RX ORDER — SCOLOPAMINE TRANSDERMAL SYSTEM 1 MG/1
1 PATCH, EXTENDED RELEASE TRANSDERMAL
Status: DISCONTINUED | OUTPATIENT
Start: 2023-10-18 | End: 2023-10-18 | Stop reason: HOSPADM

## 2023-10-18 RX ORDER — CEFAZOLIN SODIUM 1 G/3ML
INJECTION, POWDER, FOR SOLUTION INTRAMUSCULAR; INTRAVENOUS PRN
Status: DISCONTINUED | OUTPATIENT
Start: 2023-10-18 | End: 2023-10-18 | Stop reason: SURG

## 2023-10-18 RX ORDER — TRAMADOL HYDROCHLORIDE 50 MG/1
50 TABLET ORAL EVERY 4 HOURS PRN
Status: DISCONTINUED | OUTPATIENT
Start: 2023-10-18 | End: 2023-10-18 | Stop reason: HOSPADM

## 2023-10-18 RX ORDER — SODIUM CHLORIDE 9 MG/ML
INJECTION, SOLUTION INTRAMUSCULAR; INTRAVENOUS; SUBCUTANEOUS
Status: DISCONTINUED | OUTPATIENT
Start: 2023-10-18 | End: 2023-10-18 | Stop reason: HOSPADM

## 2023-10-18 RX ORDER — HYDROMORPHONE HYDROCHLORIDE 1 MG/ML
0.2 INJECTION, SOLUTION INTRAMUSCULAR; INTRAVENOUS; SUBCUTANEOUS
Status: DISCONTINUED | OUTPATIENT
Start: 2023-10-18 | End: 2023-10-18 | Stop reason: HOSPADM

## 2023-10-18 RX ORDER — LIDOCAINE HYDROCHLORIDE 20 MG/ML
INJECTION, SOLUTION EPIDURAL; INFILTRATION; INTRACAUDAL; PERINEURAL PRN
Status: DISCONTINUED | OUTPATIENT
Start: 2023-10-18 | End: 2023-10-18 | Stop reason: SURG

## 2023-10-18 RX ORDER — DEXAMETHASONE SODIUM PHOSPHATE 4 MG/ML
4 INJECTION, SOLUTION INTRA-ARTICULAR; INTRALESIONAL; INTRAMUSCULAR; INTRAVENOUS; SOFT TISSUE ONCE
Status: DISCONTINUED | OUTPATIENT
Start: 2023-10-19 | End: 2023-10-18 | Stop reason: HOSPADM

## 2023-10-18 RX ORDER — OMEPRAZOLE 20 MG/1
20 CAPSULE, DELAYED RELEASE ORAL EVERY MORNING
Status: DISCONTINUED | OUTPATIENT
Start: 2023-10-19 | End: 2023-10-18 | Stop reason: HOSPADM

## 2023-10-18 RX ORDER — DOCUSATE SODIUM 100 MG/1
100 CAPSULE, LIQUID FILLED ORAL 2 TIMES DAILY
Status: DISCONTINUED | OUTPATIENT
Start: 2023-10-18 | End: 2023-10-18 | Stop reason: HOSPADM

## 2023-10-18 RX ORDER — FAMOTIDINE 20 MG/1
20 TABLET, FILM COATED ORAL NIGHTLY
Status: DISCONTINUED | OUTPATIENT
Start: 2023-10-18 | End: 2023-10-18 | Stop reason: HOSPADM

## 2023-10-18 RX ORDER — HYDROMORPHONE HYDROCHLORIDE 1 MG/ML
0.1 INJECTION, SOLUTION INTRAMUSCULAR; INTRAVENOUS; SUBCUTANEOUS
Status: DISCONTINUED | OUTPATIENT
Start: 2023-10-18 | End: 2023-10-18 | Stop reason: HOSPADM

## 2023-10-18 RX ORDER — ALBUTEROL SULFATE 90 UG/1
2 AEROSOL, METERED RESPIRATORY (INHALATION) EVERY 6 HOURS PRN
Status: DISCONTINUED | OUTPATIENT
Start: 2023-10-18 | End: 2023-10-18 | Stop reason: HOSPADM

## 2023-10-18 RX ORDER — CEFAZOLIN SODIUM 1 G/3ML
3 INJECTION, POWDER, FOR SOLUTION INTRAMUSCULAR; INTRAVENOUS ONCE
Status: DISCONTINUED | OUTPATIENT
Start: 2023-10-18 | End: 2023-10-18 | Stop reason: HOSPADM

## 2023-10-18 RX ORDER — ACETAMINOPHEN 500 MG
1000 TABLET ORAL EVERY 6 HOURS
Status: DISCONTINUED | OUTPATIENT
Start: 2023-10-18 | End: 2023-10-18 | Stop reason: HOSPADM

## 2023-10-18 RX ORDER — HALOPERIDOL 5 MG/ML
1 INJECTION INTRAMUSCULAR
Status: DISCONTINUED | OUTPATIENT
Start: 2023-10-18 | End: 2023-10-18 | Stop reason: HOSPADM

## 2023-10-18 RX ORDER — HYDROMORPHONE HYDROCHLORIDE 1 MG/ML
0.4 INJECTION, SOLUTION INTRAMUSCULAR; INTRAVENOUS; SUBCUTANEOUS
Status: DISCONTINUED | OUTPATIENT
Start: 2023-10-18 | End: 2023-10-18 | Stop reason: HOSPADM

## 2023-10-18 RX ORDER — SODIUM CHLORIDE, SODIUM LACTATE, POTASSIUM CHLORIDE, CALCIUM CHLORIDE 600; 310; 30; 20 MG/100ML; MG/100ML; MG/100ML; MG/100ML
INJECTION, SOLUTION INTRAVENOUS CONTINUOUS
Status: ACTIVE | OUTPATIENT
Start: 2023-10-18 | End: 2023-10-18

## 2023-10-18 RX ORDER — AMOXICILLIN 250 MG
1 CAPSULE ORAL
Status: DISCONTINUED | OUTPATIENT
Start: 2023-10-18 | End: 2023-10-18 | Stop reason: HOSPADM

## 2023-10-18 RX ORDER — ENEMA 19; 7 G/133ML; G/133ML
1 ENEMA RECTAL
Status: DISCONTINUED | OUTPATIENT
Start: 2023-10-18 | End: 2023-10-18 | Stop reason: HOSPADM

## 2023-10-18 RX ORDER — DIPHENHYDRAMINE HYDROCHLORIDE 50 MG/ML
12.5 INJECTION INTRAMUSCULAR; INTRAVENOUS
Status: DISCONTINUED | OUTPATIENT
Start: 2023-10-18 | End: 2023-10-18 | Stop reason: HOSPADM

## 2023-10-18 RX ORDER — EPHEDRINE SULFATE 50 MG/ML
INJECTION, SOLUTION INTRAVENOUS PRN
Status: DISCONTINUED | OUTPATIENT
Start: 2023-10-18 | End: 2023-10-18 | Stop reason: SURG

## 2023-10-18 RX ORDER — ROPIVACAINE HYDROCHLORIDE 5 MG/ML
INJECTION, SOLUTION EPIDURAL; INFILTRATION; PERINEURAL PRN
Status: DISCONTINUED | OUTPATIENT
Start: 2023-10-18 | End: 2023-10-18 | Stop reason: SURG

## 2023-10-18 RX ORDER — POLYETHYLENE GLYCOL 3350 17 G/17G
1 POWDER, FOR SOLUTION ORAL 2 TIMES DAILY PRN
Status: DISCONTINUED | OUTPATIENT
Start: 2023-10-18 | End: 2023-10-18 | Stop reason: HOSPADM

## 2023-10-18 RX ADMIN — FENTANYL CITRATE 50 MCG: 50 INJECTION, SOLUTION INTRAMUSCULAR; INTRAVENOUS at 07:33

## 2023-10-18 RX ADMIN — SODIUM CHLORIDE, POTASSIUM CHLORIDE, SODIUM LACTATE AND CALCIUM CHLORIDE: 600; 310; 30; 20 INJECTION, SOLUTION INTRAVENOUS at 07:08

## 2023-10-18 RX ADMIN — HYDROMORPHONE HYDROCHLORIDE 0.4 MG: 1 INJECTION, SOLUTION INTRAMUSCULAR; INTRAVENOUS; SUBCUTANEOUS at 09:05

## 2023-10-18 RX ADMIN — TRANEXAMIC ACID 1000 MG: 100 INJECTION, SOLUTION INTRAVENOUS at 07:18

## 2023-10-18 RX ADMIN — FENTANYL CITRATE 50 MCG: 50 INJECTION, SOLUTION INTRAMUSCULAR; INTRAVENOUS at 07:49

## 2023-10-18 RX ADMIN — FENTANYL CITRATE 100 MCG: 50 INJECTION, SOLUTION INTRAMUSCULAR; INTRAVENOUS at 07:10

## 2023-10-18 RX ADMIN — HYDROMORPHONE HYDROCHLORIDE 0.4 MG: 1 INJECTION, SOLUTION INTRAMUSCULAR; INTRAVENOUS; SUBCUTANEOUS at 09:25

## 2023-10-18 RX ADMIN — DEXAMETHASONE SODIUM PHOSPHATE 1 MG: 4 INJECTION INTRA-ARTICULAR; INTRALESIONAL; INTRAMUSCULAR; INTRAVENOUS; SOFT TISSUE at 07:01

## 2023-10-18 RX ADMIN — HYDROMORPHONE HYDROCHLORIDE 0.2 MG: 1 INJECTION, SOLUTION INTRAMUSCULAR; INTRAVENOUS; SUBCUTANEOUS at 09:38

## 2023-10-18 RX ADMIN — MIDAZOLAM 2 MG: 1 INJECTION, SOLUTION INTRAMUSCULAR; INTRAVENOUS at 06:54

## 2023-10-18 RX ADMIN — HYDRALAZINE HYDROCHLORIDE 5 MG: 20 INJECTION, SOLUTION INTRAMUSCULAR; INTRAVENOUS at 11:05

## 2023-10-18 RX ADMIN — PROPOFOL 200 MG: 10 INJECTION, EMULSION INTRAVENOUS at 07:12

## 2023-10-18 RX ADMIN — DEXAMETHASONE SODIUM PHOSPHATE 7 MG: 4 INJECTION INTRA-ARTICULAR; INTRALESIONAL; INTRAMUSCULAR; INTRAVENOUS; SOFT TISSUE at 07:20

## 2023-10-18 RX ADMIN — ROPIVACAINE HYDROCHLORIDE 30 ML: 5 INJECTION EPIDURAL; INFILTRATION; PERINEURAL at 07:01

## 2023-10-18 RX ADMIN — ACETAMINOPHEN 1000 MG: 500 TABLET ORAL at 12:41

## 2023-10-18 RX ADMIN — LIDOCAINE HYDROCHLORIDE 60 MG: 20 INJECTION, SOLUTION EPIDURAL; INFILTRATION; INTRACAUDAL at 07:12

## 2023-10-18 RX ADMIN — HYDRALAZINE HYDROCHLORIDE 5 MG: 20 INJECTION, SOLUTION INTRAMUSCULAR; INTRAVENOUS at 10:05

## 2023-10-18 RX ADMIN — HYDROMORPHONE HYDROCHLORIDE 0.4 MG: 1 INJECTION, SOLUTION INTRAMUSCULAR; INTRAVENOUS; SUBCUTANEOUS at 09:13

## 2023-10-18 RX ADMIN — ONDANSETRON 4 MG: 2 INJECTION INTRAMUSCULAR; INTRAVENOUS at 08:26

## 2023-10-18 RX ADMIN — HYDRALAZINE HYDROCHLORIDE 10 MG: 20 INJECTION, SOLUTION INTRAMUSCULAR; INTRAVENOUS at 07:49

## 2023-10-18 RX ADMIN — CEFAZOLIN 3 G: 1 INJECTION, POWDER, FOR SOLUTION INTRAMUSCULAR; INTRAVENOUS at 07:20

## 2023-10-18 RX ADMIN — EPHEDRINE SULFATE 10 MG: 50 INJECTION INTRAVENOUS at 07:18

## 2023-10-18 ASSESSMENT — LIFESTYLE VARIABLES
TOTAL SCORE: 0
EVER HAD A DRINK FIRST THING IN THE MORNING TO STEADY YOUR NERVES TO GET RID OF A HANGOVER: NO
AVERAGE NUMBER OF DAYS PER WEEK YOU HAVE A DRINK CONTAINING ALCOHOL: 0
CONSUMPTION TOTAL: NEGATIVE
HAVE PEOPLE ANNOYED YOU BY CRITICIZING YOUR DRINKING: NO
ON A TYPICAL DAY WHEN YOU DRINK ALCOHOL HOW MANY DRINKS DO YOU HAVE: 0
TOTAL SCORE: 0
ALCOHOL_USE: NO
HOW MANY TIMES IN THE PAST YEAR HAVE YOU HAD 5 OR MORE DRINKS IN A DAY: 0
TOTAL SCORE: 0
HAVE YOU EVER FELT YOU SHOULD CUT DOWN ON YOUR DRINKING: NO
EVER FELT BAD OR GUILTY ABOUT YOUR DRINKING: NO

## 2023-10-18 ASSESSMENT — COGNITIVE AND FUNCTIONAL STATUS - GENERAL
HELP NEEDED FOR BATHING: A LITTLE
SUGGESTED CMS G CODE MODIFIER MOBILITY: CJ
MOBILITY SCORE: 20
DAILY ACTIVITIY SCORE: 22
MOBILITY SCORE: 24
TURNING FROM BACK TO SIDE WHILE IN FLAT BAD: A LITTLE
CLIMB 3 TO 5 STEPS WITH RAILING: A LITTLE
DRESSING REGULAR LOWER BODY CLOTHING: A LITTLE
MOVING TO AND FROM BED TO CHAIR: A LITTLE
MOVING FROM LYING ON BACK TO SITTING ON SIDE OF FLAT BED: A LITTLE
SUGGESTED CMS G CODE MODIFIER MOBILITY: CH
SUGGESTED CMS G CODE MODIFIER DAILY ACTIVITY: CJ

## 2023-10-18 ASSESSMENT — GAIT ASSESSMENTS
DEVIATION: STEP TO
DISTANCE (FEET): 100
GAIT LEVEL OF ASSIST: SUPERVISED
ASSISTIVE DEVICE: FRONT WHEEL WALKER

## 2023-10-18 ASSESSMENT — PAIN DESCRIPTION - PAIN TYPE
TYPE: SURGICAL PAIN
TYPE: ACUTE PAIN
TYPE: SURGICAL PAIN
TYPE: SURGICAL PAIN
TYPE: CHRONIC PAIN

## 2023-10-18 ASSESSMENT — PATIENT HEALTH QUESTIONNAIRE - PHQ9
SUM OF ALL RESPONSES TO PHQ9 QUESTIONS 1 AND 2: 0
2. FEELING DOWN, DEPRESSED, IRRITABLE, OR HOPELESS: NOT AT ALL
1. LITTLE INTEREST OR PLEASURE IN DOING THINGS: NOT AT ALL

## 2023-10-18 ASSESSMENT — FIBROSIS 4 INDEX
FIB4 SCORE: 1.23
FIB4 SCORE: 1.23

## 2023-10-18 ASSESSMENT — PAIN SCALES - GENERAL: PAIN_LEVEL: 4

## 2023-10-18 NOTE — PROGRESS NOTES
Received report from PACU RN. Assumed pt care upon arrival to unit. Pt arrived on a gurney awake and alert. Pt was able to sit to edge of gurney and ambulate using FWW, up to bathroom + void then then back to chair. Pt denies pain with movement.   Pt has not needed any supplemental O@ requirements and has maintained room air O2 sats of 93-96%. While awake and at rest.   Plan of care reviewed for activities and goals this shift. Medication eduction provided.  Pt verbalizes understanding of plan of care for this shift.  Pt denies pain and/or nausea at this moment.  Patients needs attended well. Fall precautions in place. Bed at lowest position. Call light and personal belongings within reach.   Hourly rounding in progress.   Pt up I chair eating lunch meal no distress noted. VSS   Pt remains stable for this visit. Pt ambulatory, steady and verbalizes understanding of surgical precautions.   Pt able to dress self. Pain level at a tolerable comfort level to perform activities.   VSS

## 2023-10-18 NOTE — THERAPY
Physical Therapy   Initial Evaluation     Patient Name: Sara Knight  Age:  67 y.o., Sex:  female  Medical Record #: [id number]  Today's Date: 10/18/2023     Precautions  Precautions: (P) Weight Bearing As Tolerated Left Lower Extremity    Assessment  Patient is 67 y.o. female with a diagnosis of L TKR Pt lives at home with  and is active.Pt is safe with bed mob,transfers and ambulation.She understands HEP and has no equipment needs.      Plan    DC Equipment Recommendations: (P) None  Discharge Recommendations: (P) Recommend outpatient physical therapy services to address higher level deficits       Objective       10/18/23 1300   Charge Group   PT Evaluation PT Evaluation Mod   Total Time Spent   PT Evaluation Time Spent (Mins) 30   Initial Contact Note    Initial Contact Note Order Received and Verified, Physical Therapy Evaluation in Progress with Full Report to Follow.   Precautions   Precautions Weight Bearing As Tolerated Left Lower Extremity   Vitals   Pulse 76   Patient BP Position Sitting   Blood Pressure  (!) 141/66   Respiration 16   Pulse Oximetry 89 %   O2 Delivery Device None - Room Air   Prior Living Situation   Prior Services None   Housing / Facility 1 Story House   Steps Into Home 2   Steps In Home 0   Equipment Owned 4-Wheel Walker   Lives with - Patient's Self Care Capacity Spouse   Prior Level of Functional Mobility   Bed Mobility Independent   Transfer Status Independent   Ambulation Independent   Ambulation Distance community   Assistive Devices Used None   Stairs Independent   Cognition    Cognition / Consciousness WDL   Passive ROM Lower Body   Passive ROM Lower Body X   Lt Knee Flexion Degrees 90   Lt Knee Extension Degrees 0   Active ROM Lower Body    Active ROM Lower Body  X   Strength Lower Body   Lower Body Strength  X   Coordination Lower Body    Coordination Lower Body  WDL   Balance Assessment   Sitting Balance (Static) Good   Sitting Balance (Dynamic) Good   Standing  Balance (Static) Good   Standing Balance (Dynamic) Good   Weight Shift Sitting Good   Weight Shift Standing Good   Gait Analysis   Gait Level Of Assist Supervised   Assistive Device Front Wheel Walker   Distance (Feet) 100   # of Times Distance was Traveled 2   Deviation Step To   # of Stairs Climbed 0   Weight Bearing Status wbat L   Functional Mobility   Sit to Stand Supervised   Bed, Chair, Wheelchair Transfer Supervised   Transfer Method Stand Step   How much difficulty does the patient currently have...   Turning over in bed (including adjusting bedclothes, sheets and blankets)? 4   Sitting down on and standing up from a chair with arms (e.g., wheelchair, bedside commode, etc.) 4   Moving from lying on back to sitting on the side of the bed? 4   How much help from another person does the patient currently need...   Moving to and from a bed to a chair (including a wheelchair)? 4   Need to walk in a hospital room? 4   Climbing 3-5 steps with a railing? 4   6 clicks Mobility Score 24   Activity Tolerance   Sitting in Chair > 1hr   Standing 8   Patient / Family Goals    Patient / Family Goal #1 Home   Anticipated Discharge Equipment and Recommendations   DC Equipment Recommendations None   Discharge Recommendations Recommend outpatient physical therapy services to address higher level deficits   Interdisciplinary Plan of Care Collaboration   IDT Collaboration with  Nursing   Session Information   Date / Session Number  10/18   Priority 0

## 2023-10-18 NOTE — ANESTHESIA PROCEDURE NOTES
Airway    Date/Time: 10/18/2023 7:13 AM    Performed by: Kevin Armstrong M.D.  Authorized by: Kevin Armstrong M.D.    Location:  OR  Urgency:  Elective  Difficult Airway: No    Indications for Airway Management:  Anesthesia      Spontaneous Ventilation: absent    Sedation Level:  Deep  Preoxygenated: Yes    Final Airway Type:  Supraglottic airway  Final Supraglottic Airway:  Standard LMA    SGA Size:  3  Number of Attempts at Approach:  1  Number of Other Approaches Attempted:  0

## 2023-10-18 NOTE — ANESTHESIA PROCEDURE NOTES
Peripheral Block    Date/Time: 10/18/2023 6:55 AM    Performed by: Kevin Armstrong M.D.  Authorized by: Kevin Armstrong M.D.    Patient Location:  Pre-op  Start Time:  10/18/2023 6:55 AM  End Time:  10/18/2023 7:03 AM  Reason for Block: at surgeon's request and post-op pain management ONLY    patient identified, IV checked, site marked, risks and benefits discussed, surgical consent, monitors and equipment checked, pre-op evaluation and timeout performed    Patient Position:  Supine  Prep: ChloraPrep    Monitoring:  Heart rate, continuous pulse ox and cardiac monitor  Block Region:  Lower Extremity  Lower Extremity - Block Type:  Selective FEMORAL nerve block at the Adductor Canal    Laterality:  Left  Procedures: ultrasound guided  Image captured, interpreted and electronically stored.  Local Infiltration:  Lidocaine  Strength:  1 %  Dose:  3 ml  Block Type:  Single-shot  Needle Length:  100mm  Needle Gauge:  21 G  Needle Localization:  Ultrasound guidance  Injection Assessment:  Negative aspiration for heme, no paresthesia on injection, incremental injection and local visualized surrounding nerve on ultrasound  Evidence of intravascular injection: No

## 2023-10-18 NOTE — ANESTHESIA POSTPROCEDURE EVALUATION
Patient: Sara Knight    Procedure Summary     Date: 10/18/23 Room / Location: [place] [address] / SURGERY [facility]    Anesthesia Start: 0708 Anesthesia Stop: 0842    Procedure: LEFT TOTAL KNEE ARTHROPLASTY - KYLEE (Left: Knee) Diagnosis:       Degenerative arthritis of left knee      (Degenerative arthritis of left knee [M17.12])    Surgeons: Jarrell Harris M.D. Responsible Provider: Kevin Armstrong M.D.    Anesthesia Type: general, peripheral nerve block ASA Status: 3          Final Anesthesia Type: general, peripheral nerve block  Last vitals  BP   Blood Pressure : (!) 157/69    Temp   36.9 °C (98.4 °F)    Pulse   100   Resp   (!) 22    SpO2   98 %      Anesthesia Post Evaluation    Patient location during evaluation: PACU  Patient participation: complete - patient participated  Level of consciousness: awake and alert  Pain score: 4    Airway patency: patent  Anesthetic complications: no  Cardiovascular status: hemodynamically stable  Respiratory status: acceptable  Hydration status: euvolemic    PONV: none          No notable events documented.     Nurse Pain Score: 4 (NPRS)

## 2023-10-18 NOTE — LETTER
September 6, 2023    Patient Name: Sara Knight  Surgeon Name: Jarrell Harris M.D.  Surgery Facility: [facility] ([address] [facility])  Surgery Date: 10/18/2023    The time of your surgery is not final and may change up to and until the day of your surgery. You will be contacted 24-48 hours prior to your surgery date with your check-in and surgery time.    If you will not be at one of the below numbers please call the surgery scheduler at [phone]  Preferred Phone: [phone]    BEFORE YOUR SURGERY   Pre Registration and/or Lab Work must be done within and no earlier than 28 days prior to your surgery date. Your scheduled facility will contact you regarding all required preregistration and/or lab work. If you have not been contacted within 7 days of your scheduled procedure please call [facility] at [phone] for an appointment as soon as possible.    Pre op Appointment:   Date: 10/9   Time: 3 PM   Provider: Jarrell Harris MD   Location: OVER THE PHONE - DR HARRIS OR BOOM WILL CALL YOU   Instructions: Bring a list of all medications you are taking including the dosing and frequency.    - Please  your non-narcotic prescriptions prior to surgery at the pharmacy you provided us. DON'T START them until after your surgery.    DAY OF YOUR SURGERY  Nothing to eat or drink after midnight     Refrain from smoking any substance after midnight prior to surgery. Smoking may interfere with the anesthetic and frequently produces nausea during the recovery period.    Continue taking all lifesaving medications. Including the morning of your surgery with small sip of water.    Please do NOT take on the day of surgery:  Diuretics: examples- furosemide (Lasix), spironolactone, hydrochlorothiazide  ACE-inhibitors: examples- lisinopril, ramipril, enalapril  “ARBs”: examples- losartan, Olmesartan, valsartan    Please arrive at the hospital/surgery center  at the check-in time provided.     An adult will need to bring you and take you home after your surgery.     AFTER YOUR SURGERY  Post op Appointment:   Date: 10/30   Time: 1:40 PM   With: Jarrell Harris MD   Location[facility], [place] [address]    - Therapy- Your first appointment should be 1  week(s) after your surgery. For your convenience we have 4 Physical Therapy locations: [place], [facility], [place], and [facility]. Call our office ASAP to schedule an appointment at [phone] or take the enclosed Therapy Prescription to a facility of your choice.  - No dental work for 3-6 months after your surgery.  - You must have someone provide transportation post surgery and someone to monitor you for at least 24 hours post-surgery. If you don't have either of these your appointment will be canceled.       TIME OFF WORK  FMLA or Disability forms can be faxed directly to: [phone] or you may drop them off at [address] [facility], [place] [address]. Our office charges a $35.00 fee per form. Forms will be completed within 10 business days of drop off and payment received. For the status of your forms you may contact our disability office directly [phone].

## 2023-10-18 NOTE — DISCHARGE INSTRUCTIONS
If any questions arise, call your provider.  If your provider is not available, please feel free to call the Surgical Center at [phone].    MEDICATIONS: Resume taking daily medication.  Take prescribed pain medication with food.  If no medication is prescribed, you may take non-aspirin pain medication if needed.  PAIN MEDICATION CAN BE VERY CONSTIPATING.  Take a stool softener or laxative such as senokot, pericolace, or milk of magnesia if needed.    Last pain medication given: No oral pain medication given     What to Expect Post Anesthesia    Rest and take it easy for the first 24 hours.  A responsible adult is recommended to remain with you during that time.  It is normal to feel sleepy.  We encourage you to not do anything that requires balance, judgment or coordination.    FOR 24 HOURS DO NOT:  Drive, operate machinery or run household appliances.  Drink beer or alcoholic beverages.  Make important decisions or sign legal documents.    To avoid nausea, slowly advance diet as tolerated, avoiding spicy or greasy foods for the first day.  Add more substantial food to your diet according to your provider's instructions.  Babies can be fed formula or breast milk as soon as they are hungry.  INCREASE FLUIDS AND FIBER TO AVOID CONSTIPATION.    MILD FLU-LIKE SYMPTOMS ARE NORMAL.  YOU MAY EXPERIENCE GENERALIZED MUSCLE ACHES, THROAT IRRITATION, HEADACHE AND/OR SOME NAUSEA.      Dr. Harris's Home Care Instructions:  Ambulate every 2-3 hours while awake   OK to walk outside if comfortable   Ice to knee every 2-3 hours while awake   OK to sleep in any position that is comfortable   No sitting more than 2 hours at a time    [facility] Total Hip Replacement Discharge Instructions    ACTIVITY  The first week after your hip replacement is a time to recover from the surgery. We expect light exercise to keep you active and mobile.    Posterior Hip Precautions  -Don't bend your hip past a 90 degree angle.  -Don't  cross your legs.  -Don't twist your hip inwards-keep knees and toes pointed upwards.     ASSISTED DEVICES: You are being discharged with the following special equipment:  Walker    DRESSING AND WOUND CARE   Keep dressing clean and dry. Leave dressing in place until follow up.     SHOWER / BATHING   OK to shower, keep dressing in place. Pat dressing dry, do not rub incision.  Swimming pools, hot tubs, or baths are to be avoided until after your follow up and then only if cleared by your surgeon.      APPLY ICE PACK TO HIP   Apply ice packs to your hip (15 minutes on the hip, 15 minutes off the hip) for the first week, as you feel necessary to help with the pain and swelling.    SWELLING/BRUISING  Swelling is an expected response to surgery. To reduce swelling, elevate your surgical limb above heart level multiple times per day and apply ice (see Ice instructions). If your swelling becomes excessive, is limiting your ability to move, or becomes worrisome please contact your doctor's office.    Bruising often does not appear until after you arrive home and can be quite dramatic-appearing purple, black, or green. Bruising is typically not concerning and will subside without any treatment.     FOLLOW-UP  Make sure that you have an appointment 7-14 days following surgery.Your procedure/rehabilitation will be discussed and physical therapy may be prescribed at that time.        Peripheral Nerve Block Discharge Instructions from Same Day Surgery and Inpatient :    What to Expect - Lower Extremity  The block may cause you to experience numbness and weakness in your thigh and knee or calf and foot on the same side as your surgery  Numbness, tingling and / or weakness are all normal. For some people, this may be an unpleasant sensation  These issues will be resolved when the local anesthetic wears off   You may experience numbness and tingling in your thigh on the same side as your surgery if the block medicine was injected at  "your groin area  Numbness will make it difficult to walk  You may have problems with balance and walking so be very careful   Follow your surgeon's direction regarding weight bearing on your surgical limb  Be very careful with your numb limb  Precautions  The numbness may affect your balance  Be careful when walking or moving around  Your leg may be weak: be very careful putting weight on it  If your surgeon did not specify a time, you should not bear weight for 24 hours  Be sure to ask for help when you need it  It is better to have help than to fall and hurt yourself  Prevent Injury  Protect the limb like a baby  Beware of exposing your limb to extreme heat or cold or trauma  The limb may be injured without you noticing because it is numb  Keep the limb elevated whenever possible  Do not sleep on the limb  Change the position of the limb regularly  Avoid putting pressure on your surgical limb  Pain Control  The initial block on the day of surgery will make your extremity feel \"numb\"  Any consecutive injection including prior to discharge from the hospital will make your extremity feel \"numb\"  You may feel an aching or burning when the local anesthesia starts to wear off  Take pain pills as prescribed by your surgeon  Call your surgeon or anesthesiologist if you do not have adequate pain control      "

## 2023-10-18 NOTE — OR NURSING
0658 Patient allergies and NPO status verified, home medication reconciliation completed and belongings secured. Surgical site verified with patient. Patient verbalizes understanding of pain scale, expected course of stay and plan of care; patient and family state verbal understanding at this time. IV access established. Sequential in place as ordered.     Pt reports experiencing post nasal drip from allergies. No fever/cough/illness.

## 2023-10-18 NOTE — ANESTHESIA TIME REPORT
Anesthesia Start and Stop Event Times     Date Time Event    10/18/2023 0649 Ready for Procedure     0708 Anesthesia Start     0842 Anesthesia Stop        Responsible Staff  10/18/23    Name Role Begin End    Kevin Armstrong M.D. Anesth 0708 0842        Overtime Reason:  no overtime (within assigned shift)    Comments:

## 2023-10-18 NOTE — OR NURSING
0839: To PACU post left total knee arthroplasty w/ block. OPA in place. Bounding DP pulse observed on operative extremity. Ice applied.    0850: OPA dc'd, breathing is spontaneous and unlabored.    0905: Medicated for pain of 7-8/10.    0920: Pt states no change in pain level.    0931: Report given to SERGE Redd RN.    0952: Report rec'd, care resumed.    0954: Room air saturation down to 82% while sleeping. 02 restarted at 2L via N/C.    1006: Hydralazine given for HTN.    1045: Again, trialed on room air, continues to drop room air saturation to low 80s while sleeping.  updated on plan to have pt go to room.    1115: Report called. Awaiting transport.

## 2023-10-18 NOTE — ANESTHESIA PREPROCEDURE EVALUATION
Case: [id number] Date/Time: 10/18/23 0645    Procedure: LEFT TOTAL KNEE ARTHROPLASTY (Left)    Diagnosis: Degenerative arthritis of left knee [M17.12]    Pre-op diagnosis: Degenerative arthritis of left knee [M17.12]    Location:  OR  / SURGERY [facility]    Surgeons: Jarrell Harris M.D.          Relevant Problems   CARDIAC   (positive) Essential hypertension   (positive) Paroxysmal atrial fibrillation (HCC)      GI   (positive) Gastroesophageal reflux disease without esophagitis      Other   (positive) Arthritis of right knee       Physical Exam    Airway   Mallampati: II  TM distance: >3 FB  Neck ROM: full       Cardiovascular - normal exam  Rhythm: regular  Rate: normal  (-) murmur     Dental - normal exam           Pulmonary - normal exam  Breath sounds clear to auscultation     Abdominal    Neurological - normal exam                 Anesthesia Plan    ASA 3 (chart)       Plan - general and peripheral nerve block     Peripheral nerve block will be post-op pain control  Airway plan will be LMA          Induction: intravenous    Postoperative Plan: Postoperative administration of opioids is intended.    Pertinent diagnostic labs and testing reviewed    Informed Consent:    Anesthetic plan and risks discussed with patient.    Use of blood products discussed with: patient whom consented to blood products.

## 2023-12-05 ENCOUNTER — OFFICE VISIT (OUTPATIENT)
Dept: SLEEP MEDICINE | Facility: MEDICAL CENTER | Age: 68
End: 2023-12-05
Attending: NURSE PRACTITIONER
Payer: COMMERCIAL

## 2023-12-05 VITALS
OXYGEN SATURATION: 93 % | BODY MASS INDEX: 40.49 KG/M2 | RESPIRATION RATE: 16 BRPM | HEART RATE: 71 BPM | WEIGHT: 273.4 LBS | HEIGHT: 69 IN | SYSTOLIC BLOOD PRESSURE: 160 MMHG | DIASTOLIC BLOOD PRESSURE: 90 MMHG

## 2023-12-05 DIAGNOSIS — J30.9 ALLERGIC RHINITIS: ICD-10-CM

## 2023-12-05 DIAGNOSIS — J45.40 MODERATE PERSISTENT ASTHMA WITHOUT COMPLICATION: ICD-10-CM

## 2023-12-05 PROCEDURE — 3077F SYST BP >= 140 MM HG: CPT | Performed by: NURSE PRACTITIONER

## 2023-12-05 PROCEDURE — 99213 OFFICE O/P EST LOW 20 MIN: CPT | Performed by: NURSE PRACTITIONER

## 2023-12-05 PROCEDURE — 3080F DIAST BP >= 90 MM HG: CPT | Performed by: NURSE PRACTITIONER

## 2023-12-05 PROCEDURE — 99212 OFFICE O/P EST SF 10 MIN: CPT | Performed by: NURSE PRACTITIONER

## 2023-12-05 RX ORDER — MONTELUKAST SODIUM 10 MG/1
TABLET ORAL
Qty: 90 TABLET | Refills: 3 | Status: SHIPPED | OUTPATIENT
Start: 2023-12-05 | End: 2024-03-05 | Stop reason: SDUPTHER

## 2023-12-05 ASSESSMENT — FIBROSIS 4 INDEX: FIB4 SCORE: 1.23

## 2023-12-05 NOTE — PROGRESS NOTES
No chief complaint on file.      HPI:  Sara Knight is a 67 y.o. year old female here today for follow-up on asthma.  Last seen by me on 5/2/2023.  Past medical history includes GERD, A. fib on anticoagulation, osteoarthritis, and asthma.  She is a lifelong non-smoker. Typical triggers for her asthma include hairspray, perfume, pollen, acid reflux, and wildfire smoke.  She reports symptoms being well controlled today on Symbicort 80, montelukast and proair.      Currently asymptomatic.  Denies cough, wheezing, chest tightness, or new or worsening dyspnea or shortness of breath.  Had knee replaced a few months ago left total knee.  Denies any complications associated with surgery.  Denies any exacerbations or hospital visits since last visit.    ROS: As per HPI and otherwise negative if not stated.    Past Medical History:   Diagnosis Date    Adverse effect of anesthesia 2017    Bradycardiac with gallbladder surgery    Anesthesia     PONV    Aortic valve sclerosis     MILD-without arotic stenosis per echo report 7/6/22    Arrhythmia     Afib- Follows with Dr FABI Navarro @ [facility]    Arthritis     osteo    Asthma     daily inhalor    Gavin esophagus     taking omeprazole    Diabetes (HCC)     Pre-Diabetes-does not check glucose at home    GERD (gastroesophageal reflux disease)     Heart burn     taking omeprazole    High cholesterol     Hypertension     Indigestion     taking omeprazole    Left knee pain 09/20/2023    Osteoarthritis     PONV (postoperative nausea and vomiting)     with codiene and opiates    Right knee pain 03/07/2023    Seasonal allergies        Past Surgical History:   Procedure Laterality Date    PB TOTAL KNEE ARTHROPLASTY Left 10/18/2023    Procedure: LEFT TOTAL KNEE ARTHROPLASTY - [facility];  Surgeon: Jarrell Harris M.D.;  Location: SURGERY [facility];  Service: Ortho Robotic    PB TOTAL KNEE ARTHROPLASTY  5/10/2023    Procedure: RIGHT TOTAL KNEE ARTHROPLASTY;  Surgeon: Jarrell GUADARRAMA  NERI Harris;  Location: [facility];  Service: Orthopedics    PB KNEE SCOPE,MED/LAT MENISECTOMY Right 08/02/2022    Procedure: RIGH KNEE ARTHROSCOPY MEDIAL MENISCECTOMY, REPAIRS AS INDICATED;  Surgeon: Jarrell Harris M.D.;  Location: [facility];  Service: Orthopedics    LAP, REMOVE ADJUST NATIVIDAD RESTRICT D* N/A 04/21/2022    Procedure: REMOVAL, GASTRIC BAND AND PORT, LAPAROSCOPIC;  Surgeon: Driss Louis M.D.;  Location: SURGERY [facility];  Service: General    WI UPPER GI ENDOSCOPY,DIAGNOSIS  06/03/2021    Procedure: GASTROSCOPY;  Surgeon: Jaime Starr M.D.;  Location: SURGERY [facility];  Service: EUS    EGD W/ENDOSCOPIC ULTRASOUND  06/03/2021    Procedure: EGD, WITH ENDOSCOPIC US;  Surgeon: Jaime Starr M.D.;  Location: SURGERY [facility];  Service: EUS    EGD WITH ASP/BX  06/03/2021    Procedure: EGD, WITH ASPIRATION BIOPSY;  Surgeon: Jaime Strar M.D.;  Location: SURGERY [facility];  Service: EUS    SHOULDER ARTHROSCOPY Left 2019    SHOULDER ARTHROSCOPY Right 2018    CHOLECYSTECTOMY  2017    GASTRIC BANDING LAPAROSCOPIC  2008    OTHER ORTHOPEDIC SURGERY Right 1998    tarsal fusion     OTHER ORTHOPEDIC SURGERY Right     foot surgery    TONSILLECTOMY      as a child       Family History   Problem Relation Age of Onset    Lung Disease Mother     Hypertension Mother     Lung Disease Father     Cancer Father     Hypertension Father        Allergies as of 12/05/2023 - Reviewed 12/01/2023   Allergen Reaction Noted    Soap Shortness of Breath 04/21/2022    Clindamycin Rash and Vomiting 05/17/2023    Codeine Itching, Nausea, Unspecified, and Vomiting 04/18/2022    Hydrocodone Vomiting and Nausea 03/02/2021    Oxycodone Itching and Nausea 01/22/2019        Vitals:  There were no vitals taken for this visit.    Current medications as of today   Current Outpatient Medications   Medication Sig Dispense Refill    ondansetron (ZOFRAN ODT) 8 MG TABLET DISPERSIBLE  DISSOLVE 1 TABLET ON THE TONGUE EVERY DAY AS NEEDED      tamsulosin (FLOMAX) 0.4 MG capsule TAKE 1 CAPSULE BY MOUTH EVERY DAY ( MAIL ORDER ONLY )      lidocaine (ASPERFLEX) 4 % Patch Place 1 Patch on the skin 1 time a day as needed (As directed on package). 10 Patch 0    gabapentin (NEURONTIN) 100 MG Cap 1 capsule PO BID. May slowly increase to a max of 900mg PO BID 90 Capsule 2    montelukast (SINGULAIR) 10 MG Tab TAKE 1 TABLET BY MOUTH IN THE EVENING 90 Tablet 3    Azelastine (ASTELIN) 137 MCG/SPRAY Solution Administer 2 Sprays into affected nostril(S) 2 times a day.      WEGOVY 1.7 MG/0.75ML Solution Auto-injector Pen-injector INJECT 1.7 MG UNDER THE SKIN ONCE WEEKLY      amoxicillin (AMOXIL) 500 MG Cap Take 4 caps 1 hour prior to the procedure. (Patient taking differently: Take 2,000 mg by mouth one time as needed. Take 4 caps 1 hour prior to DENTAL WORK) 4 Capsule 4    Cholecalciferol (VITAMIN D3) 125 MCG (5000 UT) Cap Take 5,000 Units by mouth.      losartan (COZAAR) 50 MG Tab Take 50 mg by mouth 2 times a day.      rivaroxaban (XARELTO) 20 MG Tab tablet 1 tablet with food Orally Once a day      budesonide-formoterol (SYMBICORT) 160-4.5 MCG/ACT Aerosol Symbicort 160 mcg-4.5 mcg/actuation HFA aerosol inhaler (Patient taking differently: Inhale 1 Puff every day. Symbicort 160 mcg-4.5 mcg/actuation HFA aerosol inhaler) 1 Each 5    acetaminophen (TYLENOL) 650 MG CR tablet Take 1,300 mg by mouth every day.      traZODone (DESYREL) 100 MG Tab Take  mg by mouth at bedtime as needed.      famotidine (PEPCID) 20 MG Tab Take 20 mg by mouth every evening.      omeprazole (PRILOSEC) 20 MG delayed-release capsule Take 20 mg by mouth every morning.      zinc gluconate 50 MG Tab tablet Take 50 mg by mouth. TWICE PER WEEK      atorvastatin (LIPITOR) 20 MG Tab Take 20 mg by mouth every day.      metoprolol SR (TOPROL XL) 25 MG TABLET SR 24 HR Take 12.5 mg by mouth every evening.      albuterol (PROAIR HFA) 108 (90 Base)  MCG/ACT Aero Soln inhalation aerosol Inhale 2 Puffs every 6 hours as needed for Shortness of Breath. 25.5 g 4    Coenzyme Q10 (CO Q-10) 200 MG Cap Take 200 mg by mouth every day.      Cyanocobalamin (VITAMIN B-12 PO) Take 1 Tablet by mouth every day.      Magnesium 400 MG Cap Take 1 capsule by mouth every day.       No current facility-administered medications for this visit.         Physical Exam:   Gen:           Alert and oriented, No apparent distress. Mood and affect appropriate, normal interaction with examiner.  Eyes:          PERRL, EOM intact, sclere white, conjunctive moist.  Ears:          Not examined.   Hearing:     Grossly intact.  Nose:          Normal, no lesions or deformities.  Dentition:    Good dentition.  Oropharynx:   Tongue normal, posterior pharynx without erythema or exudate.  Neck:        Supple, trachea midline, no masses.  Respiratory Effort: No intercostal retractions or use of accessory muscles.   Lung Auscultation:      Clear to auscultation bilaterally; no rales, rhonchi or wheezing.  CV:            Regular rate and rhythm. No murmurs, rubs or gallops.  Abd:           Not examined.   Lymphadenopathy: Not examined.  Gait and Station: Normal.  Digits and Nails: No clubbing, cyanosis, petechiae, or nodes.   Cranial Nerves: II-XII grossly intact.  Skin:        No rashes, lesions or ulcers noted.               Ext:           No cyanosis or edema.      Assessment:  1. Moderate persistent asthma without complication            Plan:  Currently asymptomatic.  Denies any new or worsening dyspnea or shortness of breath.  Continue Symbicort 80 mcg.  Albuterol as needed.  Follow-up in 6 months, but can be seen sooner if needed.    Please note that this dictation was created using voice recognition software. I have made every reasonable attempt to correct obvious errors, but it is possible there are errors of grammar and possibly content that I did not discover before finalizing the note.

## 2023-12-19 ENCOUNTER — TELEPHONE (OUTPATIENT)
Dept: SLEEP MEDICINE | Facility: MEDICAL CENTER | Age: 68
End: 2023-12-19
Payer: COMMERCIAL

## 2023-12-19 ENCOUNTER — APPOINTMENT (RX ONLY)
Dept: URBAN - METROPOLITAN AREA CLINIC 6 | Facility: CLINIC | Age: 68
Setting detail: DERMATOLOGY
End: 2023-12-19

## 2023-12-19 DIAGNOSIS — L82.0 INFLAMED SEBORRHEIC KERATOSIS: ICD-10-CM

## 2023-12-19 DIAGNOSIS — L82.1 OTHER SEBORRHEIC KERATOSIS: ICD-10-CM

## 2023-12-19 DIAGNOSIS — Z85.828 PERSONAL HISTORY OF OTHER MALIGNANT NEOPLASM OF SKIN: ICD-10-CM

## 2023-12-19 PROBLEM — D48.5 NEOPLASM OF UNCERTAIN BEHAVIOR OF SKIN: Status: ACTIVE | Noted: 2023-12-19

## 2023-12-19 PROCEDURE — 99212 OFFICE O/P EST SF 10 MIN: CPT | Mod: 25

## 2023-12-19 PROCEDURE — ? COUNSELING

## 2023-12-19 PROCEDURE — 17110 DESTRUCTION B9 LES UP TO 14: CPT

## 2023-12-19 PROCEDURE — ? LIQUID NITROGEN

## 2023-12-19 ASSESSMENT — LOCATION DETAILED DESCRIPTION DERM
LOCATION DETAILED: RIGHT INFERIOR CENTRAL MALAR CHEEK
LOCATION DETAILED: RIGHT LATERAL PROXIMAL CALF
LOCATION DETAILED: RIGHT DISTAL CALF

## 2023-12-19 ASSESSMENT — LOCATION ZONE DERM
LOCATION ZONE: FACE
LOCATION ZONE: LEG

## 2023-12-19 ASSESSMENT — LOCATION SIMPLE DESCRIPTION DERM
LOCATION SIMPLE: RIGHT CALF
LOCATION SIMPLE: RIGHT LOWER LEG
LOCATION SIMPLE: RIGHT CHEEK

## 2023-12-19 NOTE — PROCEDURE: LIQUID NITROGEN
Consent: The patient's consent was obtained including but not limited to risks of crusting, scabbing, blistering, scarring, darker or lighter pigmentary change, recurrence, incomplete removal and infection.
Render Note In Bullet Format When Appropriate: No
Medical Necessity Information: It is in your best interest to select a reason for this procedure from the list below. All of these items fulfill various CMS LCD requirements except the new and changing color options.
Spray Paint Text: The liquid nitrogen was applied to the skin utilizing a spray paint frosting technique.
Show Applicator Variable?: Yes
Number Of Freeze-Thaw Cycles: 3 freeze-thaw cycles
Medical Necessity Clause: This procedure was medically necessary because the lesions that were treated were:
Post-Care Instructions: I reviewed with the patient in detail post-care instructions. Patient is to wear sunprotection, and avoid picking at any of the treated lesions. Pt may apply Vaseline to crusted or scabbing areas.
Detail Level: Detailed

## 2023-12-19 NOTE — TELEPHONE ENCOUNTER
Patient called stating she is currently taking Symbicort and it is causing her high BP. Patient is wondering if she can get on a preventative inhaler for her asthma that does not cause high blood pressure. Please Advise.

## 2024-01-15 PROBLEM — M24.662 ARTHROFIBROSIS OF KNEE JOINT, LEFT: Status: ACTIVE | Noted: 2024-01-15

## 2024-03-05 ENCOUNTER — OFFICE VISIT (OUTPATIENT)
Dept: SLEEP MEDICINE | Facility: MEDICAL CENTER | Age: 69
End: 2024-03-05
Attending: NURSE PRACTITIONER
Payer: COMMERCIAL

## 2024-03-05 VITALS
HEIGHT: 69 IN | DIASTOLIC BLOOD PRESSURE: 76 MMHG | BODY MASS INDEX: 39.1 KG/M2 | RESPIRATION RATE: 16 BRPM | WEIGHT: 264 LBS | SYSTOLIC BLOOD PRESSURE: 130 MMHG

## 2024-03-05 DIAGNOSIS — G47.30 SLEEP-DISORDERED BREATHING: ICD-10-CM

## 2024-03-05 DIAGNOSIS — J30.9 ALLERGIC RHINITIS: ICD-10-CM

## 2024-03-05 DIAGNOSIS — J45.40 MODERATE PERSISTENT ASTHMA WITHOUT COMPLICATION: ICD-10-CM

## 2024-03-05 DIAGNOSIS — Z78.9 NONSMOKER: ICD-10-CM

## 2024-03-05 DIAGNOSIS — J45.30 MILD PERSISTENT ASTHMA WITHOUT COMPLICATION: ICD-10-CM

## 2024-03-05 DIAGNOSIS — K22.719 BARRETT'S ESOPHAGUS WITH DYSPLASIA: ICD-10-CM

## 2024-03-05 PROCEDURE — 99213 OFFICE O/P EST LOW 20 MIN: CPT | Performed by: NURSE PRACTITIONER

## 2024-03-05 PROCEDURE — 3075F SYST BP GE 130 - 139MM HG: CPT | Performed by: NURSE PRACTITIONER

## 2024-03-05 PROCEDURE — 99214 OFFICE O/P EST MOD 30 MIN: CPT | Performed by: NURSE PRACTITIONER

## 2024-03-05 PROCEDURE — 3078F DIAST BP <80 MM HG: CPT | Performed by: NURSE PRACTITIONER

## 2024-03-05 RX ORDER — MONTELUKAST SODIUM 10 MG/1
TABLET ORAL
Qty: 90 TABLET | Refills: 3 | Status: SHIPPED | OUTPATIENT
Start: 2024-03-05

## 2024-03-05 RX ORDER — BUDESONIDE AND FORMOTEROL FUMARATE DIHYDRATE 160; 4.5 UG/1; UG/1
AEROSOL RESPIRATORY (INHALATION)
Qty: 1 EACH | Refills: 5 | Status: SHIPPED | OUTPATIENT
Start: 2024-03-05

## 2024-03-05 RX ORDER — BEMPEDOIC ACID AND EZETIMIBE 180; 10 MG/1; MG/1
TABLET, FILM COATED ORAL
COMMUNITY

## 2024-03-05 ASSESSMENT — PATIENT HEALTH QUESTIONNAIRE - PHQ9: CLINICAL INTERPRETATION OF PHQ2 SCORE: 0

## 2024-03-05 ASSESSMENT — FIBROSIS 4 INDEX: FIB4 SCORE: 1.25

## 2024-03-05 NOTE — PROGRESS NOTES
Chief Complaint   Patient presents with    Follow-Up     Asthma // Last Seen 12/5/2023        HPI:  Sara Knight is a 68 y.o. year old female here today for follow-up on mild persistent asthma.  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 Fib on Xarelto.  Last OV 12/5/23 Maxime Cedillo APRN     [facility] stGstrstastdstest:st st1st Exacerbations this year: 1? January sinus infection vs bronchitis?    Currently prescribed Symbicort 80/4.5mcg 2 puffs BID, singulair qhs and albuterol hfa prn.    Interval Events:  She notes in the last 2 months having increased shortness of breath emergency room a sinus infection that may have turned into bronchitis with head congestion and cough.  She was treated with doxycycline x 7 days and minimal improvement in symptoms.  She returned to GP and placed on cefdinir x 10 days which she finished 3 days ago with some improvement in symptoms but shortness of breath continues.  She has noted occasional wheezing.  She saw cardiology in follow-up and is pending a stress test and recent Holter monitor testing.  She also notes being anemic with recent lab work at [facility] that I do not have to review.  She uses ProAir prior to Symbicort use but has not required further use of it than that.  She notes due to increased shortness of breath she is also had episodes of anxiety and actually woke at 1 AM with palpitations, nausea and sweats and cold REMSA who noted normal EKG and because symptoms improved she was not transported to hospital.  She was recently seen by GI doctor in [facility] and endoscopy performed for Gavin's which noted improvement.  Due to increased palpitations in last 2 months her metoprolol dosing was increased to 25 mg.  She has never had overnight oximetry or sleep apnea testing performed and her  states she does not snore.  She does note having regular morning headaches but no regular napping.  Her A1c did increase so she was placed on Mounjaro and is received 3 injections so far.   Due to 2 knee replacements she has not been as active in the last year and notes being deconditioned.  She is avoiding caffeine and alcohol.  She also tried stopping Nexlizet x 3 weeks to see if her shortness of breath would improve but it did not so she resumed therapy.  Is concerned over ongoing shortness of breath with no clear etiology.    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.  Multiple environmental occupational exposures with history of first asthma attack in 1990s while at work.  Managed well with inhaled corticosteroids and long-acting beta agonist.  Both of her parents suffered from lung cancer but heavy tobacco users.  Father had colon cancer.  Spirometry 8/5/2021 notes FEV1 1.88 L 70% ratio 70%.  Mild obstruction noted.  Chest x-ray 7/21/2022 noted clear lungs, normal heart size and degenerative changes in thoracic spine.    ROS: As per HPI and otherwise negative if not stated.    Past Medical History:   Diagnosis Date    Adverse effect of anesthesia 2017    Bradycardiac with gallbladder surgery    Anesthesia     PONV    Aortic valve sclerosis     MILD-without arotic stenosis per echo report 7/6/22    Arrhythmia     Afib- Follows with Dr FABI Navarro @ [facility]    Arthritis     osteo    Asthma     daily inhalor    Gavin esophagus     taking omeprazole    Diabetes (HCC)     Pre-Diabetes-does not check glucose at home    GERD (gastroesophageal reflux disease)     Heart burn     taking omeprazole    High cholesterol     Hypertension     Indigestion     taking omeprazole    Left knee pain 09/20/2023    Osteoarthritis     PONV (postoperative nausea and vomiting)     with codiene and opiates    Right knee pain 03/07/2023    Seasonal allergies        Past Surgical History:   Procedure Laterality Date    PB MANIPULATN KNEE JT+ANESTHESIA Left 1/23/2024    Procedure: LEFT KNEE MANIPULATION;  Surgeon: Jarrell Harris M.D.;  Location:  [facility] [place];  Service: Orthopedics    PB TOTAL KNEE ARTHROPLASTY Left 10/18/2023    Procedure: LEFT TOTAL KNEE ARTHROPLASTY - KYLEE;  Surgeon: Jarrell Harris M.D.;  Location: [facility];  Service: Ortho Robotic    PB TOTAL KNEE ARTHROPLASTY  5/10/2023    Procedure: RIGHT TOTAL KNEE ARTHROPLASTY;  Surgeon: Jarrell Harris M.D.;  Location: [facility];  Service: Orthopedics    PB KNEE SCOPE,MED/LAT MENISECTOMY Right 08/02/2022    Procedure: RIGH KNEE ARTHROSCOPY MEDIAL MENISCECTOMY, REPAIRS AS INDICATED;  Surgeon: Jarrell Harris M.D.;  Location: [facility];  Service: Orthopedics    LAP, REMOVE ADJUST NATIVIDAD RESTRICT D* N/A 04/21/2022    Procedure: REMOVAL, GASTRIC BAND AND PORT, LAPAROSCOPIC;  Surgeon: Driss Louis M.D.;  Location: [facility];  Service: General    VA UPPER GI ENDOSCOPY,DIAGNOSIS  06/03/2021    Procedure: GASTROSCOPY;  Surgeon: Jaime Starr M.D.;  Location: [facility];  Service: EUS    EGD W/ENDOSCOPIC ULTRASOUND  06/03/2021    Procedure: EGD, WITH ENDOSCOPIC US;  Surgeon: Jaime Starr M.D.;  Location: [facility];  Service: EUS    EGD WITH ASP/BX  06/03/2021    Procedure: EGD, WITH ASPIRATION BIOPSY;  Surgeon: Jaime Starr M.D.;  Location: [facility];  Service: EUS    SHOULDER ARTHROSCOPY Left 2019    SHOULDER ARTHROSCOPY Right 2018    CHOLECYSTECTOMY  2017    GASTRIC BANDING LAPAROSCOPIC  2008    OTHER ORTHOPEDIC SURGERY Right 1998    tarsal fusion     OTHER ORTHOPEDIC SURGERY Right     foot surgery    TONSILLECTOMY      as a child       Family History   Problem Relation Age of Onset    Lung Disease Mother     Hypertension Mother     Lung Disease Father     Cancer Father     Hypertension Father        Social History     Socioeconomic History    Marital status:      Spouse name: Not on file    Number of children: Not on file    Years of education: Not on file    Highest  "education level: Not on file   Occupational History    Not on file   Tobacco Use    Smoking status: Never    Smokeless tobacco: Never   Vaping Use    Vaping Use: Never used   Substance and Sexual Activity    Alcohol use: Not Currently    Drug use: Never    Sexual activity: Not on file   Other Topics Concern    Not on file   Social History Narrative    Not on file     Social Determinants of Health     Financial Resource Strain: Not on file   Food Insecurity: Not on file   Transportation Needs: Not on file   Physical Activity: Not on file   Stress: Not on file   Social Connections: Not on file   Intimate Partner Violence: Not on file   Housing Stability: Not on file       Allergies as of 03/05/2024 - Reviewed 03/05/2024   Allergen Reaction Noted    Soap Shortness of Breath 04/21/2022    Clindamycin Rash and Vomiting 05/17/2023    Codeine Itching, Nausea, Unspecified, and Vomiting 04/18/2022    Hydrocodone Vomiting and Nausea 03/02/2021    Oxycodone Itching and Nausea 01/22/2019        Vitals:  /76 (BP Location: Left arm, Patient Position: Sitting, BP Cuff Size: Adult)   Resp 16   Ht 1.753 m (5' 9\")   Wt 120 kg (264 lb)     Current medications as of today   Current Outpatient Medications   Medication Sig Dispense Refill    Bempedoic Acid-Ezetimibe (NEXLIZET) 180-10 MG Tab Take  by mouth.      montelukast (SINGULAIR) 10 MG Tab TAKE 1 TABLET BY MOUTH IN THE EVENING 90 Tablet 3    lidocaine (ASPERFLEX) 4 % Patch Place 1 Patch on the skin 1 time a day as needed (As directed on package). 10 Patch 0    Azelastine (ASTELIN) 137 MCG/SPRAY Solution Administer 2 Sprays into affected nostril(S) 2 times a day.      Cholecalciferol (VITAMIN D3) 125 MCG (5000 UT) Cap Take 5,000 Units by mouth.      losartan (COZAAR) 50 MG Tab Take 50 mg by mouth 2 times a day.      rivaroxaban (XARELTO) 20 MG Tab tablet 1 tablet with food Orally Once a day      budesonide-formoterol (SYMBICORT) 160-4.5 MCG/ACT Aerosol Symbicort 160 mcg-4.5 " mcg/actuation HFA aerosol inhaler (Patient taking differently: Inhale 1 Puff every day. Symbicort 160 mcg-4.5 mcg/actuation HFA aerosol inhaler) 1 Each 5    acetaminophen (TYLENOL) 650 MG CR tablet Take 1,300 mg by mouth every day.      traZODone (DESYREL) 100 MG Tab Take  mg by mouth at bedtime as needed.      famotidine (PEPCID) 20 MG Tab Take 20 mg by mouth every evening.      omeprazole (PRILOSEC) 20 MG delayed-release capsule Take 20 mg by mouth every morning.      zinc gluconate 50 MG Tab tablet Take 50 mg by mouth. TWICE PER WEEK      metoprolol SR (TOPROL XL) 25 MG TABLET SR 24 HR Take 12.5 mg by mouth 2 times a day.      albuterol (PROAIR HFA) 108 (90 Base) MCG/ACT Aero Soln inhalation aerosol Inhale 2 Puffs every 6 hours as needed for Shortness of Breath. 25.5 g 4    Coenzyme Q10 (CO Q-10) 200 MG Cap Take 200 mg by mouth every day.      Cyanocobalamin (VITAMIN B-12 PO) Take 1 Tablet by mouth every day.      Magnesium 400 MG Cap Take 1 capsule by mouth every day.       No current facility-administered medications for this visit.         Physical Exam:   Gen:           Alert and oriented, No apparent distress. Mood and affect appropriate, normal interaction with examiner.  Eyes:          PERRL, EOM intact, sclere white, conjunctive moist.  Ears:          Not examined.   Hearing:     Grossly intact.  Nose:          Normal, no lesions or deformities.  Dentition:    Not examined.   Oropharynx:   Not examined.   Mallampati Classification: Not examined.   Neck:        Supple, trachea midline, no masses.  Respiratory Effort: No intercostal retractions or use of accessory muscles.   Lung Auscultation:      Clear to auscultation bilaterally; no rales, rhonchi or wheezing.  CV:            Regular rate and rhythm. No murmurs, rubs or gallops.  Abd:           Not examined.   Lymphadenopathy: Not examined.   Gait and Station: Normal.  Digits and Nails: No clubbing, cyanosis, petechiae, or nodes.   Cranial  Nerves: II-XII grossly intact.  Skin:        No rashes, lesions or ulcers noted.               Ext:           No cyanosis or edema.      Assessment:  1. Mild persistent asthma without complication  PULMONARY FUNCTION TESTS -Test requested: Complete Pulmonary Function Test; Include MIPS/MEPS? No      2. Sleep-disordered breathing  Overnight Home Sleep Study      3. Gavin's esophagus with dysplasia        4. BMI 38.0-38.9,adult  HEIGHT AND WEIGHT      5. Nonsmoker  PULMONARY FUNCTION TESTS -Test requested: Complete Pulmonary Function Test; Include MIPS/MEPS? No      6. Moderate persistent asthma without complication  budesonide-formoterol (SYMBICORT) 160-4.5 MCG/ACT Aerosol      7. Allergic rhinitis  montelukast (SINGULAIR) 10 MG Tab                 Immunizations:    Flu:recommend  Pneumovax 23:2021  Prevnar 13:2021  PCV 20: not due  COVID-19: 9/27/23    Plan:  Asthma is clinically stable but will icnrease dosing of symbicort to 160mcg in the interim. Will update PFT now.   Refills provided   PFT at next OV  Recommend HST now to r/o sleep apnea due to recent palpitation/A. Fib episode during the middle of the night and general increased SOB.   HST now  Gavin's seems to be well controlled and improved per patient report from GI and symptoms.  Encourage weight loss through diet and exercise.  F/u with PCP for other health concerns and cardio for ongoing testing. Consider ECHO if stress test shows no abnormalities.  F/u in 6 weeks to review PFT and HST results, sooner if needed.    Please note that this dictation was created using voice recognition software. I have made every reasonable attempt to correct obvious errors, but it is possible there are errors of grammar and possibly content that I did not discover before finalizing the note.

## 2024-03-05 NOTE — PATIENT INSTRUCTIONS
Complete home sleep study    Step up to symbicort 160mcg 2 puffs twice daily and continue proair hfa as needed. Continue singulair at night.

## 2024-03-05 NOTE — LETTER
LAUREN Parrish  [facility] Pulmonary Medicine - Operated by [facility]   [address], [address] [address]  Phone: [phone] - Fax: [phone]           Encounter Date: 3/5/2024  Provider: LAUREN Parrish  Location of Care[place] [facility] [place] [facility]  [address] [facility] [place] [address] [phone]      Patient:   Sara Knight   MR Number: [id number]   YOB: 1955     PROGRESS NOTE:  Chief Complaint   Patient presents with   • Follow-Up     Asthma // Last Seen 12/5/2023        HPI:  Sara Knight is a 68 y.o. year old female here today for follow-up on mild persistent asthma.  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 Fib on Xarelto.  Last OV 12/5/23 Maxime Cedillo APRN     MMRC Grade: ***  Exacerbations this year: ***    Currently prescribed Symbicort 80/4.5mcg 2 puffs BID, singulair qhs and albuterol hfa prn.    Interval Events:  She notes in the last 2 months having increased shortness of breath emergency room a sinus infection that may have turned into bronchitis with head congestion and cough.  She was treated with doxycycline x 7 days and minimal improvement in symptoms.  She returned to GP and placed on cefdinir x 10 days which she finished 3 days ago with some improvement in symptoms but shortness of breath continues.  She has noted occasional wheezing.  She saw cardiology in follow-up and is pending a stress test and recent Holter monitor testing.  She also notes being anemic with recent lab work at [facility] that I do not have to review.  She uses ProAir prior to Symbicort use but has not required further use of it than that.  She notes due to increased shortness of breath she is also had episodes of anxiety and actually woke at 1 AM with palpitations, nausea and sweats and cold REMSA who noted normal EKG and because  symptoms improved she was not transported to hospital.  She was recently seen by GI doctor in [facility] and endoscopy performed for Gavin's which noted improvement.  Due to increased palpitations in last 2 months her metoprolol dosing was increased to 25 mg.  She has never had overnight oximetry or sleep apnea testing performed and her  states she does not snore.  She does note having regular morning headaches but no regular napping.  Her A1c did increase so she was placed on Mounjaro and is received 3 injections so far.  Due to 2 knee replacements she has not been as active in the last year and notes being deconditioned.  She is avoiding caffeine and alcohol.  She also tried stopping Nexlizet x 3 weeks to see if her shortness of breath would improve but it did not so she resumed therapy.  Is concerned over ongoing shortness of breath with no clear etiology.    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  Multiple environmental occupational exposures with history of first asthma attack in 1990s while at work.  Managed well with inhaled corticosteroids and long-acting beta agonist.  Both of her parents suffered from lung cancer but heavy tobacco users.  Father had colon cancer.  Spirometry 8/5/2021 notes FEV1 1.88 L 70% ratio 70%.  Mild obstruction noted.  Chest x-ray 7/21/2022 noted clear lungs, normal heart size and degenerative changes in thoracic spine.    ROS: As per HPI and otherwise negative if not stated.    Past Medical History:   Diagnosis Date   • Adverse effect of anesthesia 2017    Bradycardiac with gallbladder surgery   • Anesthesia     PONV   • Aortic valve sclerosis     MILD-without arotic stenosis per echo report 7/6/22   • Arrhythmia     Afib- Follows with Dr FABI Navarro @ [facility]   • Arthritis     osteo   • Asthma     daily inhalor   • Gavin esophagus     taking omeprazole   • Diabetes (HCC)     Pre-Diabetes-does  not check glucose at home   • GERD (gastroesophageal reflux disease)    • Heart burn     taking omeprazole   • High cholesterol    • Hypertension    • Indigestion     taking omeprazole   • Left knee pain 09/20/2023   • Osteoarthritis    • PONV (postoperative nausea and vomiting)     with codiene and opiates   • Right knee pain 03/07/2023   • Seasonal allergies        Past Surgical History:   Procedure Laterality Date   • PB MANIPULATN KNEE JT+ANESTHESIA Left 1/23/2024    Procedure: LEFT KNEE MANIPULATION;  Surgeon: Jarrell Harris M.D.;  Location: [facility];  Service: Orthopedics   • PB TOTAL KNEE ARTHROPLASTY Left 10/18/2023    Procedure: LEFT TOTAL KNEE ARTHROPLASTY - KYLEE;  Surgeon: Jarrell Harris M.D.;  Location: [facility];  Service: Ortho Robotic   • PB TOTAL KNEE ARTHROPLASTY  5/10/2023    Procedure: RIGHT TOTAL KNEE ARTHROPLASTY;  Surgeon: Jarrell Harris M.D.;  Location: [facility];  Service: Orthopedics   • PB KNEE SCOPE,MED/LAT MENISECTOMY Right 08/02/2022    Procedure: RIGH KNEE ARTHROSCOPY MEDIAL MENISCECTOMY, REPAIRS AS INDICATED;  Surgeon: Jarrell Harris M.D.;  Location: [facility];  Service: Orthopedics   • LAP, REMOVE ADJUST NATIVIDAD RESTRICT D* N/A 04/21/2022    Procedure: REMOVAL, GASTRIC BAND AND PORT, LAPAROSCOPIC;  Surgeon: Driss Louis M.D.;  Location: [facility];  Service: General   • WI UPPER GI ENDOSCOPY,DIAGNOSIS  06/03/2021    Procedure: GASTROSCOPY;  Surgeon: Jaime Starr M.D.;  Location: [facility];  Service: EUS   • EGD W/ENDOSCOPIC ULTRASOUND  06/03/2021    Procedure: EGD, WITH ENDOSCOPIC US;  Surgeon: Jaime Starr M.D.;  Location: [facility];  Service: EUS   • EGD WITH ASP/BX  06/03/2021    Procedure: EGD, WITH ASPIRATION BIOPSY;  Surgeon: Jaime Starr M.D.;  Location: [facility];  Service: EUS   • SHOULDER ARTHROSCOPY Left 2019   • SHOULDER ARTHROSCOPY Right 2018  "  • CHOLECYSTECTOMY  2017   • GASTRIC BANDING LAPAROSCOPIC  2008   • OTHER ORTHOPEDIC SURGERY Right 1998    tarsal fusion    • OTHER ORTHOPEDIC SURGERY Right     foot surgery   • TONSILLECTOMY      as a child       Family History   Problem Relation Age of Onset   • Lung Disease Mother    • Hypertension Mother    • Lung Disease Father    • Cancer Father    • Hypertension Father        Social History     Socioeconomic History   • Marital status:      Spouse name: Not on file   • Number of children: Not on file   • Years of education: Not on file   • Highest education level: Not on file   Occupational History   • Not on file   Tobacco Use   • Smoking status: Never   • Smokeless tobacco: Never   Vaping Use   • Vaping Use: Never used   Substance and Sexual Activity   • Alcohol use: Not Currently   • Drug use: Never   • Sexual activity: Not on file   Other Topics Concern   • Not on file   Social History Narrative   • Not on file     Social Determinants of Health     Financial Resource Strain: Not on file   Food Insecurity: Not on file   Transportation Needs: Not on file   Physical Activity: Not on file   Stress: Not on file   Social Connections: Not on file   Intimate Partner Violence: Not on file   Housing Stability: Not on file       Allergies as of 03/05/2024 - Reviewed 03/05/2024   Allergen Reaction Noted   • Soap Shortness of Breath 04/21/2022   • Clindamycin Rash and Vomiting 05/17/2023   • Codeine Itching, Nausea, Unspecified, and Vomiting 04/18/2022   • Hydrocodone Vomiting and Nausea 03/02/2021   • Oxycodone Itching and Nausea 01/22/2019        Vitals:  /76 (BP Location: Left arm, Patient Position: Sitting, BP Cuff Size: Adult)   Resp 16   Ht 1.753 m (5' 9\")   Wt 120 kg (264 lb)     Current medications as of today   Current Outpatient Medications   Medication Sig Dispense Refill   • Bempedoic Acid-Ezetimibe (NEXLIZET) 180-10 MG Tab Take  by mouth.     • montelukast (SINGULAIR) 10 MG Tab TAKE 1 " TABLET BY MOUTH IN THE EVENING 90 Tablet 3   • lidocaine (ASPERFLEX) 4 % Patch Place 1 Patch on the skin 1 time a day as needed (As directed on package). 10 Patch 0   • Azelastine (ASTELIN) 137 MCG/SPRAY Solution Administer 2 Sprays into affected nostril(S) 2 times a day.     • Cholecalciferol (VITAMIN D3) 125 MCG (5000 UT) Cap Take 5,000 Units by mouth.     • losartan (COZAAR) 50 MG Tab Take 50 mg by mouth 2 times a day.     • rivaroxaban (XARELTO) 20 MG Tab tablet 1 tablet with food Orally Once a day     • budesonide-formoterol (SYMBICORT) 160-4.5 MCG/ACT Aerosol Symbicort 160 mcg-4.5 mcg/actuation HFA aerosol inhaler (Patient taking differently: Inhale 1 Puff every day. Symbicort 160 mcg-4.5 mcg/actuation HFA aerosol inhaler) 1 Each 5   • acetaminophen (TYLENOL) 650 MG CR tablet Take 1,300 mg by mouth every day.     • traZODone (DESYREL) 100 MG Tab Take  mg by mouth at bedtime as needed.     • famotidine (PEPCID) 20 MG Tab Take 20 mg by mouth every evening.     • omeprazole (PRILOSEC) 20 MG delayed-release capsule Take 20 mg by mouth every morning.     • zinc gluconate 50 MG Tab tablet Take 50 mg by mouth. TWICE PER WEEK     • metoprolol SR (TOPROL XL) 25 MG TABLET SR 24 HR Take 12.5 mg by mouth 2 times a day.     • albuterol (PROAIR HFA) 108 (90 Base) MCG/ACT Aero Soln inhalation aerosol Inhale 2 Puffs every 6 hours as needed for Shortness of Breath. 25.5 g 4   • Coenzyme Q10 (CO Q-10) 200 MG Cap Take 200 mg by mouth every day.     • Cyanocobalamin (VITAMIN B-12 PO) Take 1 Tablet by mouth every day.     • Magnesium 400 MG Cap Take 1 capsule by mouth every day.       No current facility-administered medications for this visit.         Physical Exam:   Gen:           Alert and oriented, No apparent distress. Mood and affect appropriate, normal interaction with examiner.  Eyes:          PERRL, EOM intact, sclere white, conjunctive moist.  Ears:          Not examined.   Hearing:     Grossly intact.  Nose:           Normal, no lesions or deformities.  Dentition:    Not examined.   Oropharynx:   Not examined.   Mallampati Classification: Not examined.   Neck:        Supple, trachea midline, no masses.  Respiratory Effort: No intercostal retractions or use of accessory muscles.   Lung Auscultation:      Clear to auscultation bilaterally; no rales, rhonchi or wheezing.  CV:            Regular rate and rhythm. No murmurs, rubs or gallops.  Abd:           Not examined.   Lymphadenopathy: Not examined.   Gait and Station: Normal.  Digits and Nails: No clubbing, cyanosis, petechiae, or nodes.   Cranial Nerves: II-XII grossly intact.  Skin:        No rashes, lesions or ulcers noted.               Ext:           No cyanosis or edema.      Assessment:  1. Mild persistent asthma without complication  PULMONARY FUNCTION TESTS -Test requested: Complete Pulmonary Function Test; Include MIPS/MEPS? No      2. Sleep-disordered breathing  Overnight Home Sleep Study      3. Gavin's esophagus with dysplasia        4. BMI 38.0-38.9,adult  HEIGHT AND WEIGHT      5. Nonsmoker  PULMONARY FUNCTION TESTS -Test requested: Complete Pulmonary Function Test; Include MIPS/MEPS? No      6. Moderate persistent asthma without complication  budesonide-formoterol (SYMBICORT) 160-4.5 MCG/ACT Aerosol      7. Allergic rhinitis  montelukast (SINGULAIR) 10 MG Tab                 Immunizations:    Flu:recommend  Pneumovax 23:2021  Prevnar 13:2021  PCV 20: not due  COVID-19: 9/27/23    Plan:  Asthma is clinically stable but will icnrease dosing of symbicort to 160mcg in the interim. Will update PFT now.   Refills provided   PFT at next OV  Recommend HST now to r/o sleep apnea due to recent palpitation/A. Fib episode during the middle of the night and general increased SOB.   HST now  Gavin's seems to be well controlled and improved per patient report from GI and symptoms.  Encourage weight loss through diet and exercise.  F/u with PCP for other health concerns  and cardio for ongoing testing. Consider ECHO if stress test shows no abnormalities.  F/u in 6 weeks to review PFT and HST results, sooner if needed.    Please note that this dictation was created using voice recognition software. I have made every reasonable attempt to correct obvious errors, but it is possible there are errors of grammar and possibly content that I did not discover before finalizing the note.          Electronically signed by LAUREN Parrish  on 03/05/24    Bhavya Navarro M.D.  [address] [place] [address]  Via Fax: [phone]

## 2024-03-11 ENCOUNTER — TELEPHONE (OUTPATIENT)
Dept: SLEEP MEDICINE | Facility: MEDICAL CENTER | Age: 69
End: 2024-03-11
Payer: COMMERCIAL

## 2024-03-11 NOTE — TELEPHONE ENCOUNTER
[facility] scanned in media for review as requested.

## 2024-03-18 ENCOUNTER — TELEPHONE (OUTPATIENT)
Dept: SLEEP MEDICINE | Facility: MEDICAL CENTER | Age: 69
End: 2024-03-18
Payer: COMMERCIAL

## 2024-03-18 NOTE — TELEPHONE ENCOUNTER
FAX  1. Name: Sara Zita Nelson                 Call Back Number: [phone] (home)       Patient approves a detailed voicemail message: N\A    2.  What is the procedure: colonoscopy    3.  When is the procedure scheduled: 05/03/24    4.  Who is the Surgeon: DHA    5. Has the patient completed any screening tests for the procedure:     6. Has PCP received a written request from Surgeon: yes - scanned    7. Patient scheduled for an appointment for this clearance?:  no    8. Last OV: 03/05/24 with Daniella QUIROZ     9. Next OV:  05/28/24 with Daniella QUIROZ     10. Last CXR:07/21/22    11. Last PFT: 03/20/24    12. Last CT n/a    Current Medications  Current Outpatient Medications   Medication Sig Dispense Refill    Bempedoic Acid-Ezetimibe (NEXLIZET) 180-10 MG Tab Take  by mouth.      budesonide-formoterol (SYMBICORT) 160-4.5 MCG/ACT Aerosol Symbicort 160 mcg-4.5 mcg/actuation HFA aerosol inhaler 1 Each 5    montelukast (SINGULAIR) 10 MG Tab TAKE 1 TABLET BY MOUTH IN THE EVENING 90 Tablet 3    lidocaine (ASPERFLEX) 4 % Patch Place 1 Patch on the skin 1 time a day as needed (As directed on package). 10 Patch 0    Azelastine (ASTELIN) 137 MCG/SPRAY Solution Administer 2 Sprays into affected nostril(S) 2 times a day.      Cholecalciferol (VITAMIN D3) 125 MCG (5000 UT) Cap Take 5,000 Units by mouth.      losartan (COZAAR) 50 MG Tab Take 50 mg by mouth 2 times a day.      rivaroxaban (XARELTO) 20 MG Tab tablet 1 tablet with food Orally Once a day      acetaminophen (TYLENOL) 650 MG CR tablet Take 1,300 mg by mouth every day.      traZODone (DESYREL) 100 MG Tab Take  mg by mouth at bedtime as needed.      famotidine (PEPCID) 20 MG Tab Take 20 mg by mouth every evening.      omeprazole (PRILOSEC) 20 MG delayed-release capsule Take 20 mg by mouth every morning.      zinc gluconate 50 MG Tab tablet Take 50 mg by mouth. TWICE PER WEEK      metoprolol SR (TOPROL XL) 25 MG TABLET SR 24 HR Take 12.5 mg by mouth 2  times a day.      albuterol (PROAIR HFA) 108 (90 Base) MCG/ACT Aero Soln inhalation aerosol Inhale 2 Puffs every 6 hours as needed for Shortness of Breath. 25.5 g 4    Coenzyme Q10 (CO Q-10) 200 MG Cap Take 200 mg by mouth every day.      Cyanocobalamin (VITAMIN B-12 PO) Take 1 Tablet by mouth every day.      Magnesium 400 MG Cap Take 1 capsule by mouth every day.       No current facility-administered medications for this visit.       Please advise.

## 2024-03-20 ENCOUNTER — NON-PROVIDER VISIT (OUTPATIENT)
Dept: SLEEP MEDICINE | Facility: MEDICAL CENTER | Age: 69
End: 2024-03-20
Attending: NURSE PRACTITIONER
Payer: COMMERCIAL

## 2024-03-20 VITALS — BODY MASS INDEX: 42.74 KG/M2 | HEIGHT: 68 IN | WEIGHT: 282 LBS

## 2024-03-20 DIAGNOSIS — Z78.9 NONSMOKER: ICD-10-CM

## 2024-03-20 DIAGNOSIS — J45.30 MILD PERSISTENT ASTHMA WITHOUT COMPLICATION: ICD-10-CM

## 2024-03-20 PROCEDURE — 94726 PLETHYSMOGRAPHY LUNG VOLUMES: CPT | Performed by: NURSE PRACTITIONER

## 2024-03-20 PROCEDURE — 94729 DIFFUSING CAPACITY: CPT | Mod: 26 | Performed by: INTERNAL MEDICINE

## 2024-03-20 PROCEDURE — 94060 EVALUATION OF WHEEZING: CPT | Performed by: NURSE PRACTITIONER

## 2024-03-20 PROCEDURE — 94726 PLETHYSMOGRAPHY LUNG VOLUMES: CPT | Mod: 26 | Performed by: INTERNAL MEDICINE

## 2024-03-20 PROCEDURE — 94729 DIFFUSING CAPACITY: CPT | Performed by: NURSE PRACTITIONER

## 2024-03-20 PROCEDURE — 94060 EVALUATION OF WHEEZING: CPT | Mod: 26 | Performed by: INTERNAL MEDICINE

## 2024-03-20 ASSESSMENT — PULMONARY FUNCTION TESTS
FEV1_LLN: 2.08
FVC_LLN: 2.69
FEV1_PERCENT_PREDICTED: 68
FVC: 2.63
FVC: 2.64
FEV1/FVC_PERCENT_LLN: 65
FEV1/FVC_PERCENT_CHANGE: 10
FEV1: 1.72
FEV1_PERCENT_PREDICTED: 76
FEV1/FVC_PERCENT_PREDICTED: 84
FVC_PREDICTED: 3.22
FEV1/FVC: 72
FEV1_PREDICTED: 2.49
FEV1/FVC_PERCENT_PREDICTED: 94
FEV1/FVC_PERCENT_PREDICTED: 92
FEV1/FVC: 65
FVC_PERCENT_PREDICTED: 81
FEV1/FVC: 65
FEV1/FVC_PERCENT_PREDICTED: 83
FEV1: 1.89
FEV1_PERCENT_CHANGE: 0
FEV1_PERCENT_CHANGE: 7
FEV1/FVC: 71.59
FEV1/FVC_PERCENT_PREDICTED: 77
FVC_PERCENT_PREDICTED: 81
FEV1/FVC_PREDICTED: 78

## 2024-03-20 ASSESSMENT — FIBROSIS 4 INDEX: FIB4 SCORE: 1.25

## 2024-03-20 NOTE — PROCEDURES
Technician: Bere Fraser RRT, CPFT  Good patient effort & cooperation.  The results of this test meet the ATS/ERS standards for acceptability & reproducibility.  Test was performed on the CPM Braxis Body Plethysmograph-Elite DX system.  Predicted equations for Spirometry are GLI-2012, ITS for lung volumes, and GLI-2017 for DLCO.  The DLCO was uncorrected for Hgb.  A bronchodilator of Ventolin HFA -2puffs via spacer administered.  DLCO performed during dilation period. Patient C/O being claustrophobic, but was able to perform the Pleth maneuver without incident.    Interpretation;   Baseline spirometry shows airflow obstruction with an FEV1/FVC ratio 65 and an FEV1 of 1.72 L or 68% predicted.  There is trend toward bronchodilator response but does not meet ATS criteria.  Postbronchodilator FEV1 is 1.89 L or 76% predicted.  Lung volumes are within normal limits.  Diffusion capacity is elevated at 141% predicted.  Lev function testing shows airflow obstruction with trend toward bronchodilator responsiveness and elevated DLCO.  Combination of findings is consistent with stated diagnosis of asthma.

## 2024-04-04 DIAGNOSIS — J45.40 MODERATE PERSISTENT ASTHMA WITHOUT COMPLICATION: ICD-10-CM

## 2024-04-05 RX ORDER — BUDESONIDE AND FORMOTEROL FUMARATE DIHYDRATE 160; 4.5 UG/1; UG/1
AEROSOL RESPIRATORY (INHALATION)
Qty: 10.2 EACH | Refills: 1 | Status: SHIPPED | OUTPATIENT
Start: 2024-04-05

## 2024-04-05 NOTE — TELEPHONE ENCOUNTER
Changes Requested     BREYNA 160-4.5 MCG/ACT Aerosol         Changed from: budesonide-formoterol (SYMBICORT) 160-4.5 MCG/ACT Aerosol    Sig: USE ONE PUFF EVERY 12 HOURS FOR SHORTNESS OF BREATH    Disp: 10.3 Each    Refills: 1    Start: 4/4/2024    Class: Normal    Non-formulary For: Moderate persistent asthma without complication    Last ordered: 1 month ago (3/5/2024) by KAT ParrishRMARYAM    Last refill: 4/4/2024    Rx #: [id number]    Pharmacy comment: Script Clarification:NEED PROPER INSTRUCTION.

## 2024-04-29 ENCOUNTER — HOME STUDY (OUTPATIENT)
Dept: SLEEP MEDICINE | Facility: MEDICAL CENTER | Age: 69
End: 2024-04-29
Attending: NURSE PRACTITIONER
Payer: COMMERCIAL

## 2024-04-29 DIAGNOSIS — G47.30 SLEEP-DISORDERED BREATHING: ICD-10-CM

## 2024-04-29 PROCEDURE — 95800 SLP STDY UNATTENDED: CPT | Performed by: PREVENTIVE MEDICINE

## 2024-05-07 DIAGNOSIS — G47.30 SLEEP-DISORDERED BREATHING: ICD-10-CM

## 2024-05-07 NOTE — PROCEDURES
DIAGNOSTIC HOME SLEEP TEST (HST) REPORT WatchPAT      PATIENT ID:  NAME:  Sara Knight  MRN:               [id number]  YOB: 1955  DATE OF STUDY:04/29/2024       Impression:     This study shows evidence of:      1. Moderate to Severe obstructive sleep apnea with PAT apnea hyponea index (pAHI 4%) of 28 per hour.  PAT respiratory disturbance index (pRDI) was 43.2 per hour. These findings are based on 7 channels recording of PAT signal with sleep staging, heart rate, pulse oximetry, actigraphy, body position, snoring and respiratory movement.     2. Oxygenation O2 Sat. mean O2 sat was 86%,  sudheer was 74%,  and maximum O2 at 95 %. O2 sat was at or  below 88% for 334.2 min of evaluation time. Oxygen Desaturation (4%) Index was elevated at 25.9/hr. REM AHI: NA.  PAT apnea hyponea index (pAHI 3%) of 43 per hour.  AVG HR was 56 BPM.      TECHNICAL DESCRIPTION: Patient underwent home sleep apnea testing with peripheral arterial tone signal (WatchPAT™). This is a Type IV portable monitor and device. Monitoring was done with 7 channels recording of PAT signal with sleep staging, heart rate, pulse oximetry, actigraphy, body position, snoring and respiratory movement. Prior to using the device, the patient received verbal and written instructions for its application and was provided with the help desk phone number for additional telephonic instruction with 24-hour availability of qualified personnel to answer questions.    AHI  vs RDI:  The pRDI is calculated in a very similar way as the pAHI but an additional type of respiratory events named RERA are also counted. RERA  is the abbreviation for respiratory effort related arousal and is essentially a very short arousal of a few seconds that follows partial occlusion of the airway.  The normal range of the RDI score is also less than 5/hour.    General sleep summary: . Total recording time is 8 hours and 22 minutes and approximate sleep time is 7 hours  and 20 minutes. The patient spent 274.5 minutes in the supine position and 165.7 minutes in the nonsupine position.  Supine AHI (3%) 38.    Recommendations:    CPAP titration study     In general patients with sleep apnea are advised to avoid alcohol and sedatives and to not operate a motor vehicle while drowsy. In some cases alternative treatment options may prove effective in resolving sleep apnea in these options include upper airway surgery, the use of a dental orthotic or weight loss and positional therapy. Clinical correlation is required.

## 2024-05-28 ENCOUNTER — OFFICE VISIT (OUTPATIENT)
Dept: SLEEP MEDICINE | Facility: MEDICAL CENTER | Age: 69
End: 2024-05-28
Attending: NURSE PRACTITIONER
Payer: COMMERCIAL

## 2024-05-28 VITALS
OXYGEN SATURATION: 92 % | DIASTOLIC BLOOD PRESSURE: 84 MMHG | BODY MASS INDEX: 41.52 KG/M2 | HEIGHT: 68 IN | RESPIRATION RATE: 14 BRPM | SYSTOLIC BLOOD PRESSURE: 128 MMHG | WEIGHT: 274 LBS | HEART RATE: 81 BPM

## 2024-05-28 DIAGNOSIS — K21.9 GASTROESOPHAGEAL REFLUX DISEASE WITHOUT ESOPHAGITIS: ICD-10-CM

## 2024-05-28 DIAGNOSIS — J45.30 MILD PERSISTENT ASTHMA WITHOUT COMPLICATION: Chronic | ICD-10-CM

## 2024-05-28 DIAGNOSIS — K22.719 BARRETT'S ESOPHAGUS WITH DYSPLASIA: Chronic | ICD-10-CM

## 2024-05-28 DIAGNOSIS — I10 ESSENTIAL HYPERTENSION: ICD-10-CM

## 2024-05-28 DIAGNOSIS — Z78.9 NONSMOKER: ICD-10-CM

## 2024-05-28 DIAGNOSIS — G47.33 OBSTRUCTIVE SLEEP APNEA: ICD-10-CM

## 2024-05-28 PROBLEM — E66.9 OBESITY (BMI 30-39.9): Status: RESOLVED | Noted: 2021-04-20 | Resolved: 2024-05-28

## 2024-05-28 PROCEDURE — 3079F DIAST BP 80-89 MM HG: CPT | Performed by: NURSE PRACTITIONER

## 2024-05-28 PROCEDURE — 99215 OFFICE O/P EST HI 40 MIN: CPT | Performed by: NURSE PRACTITIONER

## 2024-05-28 PROCEDURE — 3074F SYST BP LT 130 MM HG: CPT | Performed by: NURSE PRACTITIONER

## 2024-05-28 RX ORDER — AMLODIPINE BESYLATE 2.5 MG/1
2.5 TABLET ORAL
COMMUNITY
Start: 2024-05-10

## 2024-05-28 RX ORDER — TIRZEPATIDE 5 MG/.5ML
INJECTION, SOLUTION SUBCUTANEOUS
COMMUNITY

## 2024-05-28 ASSESSMENT — FIBROSIS 4 INDEX: FIB4 SCORE: 1.25

## 2024-05-28 NOTE — PATIENT INSTRUCTIONS
Recommendations for dry mouth:  1.  Increase humidity on PAP machine  2.  Biotene or Spry toothpaste/mouthwash/spray  3.  XyliMelt lozenges  4.  Sleep strips mouth tape available on amazon.com  5.  Chin strap from [employer] or request from medical equipment company (call them for this)  6.  Increase fluid intake    Sleep hygiene discussed.  Recommend keeping a set sleep/wake schedule.  Long enough hours of sleep.  Limiting/avoiding naps.  No caffeine afternoon and no heavy meals in the evening.  Recommend daily exercise.    Patient understands the need to use device every night for >4hrs to meet compliance standards for insurance purposes.  Patient understands they may have mask fits within first 30 days of therapy covered by insurance to obtain best fit.  Recommend cleaning mask, water reservoir, and tubing at least once per week with soap and water and wiping down mask with wash cloth or baby wipe once daily to remove oil from face to improve seal. Change filters per  recommendations; every 2-4 weeks.    Patient understands the need to use device every night for >4hrs to meet compliance standards for insurance purposes.  Patient understands they may have mask fits within first 30 days of therapy covered by insurance to obtain best fit.  Recommend cleaning mask, water reservoir, and tubing at least once per week with soap and water and wiping down mask with wash cloth or baby wipe once daily to remove oil from face to improve seal. Change filters per  recommendations; every 2-4 weeks.

## 2024-05-28 NOTE — PROGRESS NOTES
Chief Complaint   Patient presents with    Follow-Up     Dx/Last seen:  Asthma & Apnea 3/5/2024- ELINA QUIROZ     Tests completed: PFT 3/20/2024 & HST 4/29/2024         HPI:  Sara Knight is a 68 y.o. year old female here today for follow-up on mild persistent asthma.  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 Fib on Xarelto.  Last OV 3/5/24     PFT 3/20/2024 moderate obstruction with FEV1 1.72 L 60%, ratio 65, trend towards bronchodilator response, lung volumes normal, DLCO 141% predicted.    HST 4/29/2024 indicates moderate to severe obstructive sleep apnea with pAHI 4% 28/h, pRDI 43.2/h, mean SpO2 of 86%, O2 sudheer 74% and less than 88% for 334.2 minutes of evaluation time.  LILIANA 25.9/h.  Average heart rate 56 bpm.  Reviewed all results with patient.    MMRC stGstrstastdstest:st st1st Exacerbations this year: 1? January 2024 sinus infection vs bronchitis?     Currently prescribed Symbicort 160/4.5mcg 2 puffs BID, singulair qhs and albuterol hfa prn.     Interval events:  5/20/2024: Since last office visit patient underwent sinus surgery 4/19/2024 with balloon angioplasty notes breathing to be quite improved.  She notes a month of healing was necessary.  She is doing sinus rinses and Singulair at night.  Rare use of albuterol HFA inhaler.  She is consistent with her Symbicort 2 puffs twice daily.  She notes having some ongoing environmental allergies and unsure if she needs referral to allergist.  She notes shortness of breath overall is improved.  She did reduce metoprolol down to half tablet and her shortness of breath also improved.  She has not had any atrial fibrillation.  She is also on Slow Fe and vitamin C for anemia.  She notes when recently visiting [place] for 5 days she felt her breathing was excellent.  She is on Mounjaro for weight loss and her A1c has improved.  She does note occasional dry mouth at night and uses azelastine for her sinuses as well.  She is surprised her home sleep study notes  sleep apnea with persistent low oxygen levels.  3/5/2024: Patient noted increased shortness of breath with emergency room visit and sinus infection along with bronchitis.  Treated with antibiotics.  Please note increased anxiety due to her breathing.  She is having increased palpitations at that time.    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.  Multiple environmental occupational exposures with history of first asthma attack in 1990s while at work.  Managed well with inhaled corticosteroids and long-acting beta agonist.  Both of her parents suffered from lung cancer but heavy tobacco users.  Father had colon cancer.  Spirometry 8/5/2021 notes FEV1 1.88 L 70% ratio 70%.  Mild obstruction noted.  Chest x-ray 7/21/2022 noted clear lungs, normal heart size and degenerative changes in thoracic spine.    ROS: As per HPI and otherwise negative if not stated.    Past Medical History:   Diagnosis Date    Adverse effect of anesthesia 2017    Bradycardiac with gallbladder surgery    Anesthesia     PONV    Aortic valve sclerosis     MILD-without arotic stenosis per echo report 7/6/22    Arrhythmia     Afib- Follows with Dr FABI Navarro @ [facility]    Arthritis     osteo    Asthma     daily inhalor    Gavin esophagus     taking omeprazole    Diabetes (HCC)     Pre-Diabetes-does not check glucose at home    GERD (gastroesophageal reflux disease)     Heart burn     taking omeprazole    High cholesterol     Hypertension     Indigestion     taking omeprazole    Left knee pain 09/20/2023    Osteoarthritis     PONV (postoperative nausea and vomiting)     with codiene and opiates    Right knee pain 03/07/2023    Seasonal allergies        Past Surgical History:   Procedure Laterality Date    PB MANIPULATN KNEE JT+ANESTHESIA Left 1/23/2024    Procedure: LEFT KNEE MANIPULATION;  Surgeon: Jarrell Harris M.D.;  Location: [facility] [place];   Service: Orthopedics    PB TOTAL KNEE ARTHROPLASTY Left 10/18/2023    Procedure: LEFT TOTAL KNEE ARTHROPLASTY - KYLEE;  Surgeon: Jarrell Harris M.D.;  Location: [facility];  Service: Ortho Robotic    PB TOTAL KNEE ARTHROPLASTY  5/10/2023    Procedure: RIGHT TOTAL KNEE ARTHROPLASTY;  Surgeon: Jarrell Harris M.D.;  Location: [facility];  Service: Orthopedics    PB KNEE SCOPE,MED/LAT MENISECTOMY Right 08/02/2022    Procedure: RIGH KNEE ARTHROSCOPY MEDIAL MENISCECTOMY, REPAIRS AS INDICATED;  Surgeon: Jarrell Harris M.D.;  Location: [facility] [place];  Service: Orthopedics    LAP, REMOVE ADJUST NATIVIDAD RESTRICT D* N/A 04/21/2022    Procedure: REMOVAL, GASTRIC BAND AND PORT, LAPAROSCOPIC;  Surgeon: Driss Louis M.D.;  [facility];  Service: General    IN UPPER GI ENDOSCOPY,DIAGNOSIS  06/03/2021    Procedure: GASTROSCOPY;  Surgeon: Jaime Starr M.D.;  Location: [facility];  Service: EUS    EGD W/ENDOSCOPIC ULTRASOUND  06/03/2021    Procedure: EGD, WITH ENDOSCOPIC US;  Surgeon: Jaime Starr M.D.;  Location: [facility];  Service: EUS    EGD WITH ASP/BX  06/03/2021    Procedure: EGD, WITH ASPIRATION BIOPSY;  Surgeon: Jaime Starr M.D.;  Location: [facility];  Service: EUS    SHOULDER ARTHROSCOPY Left 2019    SHOULDER ARTHROSCOPY Right 2018    CHOLECYSTECTOMY  2017    GASTRIC BANDING LAPAROSCOPIC  2008    OTHER ORTHOPEDIC SURGERY Right 1998    tarsal fusion     OTHER ORTHOPEDIC SURGERY Right     foot surgery    TONSILLECTOMY      as a child       Family History   Problem Relation Age of Onset    Lung Disease Mother     Hypertension Mother     Lung Disease Father     Cancer Father     Hypertension Father        Social History     Socioeconomic History    Marital status:      Spouse name: Not on file    Number of children: Not on file    Years of education: Not on file    Highest education level: Not on file  "  Occupational History    Not on file   Tobacco Use    Smoking status: Never    Smokeless tobacco: Never   Vaping Use    Vaping status: Never Used   Substance and Sexual Activity    Alcohol use: Not Currently    Drug use: Never    Sexual activity: Not on file   Other Topics Concern    Not on file   Social History Narrative    Not on file     Social Determinants of Health     Financial Resource Strain: Not on file   Food Insecurity: Not on file   Transportation Needs: Not on file   Physical Activity: Not on file   Stress: Not on file   Social Connections: Not on file   Intimate Partner Violence: Not on file   Housing Stability: Not on file       Allergies as of 05/28/2024 - Reviewed 05/28/2024   Allergen Reaction Noted    Soap Shortness of Breath 04/21/2022    Clindamycin Rash and Vomiting 05/17/2023    Codeine Itching, Nausea, Unspecified, and Vomiting 04/18/2022    Hydrocodone Vomiting and Nausea 03/02/2021    Oxycodone Itching and Nausea 01/22/2019        Vitals:  /84 (BP Location: Left arm, Patient Position: Sitting, BP Cuff Size: Adult)   Pulse 81   Resp 14   Ht 1.727 m (5' 8\")   Wt 124 kg (274 lb)   SpO2 92%     Current medications as of today   Current Outpatient Medications   Medication Sig Dispense Refill    amLODIPine (NORVASC) 2.5 MG Tab Take 2.5 mg by mouth every day.      MOUNJARO 5 MG/0.5ML Solution Pen-injector inject 5 mg SC weekly Subcutaneous weekly for 90 days      budesonide-formoterol (BREYNA) 160-4.5 MCG/ACT Aerosol USE ONE PUFF EVERY 12 HOURS FOR SHORTNESS OF BREATH 10.2 Each 1    Bempedoic Acid-Ezetimibe (NEXLIZET) 180-10 MG Tab Take  by mouth.      montelukast (SINGULAIR) 10 MG Tab TAKE 1 TABLET BY MOUTH IN THE EVENING 90 Tablet 3    lidocaine (ASPERFLEX) 4 % Patch Place 1 Patch on the skin 1 time a day as needed (As directed on package). 10 Patch 0    Azelastine (ASTELIN) 137 MCG/SPRAY Solution Administer 2 Sprays into affected nostril(S) 2 times a day.      Cholecalciferol " (VITAMIN D3) 125 MCG (5000 UT) Cap Take 5,000 Units by mouth.      rivaroxaban (XARELTO) 20 MG Tab tablet 1 tablet with food Orally Once a day      acetaminophen (TYLENOL) 650 MG CR tablet Take 1,300 mg by mouth every day.      traZODone (DESYREL) 100 MG Tab Take  mg by mouth at bedtime as needed.      famotidine (PEPCID) 20 MG Tab Take 20 mg by mouth every evening.      omeprazole (PRILOSEC) 20 MG delayed-release capsule Take 20 mg by mouth every morning.      zinc gluconate 50 MG Tab tablet Take 50 mg by mouth. TWICE PER WEEK      metoprolol SR (TOPROL XL) 25 MG TABLET SR 24 HR Take 12.5 mg by mouth 2 times a day.      albuterol (PROAIR HFA) 108 (90 Base) MCG/ACT Aero Soln inhalation aerosol Inhale 2 Puffs every 6 hours as needed for Shortness of Breath. 25.5 g 4    Coenzyme Q10 (CO Q-10) 200 MG Cap Take 200 mg by mouth every day.      Cyanocobalamin (VITAMIN B-12 PO) Take 1 Tablet by mouth every day.      Magnesium 400 MG Cap Take 1 capsule by mouth every day.       No current facility-administered medications for this visit.         Physical Exam:   Gen:           Alert and oriented, No apparent distress. Mood and affect appropriate, normal interaction with examiner.  Eyes:          PERRL, EOM intact, sclere white, conjunctive moist.  Ears:          Not examined.   Hearing:     Grossly intact.  Nose:          Normal, no lesions or deformities.  Dentition:    Not examined.   Oropharynx:   Not examined.   Mallampati Classification: Not examined.   Neck:        Supple, trachea midline, no masses.  Respiratory Effort: No intercostal retractions or use of accessory muscles.   Lung Auscultation:      Clear to auscultation bilaterally; no rales, rhonchi or wheezing.  CV:            Regular rate and rhythm. No murmurs, rubs or gallops.  Abd:           Not examined.  Lymphadenopathy: Not examined.   Gait and Station: Normal.  Digits and Nails: No clubbing, cyanosis, petechiae, or nodes.   Cranial Nerves: II-XII  "grossly intact.  Skin:        No rashes, lesions or ulcers noted.               Ext:           No cyanosis or edema.      Assessment:  1. Mild persistent asthma without complication  PULMONARY FUNCTION TESTS -Test requested: Complete Pulmonary Function Test; Include MIPS/MEPS? No      2. Gavin's esophagus with dysplasia        3. Gastroesophageal reflux disease without esophagitis        4. Essential hypertension        5. BMI 40.0-44.9, adult (HCC)  HEIGHT AND WEIGHT      6. Nonsmoker        7. Obstructive sleep apnea  DME CPAP                 Immunizations:    Flu:recommend fall 2024  Pneumovax 23:2021  Prevnar 13:not due  PCV 20: not due  COVID-19: 9/27/23    Plan:  Mild persistent asthma improved. Continue current bronchodilator and repeat PFT 6mos from last.  GERD/Merrick's well controlled.  See above.  Follow up with Cardio/PCP for ongoing management of HTN.  Encourage weight loss through healthy diet and activity.  Recommend starting APAP with overnight oximetry testing once acclimated. Desensitization discussed to acclimate due to \"claustrophobia\" noted. Reviewed compliance measures.   DME CPAP 5-15cm  Follow up in 4 months for review of respiratory symptoms, sooner if needed. Follow up with sleep medicine for 1st compliance check.    Please note that this dictation was created using voice recognition software. I have made every reasonable attempt to correct obvious errors, but it is possible there are errors of grammar and possibly content that I did not discover before finalizing the note.      "

## 2024-06-04 ENCOUNTER — APPOINTMENT (RX ONLY)
Dept: URBAN - METROPOLITAN AREA CLINIC 6 | Facility: CLINIC | Age: 69
Setting detail: DERMATOLOGY
End: 2024-06-04

## 2024-06-04 DIAGNOSIS — L82.1 OTHER SEBORRHEIC KERATOSIS: ICD-10-CM

## 2024-06-04 DIAGNOSIS — Z71.89 OTHER SPECIFIED COUNSELING: ICD-10-CM

## 2024-06-04 DIAGNOSIS — Z85.828 PERSONAL HISTORY OF OTHER MALIGNANT NEOPLASM OF SKIN: ICD-10-CM

## 2024-06-04 DIAGNOSIS — L57.0 ACTINIC KERATOSIS: ICD-10-CM

## 2024-06-04 DIAGNOSIS — D22 MELANOCYTIC NEVI: ICD-10-CM

## 2024-06-04 DIAGNOSIS — L81.4 OTHER MELANIN HYPERPIGMENTATION: ICD-10-CM

## 2024-06-04 DIAGNOSIS — D18.0 HEMANGIOMA: ICD-10-CM

## 2024-06-04 PROBLEM — D22.61 MELANOCYTIC NEVI OF RIGHT UPPER LIMB, INCLUDING SHOULDER: Status: ACTIVE | Noted: 2024-06-04

## 2024-06-04 PROBLEM — D22.71 MELANOCYTIC NEVI OF RIGHT LOWER LIMB, INCLUDING HIP: Status: ACTIVE | Noted: 2024-06-04

## 2024-06-04 PROBLEM — D22.72 MELANOCYTIC NEVI OF LEFT LOWER LIMB, INCLUDING HIP: Status: ACTIVE | Noted: 2024-06-04

## 2024-06-04 PROBLEM — D22.62 MELANOCYTIC NEVI OF LEFT UPPER LIMB, INCLUDING SHOULDER: Status: ACTIVE | Noted: 2024-06-04

## 2024-06-04 PROBLEM — D48.5 NEOPLASM OF UNCERTAIN BEHAVIOR OF SKIN: Status: ACTIVE | Noted: 2024-06-04

## 2024-06-04 PROBLEM — D18.01 HEMANGIOMA OF SKIN AND SUBCUTANEOUS TISSUE: Status: ACTIVE | Noted: 2024-06-04

## 2024-06-04 PROBLEM — D22.5 MELANOCYTIC NEVI OF TRUNK: Status: ACTIVE | Noted: 2024-06-04

## 2024-06-04 PROCEDURE — 99213 OFFICE O/P EST LOW 20 MIN: CPT | Mod: 25

## 2024-06-04 PROCEDURE — 17000 DESTRUCT PREMALG LESION: CPT | Mod: 59

## 2024-06-04 PROCEDURE — ? BIOPSY BY SHAVE METHOD

## 2024-06-04 PROCEDURE — ? COUNSELING

## 2024-06-04 PROCEDURE — 11102 TANGNTL BX SKIN SINGLE LES: CPT

## 2024-06-04 PROCEDURE — ? LIQUID NITROGEN

## 2024-06-04 PROCEDURE — 17003 DESTRUCT PREMALG LES 2-14: CPT

## 2024-06-04 ASSESSMENT — LOCATION DETAILED DESCRIPTION DERM
LOCATION DETAILED: LEFT CENTRAL MALAR CHEEK
LOCATION DETAILED: RIGHT MID-UPPER BACK
LOCATION DETAILED: LEFT VENTRAL DISTAL FOREARM
LOCATION DETAILED: LEFT SUPERIOR PARIETAL SCALP
LOCATION DETAILED: LEFT ANTERIOR DISTAL UPPER ARM
LOCATION DETAILED: LEFT CENTRAL PARIETAL SCALP
LOCATION DETAILED: LEFT PROXIMAL DORSAL FOREARM
LOCATION DETAILED: LEFT INFERIOR UPPER BACK
LOCATION DETAILED: LEFT PROXIMAL CALF
LOCATION DETAILED: RIGHT DISTAL CALF
LOCATION DETAILED: RIGHT ANTERIOR DISTAL UPPER ARM
LOCATION DETAILED: RIGHT PROXIMAL DORSAL FOREARM
LOCATION DETAILED: RIGHT PROXIMAL CALF
LOCATION DETAILED: RIGHT ANTERIOR PROXIMAL THIGH
LOCATION DETAILED: LEFT ANTERIOR PROXIMAL THIGH
LOCATION DETAILED: LEFT SUPERIOR OCCIPITAL SCALP
LOCATION DETAILED: RIGHT SUPERIOR LATERAL MIDBACK
LOCATION DETAILED: LEFT DISTAL POSTERIOR THIGH
LOCATION DETAILED: RIGHT VENTRAL DISTAL FOREARM
LOCATION DETAILED: RIGHT VENTRAL PROXIMAL FOREARM
LOCATION DETAILED: RIGHT MEDIAL INFERIOR CHEST
LOCATION DETAILED: RIGHT DISTAL POSTERIOR THIGH

## 2024-06-04 ASSESSMENT — LOCATION SIMPLE DESCRIPTION DERM
LOCATION SIMPLE: RIGHT THIGH
LOCATION SIMPLE: LEFT POSTERIOR THIGH
LOCATION SIMPLE: RIGHT UPPER BACK
LOCATION SIMPLE: SCALP
LOCATION SIMPLE: LEFT OCCIPITAL SCALP
LOCATION SIMPLE: LEFT CALF
LOCATION SIMPLE: RIGHT FOREARM
LOCATION SIMPLE: LEFT THIGH
LOCATION SIMPLE: LEFT UPPER BACK
LOCATION SIMPLE: RIGHT CALF
LOCATION SIMPLE: CHEST
LOCATION SIMPLE: RIGHT POSTERIOR THIGH
LOCATION SIMPLE: LEFT UPPER ARM
LOCATION SIMPLE: RIGHT UPPER ARM
LOCATION SIMPLE: LEFT CHEEK
LOCATION SIMPLE: LEFT FOREARM
LOCATION SIMPLE: RIGHT LOWER BACK

## 2024-06-04 ASSESSMENT — LOCATION ZONE DERM
LOCATION ZONE: LEG
LOCATION ZONE: FACE
LOCATION ZONE: ARM
LOCATION ZONE: SCALP
LOCATION ZONE: TRUNK

## 2024-06-04 NOTE — PROCEDURE: BIOPSY BY SHAVE METHOD
Detail Level: Detailed
Depth Of Biopsy: dermis
Was A Bandage Applied: Yes
Size Of Lesion In Cm: 1
X Size Of Lesion In Cm: 0
Biopsy Type: H and E
Biopsy Method: Personna blade
Anesthesia Type: 1% lidocaine with epinephrine
Anesthesia Volume In Cc: 0.5
Hemostasis: Drysol
Wound Care: Petrolatum
Dressing: bandage
Destruction After The Procedure: No
Type Of Destruction Used: Electrodesiccation and Curettage
Curettage Text: The wound bed was treated with curettage after the biopsy was performed.
Cryotherapy Text: The wound bed was treated with cryotherapy after the biopsy was performed.
Electrodesiccation Text: The wound bed was treated with electrodesiccation after the biopsy was performed.
Electrodesiccation And Curettage Text: The wound bed was treated with electrodesiccation and curettage after the biopsy was performed. Treated x 3
Silver Nitrate Text: The wound bed was treated with silver nitrate after the biopsy was performed.
Lab: [id number]
Lab Facility: 
Consent: Written consent was obtained and risks were reviewed including but not limited to scarring, infection, bleeding, scabbing, incomplete removal, nerve damage and allergy to anesthesia.
Post-Care Instructions: I reviewed with the patient in detail post-care instructions. Patient is to keep the biopsy site dry overnight, and then apply bacitracin twice daily until healed. Patient may apply hydrogen peroxide soaks to remove any crusting.
Notification Instructions: Patient will be notified of biopsy results. However, patient instructed to call the office if not contacted within 2 weeks.
Billing Type: Third-Party Bill
Information: Selecting Yes will display possible errors in your note based on the variables you have selected. This validation is only offered as a suggestion for you. PLEASE NOTE THAT THE VALIDATION TEXT WILL BE REMOVED WHEN YOU FINALIZE YOUR NOTE. IF YOU WANT TO FAX A PRELIMINARY NOTE YOU WILL NEED TO TOGGLE THIS TO 'NO' IF YOU DO NOT WANT IT IN YOUR FAXED NOTE.

## 2024-06-05 ENCOUNTER — HOSPITAL ENCOUNTER (OUTPATIENT)
Dept: RADIOLOGY | Facility: MEDICAL CENTER | Age: 69
End: 2024-06-05
Attending: FAMILY MEDICINE
Payer: COMMERCIAL

## 2024-06-05 DIAGNOSIS — Z12.31 SCREENING MAMMOGRAM, ENCOUNTER FOR: ICD-10-CM

## 2024-06-05 PROCEDURE — 77063 BREAST TOMOSYNTHESIS BI: CPT

## 2024-06-13 ENCOUNTER — HOSPITAL ENCOUNTER (OUTPATIENT)
Dept: RADIOLOGY | Facility: MEDICAL CENTER | Age: 69
End: 2024-06-13
Attending: NURSE PRACTITIONER
Payer: COMMERCIAL

## 2024-06-13 DIAGNOSIS — D50.9 IRON DEFICIENCY ANEMIA, UNSPECIFIED IRON DEFICIENCY ANEMIA TYPE: ICD-10-CM

## 2024-06-13 DIAGNOSIS — Z98.84 HISTORY OF BARIATRIC SURGERY: ICD-10-CM

## 2024-06-13 DIAGNOSIS — Z01.818 PREOP EXAMINATION: ICD-10-CM

## 2024-06-13 PROCEDURE — 74248 X-RAY SM INT F-THRU STD: CPT

## 2024-06-19 ENCOUNTER — APPOINTMENT (RX ONLY)
Dept: URBAN - METROPOLITAN AREA CLINIC 6 | Facility: CLINIC | Age: 69
Setting detail: DERMATOLOGY
End: 2024-06-19

## 2024-06-19 DIAGNOSIS — L82.1 OTHER SEBORRHEIC KERATOSIS: ICD-10-CM

## 2024-06-19 PROBLEM — C44.612 BASAL CELL CARCINOMA OF SKIN OF RIGHT UPPER LIMB, INCLUDING SHOULDER: Status: ACTIVE | Noted: 2024-06-19

## 2024-06-19 PROCEDURE — ? COUNSELING

## 2024-06-19 PROCEDURE — ? CURETTAGE AND DESTRUCTION

## 2024-06-19 PROCEDURE — 17261 DSTRJ MAL LES T/A/L .6-1.0CM: CPT

## 2024-06-19 PROCEDURE — 99212 OFFICE O/P EST SF 10 MIN: CPT | Mod: 25

## 2024-06-19 ASSESSMENT — LOCATION DETAILED DESCRIPTION DERM: LOCATION DETAILED: RIGHT LATERAL PROXIMAL PRETIBIAL REGION

## 2024-06-19 ASSESSMENT — LOCATION ZONE DERM: LOCATION ZONE: LEG

## 2024-06-19 ASSESSMENT — LOCATION SIMPLE DESCRIPTION DERM: LOCATION SIMPLE: RIGHT PRETIBIAL REGION

## 2024-06-19 NOTE — PROCEDURE: CURETTAGE AND DESTRUCTION
Detail Level: Detailed
Biopsy Photograph Reviewed: Yes
Number Of Curettages: 3
Size Of Lesion In Cm: 1
Add Intralesional Injection: No
Concentration (Mg/Ml Or Millions Of Plaque Forming Units/Cc): 0.01
Anesthesia Type: 0.5% lidocaine without epinephrine
Anesthesia Volume In Cc: 2
Cautery Type: electrodesiccation
What Was Performed First?: Curettage
Final Size Statement: The size of the lesion after curettage was
Additional Information: (Optional): The wound was cleaned, and a pressure dressing was applied.  The patient received detailed post-op instructions.
Consent was obtained from the patient. The risks, benefits and alternatives to therapy were discussed in detail. Specifically, the risks of infection, scarring, bleeding, prolonged wound healing, nerve injury, incomplete removal, allergy to anesthesia and recurrence were addressed. Alternatives to ED&C, such as: surgical removal and XRT were also discussed.  Prior to the procedure, the treatment site was clearly identified and confirmed by the patient. All components of Universal Protocol/PAUSE Rule completed.
Post-Care Instructions: I reviewed with the patient in detail post-care instructions. Patient is to keep the area dry for 48 hours, and not to engage in any swimming until the area is healed. Should the patient develop any fevers, chills, bleeding, severe pain patient will contact the office immediately.
Bill As A Line Item Or As Units: Line Item

## 2024-08-20 ENCOUNTER — OFFICE VISIT (OUTPATIENT)
Dept: SLEEP MEDICINE | Facility: MEDICAL CENTER | Age: 69
End: 2024-08-20
Attending: PREVENTIVE MEDICINE
Payer: COMMERCIAL

## 2024-08-20 VITALS
OXYGEN SATURATION: 98 % | DIASTOLIC BLOOD PRESSURE: 90 MMHG | RESPIRATION RATE: 16 BRPM | HEART RATE: 88 BPM | HEIGHT: 69 IN | WEIGHT: 275 LBS | BODY MASS INDEX: 40.73 KG/M2 | SYSTOLIC BLOOD PRESSURE: 140 MMHG

## 2024-08-20 DIAGNOSIS — G47.33 OSA ON CPAP: ICD-10-CM

## 2024-08-20 DIAGNOSIS — I10 ESSENTIAL HYPERTENSION: ICD-10-CM

## 2024-08-20 DIAGNOSIS — J45.30 MILD PERSISTENT ASTHMA WITHOUT COMPLICATION: ICD-10-CM

## 2024-08-20 PROCEDURE — 3077F SYST BP >= 140 MM HG: CPT | Performed by: PREVENTIVE MEDICINE

## 2024-08-20 PROCEDURE — 99213 OFFICE O/P EST LOW 20 MIN: CPT | Performed by: PREVENTIVE MEDICINE

## 2024-08-20 PROCEDURE — 99204 OFFICE O/P NEW MOD 45 MIN: CPT | Performed by: PREVENTIVE MEDICINE

## 2024-08-20 PROCEDURE — 3080F DIAST BP >= 90 MM HG: CPT | Performed by: PREVENTIVE MEDICINE

## 2024-08-20 RX ORDER — TIRZEPATIDE 7.5 MG/.5ML
INJECTION, SOLUTION SUBCUTANEOUS
COMMUNITY

## 2024-08-20 ASSESSMENT — ENCOUNTER SYMPTOMS: SLEEP DISTURBANCE: 0

## 2024-08-20 ASSESSMENT — FIBROSIS 4 INDEX: FIB4 SCORE: 1.25

## 2024-08-20 NOTE — PROGRESS NOTES
"CHIEF COMPLIANT: \"Establish care/direct referral.\"  HISTORY OF PRESENT ILLNESS:  Sara Knight is a 68 y.o. female kindly referred by Yesi Negrete M.D. and  is here for a sleep medicine consultation.     Sleep History:  Sara Knight has been tested for sleep apnea. See below.  The patient reports light snoring, headaches, and poor quality sleep if not using her PAP therapy.  The patient goes to bed at 10:30 pm and wakes up at 7:30 am. It usually takes the patient approximately 30 mins to fall asleep. The patient does feel refreshed after sleeping and does not nap during the day. The patient has never  used tobacco.   Patient does not  use cannabis. This patient does not  drink  alcoholic beverages and does drink caffeinated beverages weekly (2).     The patient denies any symptoms of narcolepsy such as sleep paralysis or cataplexy, or any symptoms that suggest parasomnias such as sleep walking or acting out of dreams.    Sara Knight is retired      Denies family members who use PAP therapy/snore.     EPWORTH SCORE:    6/24, this is not consistent  with EDS.       COMPLIANCE DATA:  > 4 hours at  87 % This patient has met all insurance requirements for compliance regarding PAP therapy.  Machine type: Airsense 11 Autoset  Date range: 7/21-8/19/2024  AHI: 0.7  TIME USED: 8 hrs.  PRESSURE SETTINGS:  min 5, max 15 CWP  LEAK: acceptable  DME: [employer]      TYPE:    [x]HST   []In Lab split   []In Lab diagnostic   []In Lab titration  DATE: 4/29/2024  Diagnostic:AHI: 28, RDI 43.2  Diagnostic  Oxygen Saleem: 74% with 334.2 mins at or under 88%       Significant comorbidities and modifying factors: see below    PAST MEDICAL HISTORY:  Past Medical History:   Diagnosis Date    Adverse effect of anesthesia 2017    Bradycardiac with gallbladder surgery    Anesthesia     PONV    Aortic valve sclerosis     MILD-without arotic stenosis per echo report 7/6/22    Apnea, sleep     Arrhythmia     Afib- Follows with Dr DUNLAP" Ramon @ [facility]    Arthritis     osteo    Asthma     daily inhalor    Atrial fibrillation (HCC)     Back pain     Gavin esophagus     taking omeprazole    Bronchitis     Chickenpox     Cough     Diabetes (HCC)     Pre-Diabetes-does not check glucose at home    GERD (gastroesophageal reflux disease)     Heart burn     taking omeprazole    Heartburn     High cholesterol     Hypertension     Indigestion     taking omeprazole    Left knee pain 09/20/2023    Morning headache     Mumps     Obesity     Osteoarthritis     Painful joint     Palpitations     Pneumonia     PONV (postoperative nausea and vomiting)     with codiene and opiates    Rhinitis     Right knee pain 03/07/2023    Seasonal allergies     Sleep apnea     Wears glasses     Wheezing       PROBLEM LIST:  Patient Active Problem List    Diagnosis Date Noted    Mild persistent asthma without complication 03/05/2024    Arthrofibrosis of knee joint, left 01/15/2024    Degenerative arthritis of left knee 08/21/2023    Arthritis of right knee 08/02/2022    Complex tear of medial meniscus of right knee 06/15/2022    Postoperative pain 04/21/2022    Paroxysmal atrial fibrillation (HCC) 03/16/2021    Weight gain 03/03/2021    Gastroesophageal reflux disease without esophagitis 03/03/2021    Impaired fasting glucose 03/03/2021    H/O laparoscopic adjustable gastric banding 03/03/2021    Hypercholesterolemia 03/03/2021    Essential hypertension 03/03/2021    Metabolic syndrome 03/03/2021    Gavin's esophagus with dysplasia 03/03/2021     PAST SOCIAL HISTORY:  Past Surgical History:   Procedure Laterality Date    PB MANIPULATN KNEE JT+ANESTHESIA Left 01/23/2024    Procedure: LEFT KNEE MANIPULATION;  Surgeon: Jarrell Harris M.D.;  Location: [facility];  Service: Orthopedics    PB TOTAL KNEE ARTHROPLASTY Left 10/18/2023    Procedure: LEFT TOTAL KNEE ARTHROPLASTY - [facility];  Surgeon: Jarrell Harris M.D.;  Location: [facility];  Service: Ortho Robotic    PB TOTAL KNEE ARTHROPLASTY  05/10/2023    Procedure: RIGHT TOTAL KNEE ARTHROPLASTY;  Surgeon: Jarrell Harris M.D.;  Location: [facility];  Service: Orthopedics    PB KNEE SCOPE,MED/LAT MENISECTOMY Right 08/02/2022    Procedure: RIGH KNEE ARTHROSCOPY MEDIAL MENISCECTOMY, REPAIRS AS INDICATED;  Surgeon: Jarrell Harris M.D.;  Location: [facility];  Service: Orthopedics    LAP, REMOVE ADJUST NATIVIDAD RESTRICT D* N/A 04/21/2022    Procedure: REMOVAL, GASTRIC BAND AND PORT, LAPAROSCOPIC;  Surgeon: Driss Louis M.D.;  Location: [facility];  Service: General    ND UPPER GI ENDOSCOPY,DIAGNOSIS  06/03/2021    Procedure: GASTROSCOPY;  Surgeon: Jaime Starr M.D.;  Location: [facility];  Service: EUS    EGD W/ENDOSCOPIC ULTRASOUND  06/03/2021    Procedure: EGD, WITH ENDOSCOPIC US;  Surgeon: Jaime Starr M.D.;  Location: [facility];  Service: EUS    EGD WITH ASP/BX  06/03/2021    Procedure: EGD, WITH ASPIRATION BIOPSY;  Surgeon: Jaime Starr M.D.;  Location: [facility];  Service: EUS    SHOULDER ARTHROSCOPY Left 2019    SHOULDER ARTHROSCOPY Right 2018    CHOLECYSTECTOMY  2017    GASTRIC BANDING LAPAROSCOPIC  2008    OTHER ORTHOPEDIC SURGERY Right 1998    tarsal fusion     ARTHROSCOPY, KNEE      OTHER ORTHOPEDIC SURGERY Right     foot surgery    SLEEVE,LILLIE VASO THIGH      TONSILLECTOMY      as a child     PAST FAMILY HISTORY:  Family History   Problem Relation Age of Onset    Lung Disease Mother     Hypertension Mother     Lung Cancer Mother     Lung Disease Father     Cancer Father     Hypertension Father     Colon Cancer Father         Colon cancer,    Lung Cancer Father      SOCIAL HISTORY:  Social History     Socioeconomic History    Marital status:      Spouse name: Not on file    Number of children: Not on file    Years of education: Not on file    Highest education level: Not on file    Occupational History    Not on file   Tobacco Use    Smoking status: Never    Smokeless tobacco: Never   Vaping Use    Vaping status: Never Used   Substance and Sexual Activity    Alcohol use: Not Currently    Drug use: Not Currently    Sexual activity: Not on file   Other Topics Concern    Not on file   Social History Narrative    Not on file     Social Determinants of Health     Financial Resource Strain: Not on file   Food Insecurity: Not on file   Transportation Needs: Not on file   Physical Activity: Not on file   Stress: Not on file   Social Connections: Not on file   Intimate Partner Violence: Not on file   Housing Stability: Not on file     ALLERGIES: Soap, Clindamycin, Codeine, Hydrocodone, and Oxycodone  MEDICATIONS:  Current Outpatient Medications   Medication Sig Dispense Refill    Tirzepatide (MOUNJARO) 7.5 MG/0.5ML Solution Pen-injector inject 10 mg Subcutaneous weekly for 90 days      amLODIPine (NORVASC) 2.5 MG Tab Take 2.5 mg by mouth every day.      budesonide-formoterol (BREYNA) 160-4.5 MCG/ACT Aerosol USE ONE PUFF EVERY 12 HOURS FOR SHORTNESS OF BREATH 10.2 Each 1    Bempedoic Acid-Ezetimibe (NEXLIZET) 180-10 MG Tab Take  by mouth.      montelukast (SINGULAIR) 10 MG Tab TAKE 1 TABLET BY MOUTH IN THE EVENING 90 Tablet 3    Azelastine (ASTELIN) 137 MCG/SPRAY Solution Administer 2 Sprays into affected nostril(S) 2 times a day.      Cholecalciferol (VITAMIN D3) 125 MCG (5000 UT) Cap Take 5,000 Units by mouth.      rivaroxaban (XARELTO) 20 MG Tab tablet 1 tablet with food Orally Once a day      traZODone (DESYREL) 100 MG Tab Take  mg by mouth at bedtime as needed.      famotidine (PEPCID) 20 MG Tab Take 20 mg by mouth every evening.      omeprazole (PRILOSEC) 20 MG delayed-release capsule Take 20 mg by mouth every morning.      zinc gluconate 50 MG Tab tablet Take 50 mg by mouth. TWICE PER WEEK      metoprolol SR (TOPROL XL) 25 MG TABLET SR 24 HR Take 12.5 mg by mouth 2 times a day.       "albuterol (PROAIR HFA) 108 (90 Base) MCG/ACT Aero Soln inhalation aerosol Inhale 2 Puffs every 6 hours as needed for Shortness of Breath. 25.5 g 4    Coenzyme Q10 (CO Q-10) 200 MG Cap Take 200 mg by mouth every day.      Cyanocobalamin (VITAMIN B-12 PO) Take 1 Tablet by mouth every day.      Magnesium 400 MG Cap Take 1 capsule by mouth every day.      MOUNJARO 5 MG/0.5ML Solution Pen-injector inject 5 mg SC weekly Subcutaneous weekly for 90 days      lidocaine (ASPERFLEX) 4 % Patch Place 1 Patch on the skin 1 time a day as needed (As directed on package). 10 Patch 0    acetaminophen (TYLENOL) 650 MG CR tablet Take 1,300 mg by mouth every day.       No current facility-administered medications for this visit.    \"CURRENT RX\"    REVIEW OF SYSTEMS:  Constitutional: Denies weight loss, endorses chronic daytime fatigue --also see HPI    PHYSICAL EXAM/VITALS:  BP (!) 140/90 (BP Location: Left arm, Patient Position: Sitting, BP Cuff Size: Large adult)   Pulse 88   Resp 16   Ht 1.753 m (5' 9\")   Wt 125 kg (275 lb)   LMP  (LMP Unknown)   SpO2 98%   BMI 40.61 kg/m²   Neck circumference (inches): 17  Appearance: Well-nourished, well-developed,  looks stated age, no acute distress  Eyes:   EOMI  ENMT:   Hard palate narrow: No   Hard palate high: No   Soft palate/uvula (Mallampati score): 4  Tongue Scalloping: No   Retrognathia:  No   Micrognathia:  No    Neck: Supple, trachea midline  Respiratory effort:  No intercostal retractions or use of accessory muscles  Lung auscultation:  No wheezes rhonchi rubs or rales  Cardiac: No murmurs, rubs, or gallops; regular rhythm, normal rate; no edema  Musculoskeletal:  Grossly normal; gait and station normal  Neurologic:  oriented to person, time, place, and purpose; judgement intact  Psychiatric:  No depression, anxiety, agitation    MEDICAL DECISION MAKING:  The medical record was reviewed as it pertains to this referral. This includes records from primary care,consultants notes, " referral request, hospital records, labs and imaging. Any available diagnostic and titration nocturnal polysomnograms, home sleep apnea tests, continuous nocturnal oximetry results, multiple sleep latency tests, and recent compliance reports were reviewed with the patient.    ASSESSMENT/PLAN:  Sara Knight is a 68 y.o. female  who has been diagnosed with severe obstructive sleep apnea and severe nocturnal hypoxia.  This patient has already been issued a PAP machine.  She appears to be doing reasonably well on her current PAP therapy.  However, she will need an OPO while using PAP to determine if PAP alone can correct her O2 deficit.      DIAGNOSES :    1. ZURDO on CPAP  - Overnight Oximetry; Future  - DME Mask and Supplies    2. Mild persistent asthma without complication  - Overnight Oximetry; Future  - DME Mask and Supplies    3. Essential hypertension  - Overnight Oximetry; Future  - DME Mask and Supplies    4. BMI 38.0-38.9,adult  - Overnight Oximetry; Future  - DME Mask and Supplies    Other orders  - Tirzepatide (MOUNJARO) 7.5 MG/0.5ML Solution Pen-injector; inject 10 mg Subcutaneous weekly for 90 days    The patient has signs and symptoms consistent with obstructive sleep apnea hypopnea syndrome. Will schedule a nocturnal polysomnogram or a home sleep apnea test (please see plan).  The risks of untreated sleep apnea were discussed with the patient at length. Patients with ZURDO are at increased risk of cardiovascular disease including coronary artery disease, systemic arterial hypertension, pulmonary arterial hypertension, cardiac arrhythmias, and stroke. ZURDO patients have an increased risk of motor vehicle accidents, type 2 diabetes, chronic kidney disease, and non-alcoholic liver disease. The patient was advised to avoid driving a motor vehicle when drowsy.  Have advised the patient to follow up with the appropriate healthcare practitioners for all other medical problems and issues.    RETURN TO CLINIC:  Return in about 6 months (around 2/20/2025) for for OPO results, with any available provider.    My total time spent caring for the patient on the day of the encounter was 40 minutes. This includes time spent on a thorough chart review including other physician notes, all sleep studies, as well as critical labs and pulmonary and cardiac studies.  Additionally, it includes a thorough discussion of good sleep hygiene and stimulus control, as well as  the need for consistency in terms of sleep preparation and practice.    Please note that this dictation was created using voice recognition software.  I have made every reasonable attempt to correct obvious errors, I expect that there are errors of grammar and possibly content that I did not discover before finalizing this note.                        Answers submitted by the patient for this visit:  Sleep Center Questionnaire (Submitted on 8/20/2024)  Year of your last physical exam: 2024  Occupation : Retired  Height: 5’9”  Current weight: 275  6 months ago: 286  At age 20: 185  What is the reason for your visit today?: Sleep apnea  Name of person referring you to the Sleep Center: Daniella Cedillo  Have you ever been hospitalized?: Yes  Reason, year, and hospital in which you were hospitalized:: [place]…. July 2024  [facility]  Have you ever had problems with anesthesia?: No  Have you experienced post-operative delirium?: No  Any complications with surgery?: No  What year did you receive your last Flu shot?: 2023  What year did you receive you last Pneumonia shot?: 2020  Have you had a TB skin test? If so, please list the year and result:: No  Have you had Allergy skin testing? If so, please list the year and result:: No  Please briefly describe your sleep problem and how old you were when it began.: I never really snored so I wasn’t aware. I had a sleep apnea problem.  How does this affect your daily life and activities? Please also rate how serious of a  problem this is (1 = Not at all, 10 = Very Serious).: 7-8  Have you had any previous evaluations, examinations, or treatment for this sleep problem or any other problems with your sleep? If so, please describe the evaluation, treatment, and results.: No  Do you have a regular bed partner?: Yes  How many minutes does it usually take to fall asleep at night after turning off the lights?: 1/2 hour  What do you ordinarily do just prior to turning out the lights and attempting to go to sleep (e.g., reading, TV, baths, etc.)?: Watch tv then brush teeth etc  On average, how many times do you wake up during the night?: 3-4  On average, how many times do you wake up to use the bathroom?: 2  Do you often wake up too early in the morning and are unable to return to sleep?: No  On average, how many hours of sleep do you get per night?: 7-8  How do you usually awaken?  Alarm, spontaneously, or other?: Dponyaneoudly  Is it difficult for you to awaken and get out of bed after sleeping? (Not at all, Sometimes, Very): No  Do you nap or return to bed after arising?: Nap  Are you bothered by sleepiness during the day?: No  Do you feel that you get too much sleep at night?: No  Do you feel that you get too little sleep at night?: Sometimes  Do you usually feel tired during the day? If so, what do you attribute this to?: Sometimes  Do you find yourself falling asleep when you don't mean to? : No  Do you feel rested or refreshed after the sleep episode?: Yes  Have you ever suddenly fallen?: No  Have you ever experienced sudden body weakness?: No  Have you ever experienced weakness or paralysis upon going to sleep?: No  Have you ever experienced weakness or paralysis upon awakening from sleep?: No  Have you ever experienced seeing things or hearing voices/noises: That weren't real? On going to sleep? During the night? On awakening from sleep? During the day?: No  Do you have difficulty breathing at night? If yes, briefly describe.: If im  "congrsted  Have you been told you snore while asleep? If so, does it disturb a bed partner (or someone in the same room), or someone in the next room?: Yes sometimes  Have you ever experienced doing something without being aware of the action? If yes, please describe.: No  Have you ever experienced upon lying in bed before sleep or on awakening from sleep: Restlessness of legs, \"nervous legs\", \"creeping crawling\" sensation of legs, or twitching of legs?: Yes sometimes  How many times per week does this occur, and how many minutes does the sensation last?: 1x every other week  Does anything relieve the sensations (e.g., getting out of bed, medication, massage)?: Yes  At what age did this first occur, and how many years has this occurred?: Just several tears  Have you ever been told that your arms or legs jerk or twitch while you are asleep? If yes, how many times per night does this occur?: No  Does this seem to awaken you from your sleep?: No  Do you know, or have you ever been told that you do any of the following while sleeping: talk, walk, grit teeth, wet the bed, wake up screaming or seemingly afraid, have disturbing dreams, have unusual movements, wake up with headaches, (males) have erections? If yes to any of these, please indicate how many times per week, age started, last occurrence, treatment received.: No  Has anyone in your family been known to have any sleep problems? If yes, please list the type of problem (e.g., trouble getting to sleep, too sleepy, bed wetting, etc.), the relationship of this person to you, and the treatment received.: Spousr trouble getting to sleep    "

## 2024-08-21 ENCOUNTER — APPOINTMENT (OUTPATIENT)
Dept: SLEEP MEDICINE | Facility: MEDICAL CENTER | Age: 69
End: 2024-08-21
Attending: NURSE PRACTITIONER
Payer: COMMERCIAL

## 2024-08-22 ENCOUNTER — APPOINTMENT (OUTPATIENT)
Dept: SLEEP MEDICINE | Facility: MEDICAL CENTER | Age: 69
End: 2024-08-22
Attending: NURSE PRACTITIONER
Payer: COMMERCIAL

## 2024-08-30 ENCOUNTER — NON-PROVIDER VISIT (OUTPATIENT)
Dept: SLEEP MEDICINE | Facility: MEDICAL CENTER | Age: 69
End: 2024-08-30
Attending: NURSE PRACTITIONER
Payer: COMMERCIAL

## 2024-08-30 VITALS — WEIGHT: 277 LBS | BODY MASS INDEX: 43.47 KG/M2 | HEIGHT: 67 IN

## 2024-08-30 DIAGNOSIS — J45.30 MILD PERSISTENT ASTHMA WITHOUT COMPLICATION: Chronic | ICD-10-CM

## 2024-08-30 PROCEDURE — 94726 PLETHYSMOGRAPHY LUNG VOLUMES: CPT | Performed by: NURSE PRACTITIONER

## 2024-08-30 PROCEDURE — 94729 DIFFUSING CAPACITY: CPT | Mod: 26 | Performed by: INTERNAL MEDICINE

## 2024-08-30 PROCEDURE — 94729 DIFFUSING CAPACITY: CPT | Performed by: NURSE PRACTITIONER

## 2024-08-30 PROCEDURE — 94060 EVALUATION OF WHEEZING: CPT | Performed by: NURSE PRACTITIONER

## 2024-08-30 PROCEDURE — 94726 PLETHYSMOGRAPHY LUNG VOLUMES: CPT | Mod: 26 | Performed by: INTERNAL MEDICINE

## 2024-08-30 PROCEDURE — 94060 EVALUATION OF WHEEZING: CPT | Mod: 26 | Performed by: INTERNAL MEDICINE

## 2024-08-30 ASSESSMENT — FIBROSIS 4 INDEX: FIB4 SCORE: 1.25

## 2024-08-30 NOTE — PROCEDURES
Technician: MANGO Zabala    Technician Comment:  Good patient effort & cooperation.  The results of this test meet the ATS/ERS standards for acceptability & reproducibility.  Test was performed on the [employer] Body Plethysmograph-Elite DX system.  Predicted values were GLI-2012 for spirometry, GLI-2020 for DLCO, ITS for Lung Volumes.  The DLCO was uncorrected for Hgb.  A bronchodilator of Albuterol HFA -2puffs via spacer administered.  DLCO performed during dilation period.    Interpretation:

## 2024-09-06 ENCOUNTER — HOME STUDY (OUTPATIENT)
Dept: SLEEP MEDICINE | Facility: MEDICAL CENTER | Age: 69
End: 2024-09-06
Attending: PREVENTIVE MEDICINE
Payer: COMMERCIAL

## 2024-09-06 DIAGNOSIS — I10 ESSENTIAL HYPERTENSION: ICD-10-CM

## 2024-09-06 DIAGNOSIS — G47.33 OSA ON CPAP: ICD-10-CM

## 2024-09-06 DIAGNOSIS — J45.30 MILD PERSISTENT ASTHMA WITHOUT COMPLICATION: ICD-10-CM

## 2024-09-06 PROCEDURE — 94762 N-INVAS EAR/PLS OXIMTRY CONT: CPT | Performed by: INTERNAL MEDICINE

## 2024-09-06 PROCEDURE — 94761 N-INVAS EAR/PLS OXIMETRY MLT: CPT | Performed by: INTERNAL MEDICINE

## 2024-09-15 NOTE — PROCEDURES
This is an overnight oximetry study performed on September 9, 2024 for duration of 8 hours and 21 minutes on continuous positive airway pressure between 5 and 15 cm of water.    Basal SpO2 was 87%.  Total time spent below saturation of 88% is 299 minutes.  There is considerable variability in the SpO2 tracing.  Consider titration study versus addition of supplemental oxygen.

## 2024-09-21 DIAGNOSIS — G47.33 OSA ON CPAP: ICD-10-CM

## 2024-09-21 DIAGNOSIS — G47.34 NOCTURNAL HYPOXIA: ICD-10-CM

## 2024-09-30 ENCOUNTER — OFFICE VISIT (OUTPATIENT)
Dept: SLEEP MEDICINE | Facility: MEDICAL CENTER | Age: 69
End: 2024-09-30
Attending: NURSE PRACTITIONER
Payer: COMMERCIAL

## 2024-09-30 VITALS
HEART RATE: 75 BPM | OXYGEN SATURATION: 95 % | WEIGHT: 272 LBS | RESPIRATION RATE: 16 BRPM | DIASTOLIC BLOOD PRESSURE: 84 MMHG | BODY MASS INDEX: 40.29 KG/M2 | SYSTOLIC BLOOD PRESSURE: 124 MMHG | HEIGHT: 69 IN

## 2024-09-30 DIAGNOSIS — Z78.9 NONSMOKER: ICD-10-CM

## 2024-09-30 DIAGNOSIS — Z23 NEED FOR VACCINATION: ICD-10-CM

## 2024-09-30 DIAGNOSIS — J45.30 MILD PERSISTENT ASTHMA WITHOUT COMPLICATION: Chronic | ICD-10-CM

## 2024-09-30 PROCEDURE — 99213 OFFICE O/P EST LOW 20 MIN: CPT | Performed by: NURSE PRACTITIONER

## 2024-09-30 PROCEDURE — 90471 IMMUNIZATION ADMIN: CPT

## 2024-09-30 RX ORDER — BUDESONIDE AND FORMOTEROL FUMARATE DIHYDRATE 160; 4.5 UG/1; UG/1
AEROSOL RESPIRATORY (INHALATION)
Qty: 30.6 G | Refills: 3 | Status: SHIPPED | OUTPATIENT
Start: 2024-09-30

## 2024-09-30 RX ORDER — BUDESONIDE AND FORMOTEROL FUMARATE DIHYDRATE 160; 4.5 UG/1; UG/1
AEROSOL RESPIRATORY (INHALATION)
Qty: 30.6 G | Refills: 3 | Status: SHIPPED | OUTPATIENT
Start: 2024-09-30 | End: 2024-09-30

## 2024-09-30 RX ORDER — ALBUTEROL SULFATE 90 UG/1
2 INHALANT RESPIRATORY (INHALATION) EVERY 6 HOURS PRN
Qty: 54 G | Refills: 3 | Status: SHIPPED | OUTPATIENT
Start: 2024-09-30

## 2024-09-30 ASSESSMENT — FIBROSIS 4 INDEX: FIB4 SCORE: 1.25

## 2024-09-30 NOTE — PROGRESS NOTES
Chief Complaint   Patient presents with    Follow-Up     Asthma & Apnea  5/28/2024- ELINA GAITAN APRN     Results     PFT 8/30/2024, OPO 9/6/2024       HPI:  Sara Knight is a 68 y.o. year old female here today for follow-up on mild persistent asthma.  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 Fib on Xarelto.  Last OV 5/28/24 pulm  Last OV 8/20/24 Dr. Mullins sleep    PFT 8/30/2024 indicated normal pulmonary function test with positive bronchodilator response.  FEV1 1.99 L or 80%, ratio 73%.    CNOX 9/6/2024 indicated basal SpO2 of 87% and less than 88% for 299 minutes of study utilizing CPAP 5-15cm - titration study ordered but not completed.    Reviewed all testing with patient.    MMRC stGstrstastdstest:st st1st Exacerbations this year: July hospitalized pneumonia    Currently using Symbicort 160/4.5mcg 2 puffs BID, singulair qhs and albuterol hfa prn.     Interval events:  9/30/24: Patient notes after seeing her brother July 4th she became sick and went to ER and sent to [facility] hospital and treated for pneumonia with acute respiratory failure - discharged home on abx/steroids. She is back to baseline now.  She continues to use CPAP nightly.  No other complaints.  5/20/2024: Since last office visit patient underwent sinus surgery 4/19/2024 with balloon angioplasty notes breathing to be quite improved.  She notes a month of healing was necessary.  She is doing sinus rinses and Singulair at night.  Rare use of albuterol HFA inhaler.  She is consistent with her Symbicort 2 puffs twice daily.  She notes having some ongoing environmental allergies and unsure if she needs referral to allergist.  She notes shortness of breath overall is improved.  She did reduce metoprolol down to half tablet and her shortness of breath also improved.  She has not had any atrial fibrillation.  She is also on Slow Fe and vitamin C for anemia.  She notes when recently visiting [place] for 5 days she felt her breathing was excellent.  She  is on Mounjaro for weight loss and her A1c has improved.  She does note occasional dry mouth at night and uses azelastine for her sinuses as well.  She is surprised her home sleep study notes sleep apnea with persistent low oxygen levels.  3/5/2024: Patient noted increased shortness of breath with emergency room visit and sinus infection along with bronchitis.  Treated with antibiotics.  Please note increased anxiety due to her breathing.  She is having increased palpitations at that time.     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  Multiple environmental occupational exposures with history of first asthma attack in 1990s while at work.  Managed well with inhaled corticosteroids and long-acting beta agonist.  Both of her parents suffered from lung cancer but heavy tobacco users.  Father had colon cancer.  Spirometry 8/5/2021 notes FEV1 1.88 L 70% ratio 70%.  Mild obstruction noted.  PFT 3/20/2024 moderate obstruction with FEV1 1.72 L 60%, ratio 65, trend towards bronchodilator response, lung volumes normal, DLCO 141% predicted.   Chest x-ray 7/21/2022 noted clear lungs, normal heart size and degenerative changes in thoracic spine.    HST 4/29/2024 indicates moderate to severe obstructive sleep apnea with pAHI 4% 28/h, pRDI 43.2/h, mean SpO2 of 86%, O2 sudheer 74% and less than 88% for 334.2 minutes of evaluation time. LILIANA 25.9/h. Average heart rate 56 bpm     ROS: As per HPI and otherwise negative if not stated.    Past Medical History:   Diagnosis Date    Adverse effect of anesthesia 2017    Bradycardiac with gallbladder surgery    Anesthesia     PONV    Aortic valve sclerosis     MILD-without arotic stenosis per echo report 7/6/22    Apnea, sleep     Arrhythmia     Afib- Follows with Dr FABI Navarro @ [facility]    Arthritis     osteo    Asthma     daily inhalor    Atrial fibrillation (HCC)     Back pain     Gavin esophagus     taking  omeprazole    Bronchitis     Chickenpox     Cough     Diabetes (HCC)     Pre-Diabetes-does not check glucose at home    GERD (gastroesophageal reflux disease)     Heart burn     taking omeprazole    Heartburn     High cholesterol     Hypertension     Indigestion     taking omeprazole    Left knee pain 09/20/2023    Morning headache     Mumps     Obesity     Osteoarthritis     Painful joint     Palpitations     Pneumonia     PONV (postoperative nausea and vomiting)     with codiene and opiates    Rhinitis     Right knee pain 03/07/2023    Seasonal allergies     Sleep apnea     Wears glasses     Wheezing        Past Surgical History:   Procedure Laterality Date    PB MANIPULATN KNEE JT+ANESTHESIA Left 01/23/2024    Procedure: LEFT KNEE MANIPULATION;  Surgeon: Jarrell Harris M.D.;  Location: [facility];  Service: Orthopedics    PB TOTAL KNEE ARTHROPLASTY Left 10/18/2023    Procedure: LEFT TOTAL KNEE ARTHROPLASTY - KYLEE;  Surgeon: Jarrell Harris M.D.;  Location: [facility];  Service: Ortho Robotic    PB TOTAL KNEE ARTHROPLASTY  05/10/2023    Procedure: RIGHT TOTAL KNEE ARTHROPLASTY;  Surgeon: Jarrell Harris M.D.;  Location: [facility];  Service: Orthopedics    PB KNEE SCOPE,MED/LAT MENISECTOMY Right 08/02/2022    Procedure: RIG KNEE ARTHROSCOPY MEDIAL MENISCECTOMY, REPAIRS AS INDICATED;  Surgeon: Jarrell Harris M.D.;  Location: [facility];  Service: Orthopedics    NJ LAP REMOVE ADJUST NATIVIDAD RESTRICT D* N/A 04/21/2022    Procedure: REMOVAL, GASTRIC BAND AND PORT, LAPAROSCOPIC;  Surgeon: Driss Louis M.D.;  Location: [facility];  Service: General    NJ UPPER GI ENDOSCOPY,DIAGNOSIS  06/03/2021    Procedure: GASTROSCOPY;  Surgeon: Jaime Starr M.D.;  Location: [facility];  Service: EUS    EGD W/ENDOSCOPIC ULTRASOUND  06/03/2021    Procedure: EGD, WITH ENDOSCOPIC US;  Surgeon: Jaime Starr M.D.;  Location: [facility];  Service: EUS    EGD WITH ASP/BX  06/03/2021    Procedure: EGD, WITH ASPIRATION BIOPSY;  Surgeon: Jaime Starr M.D.;  Location: [facility];  Service: EUS    SHOULDER ARTHROSCOPY Left 2019    SHOULDER ARTHROSCOPY Right 2018    CHOLECYSTECTOMY  2017    GASTRIC BANDING LAPAROSCOPIC  2008    OTHER ORTHOPEDIC SURGERY Right 1998    tarsal fusion     ARTHROSCOPY, KNEE      OTHER ORTHOPEDIC SURGERY Right     foot surgery    SLEEVE,LILLIE VASO THIGH      TONSILLECTOMY      as a child       Family History   Problem Relation Age of Onset    Lung Disease Mother     Hypertension Mother     Lung Cancer Mother     Lung Disease Father     Cancer Father     Hypertension Father     Colon Cancer Father         Colon cancer,    Lung Cancer Father        Social History     Socioeconomic History    Marital status:      Spouse name: Not on file    Number of children: Not on file    Years of education: Not on file    Highest education level: Not on file   Occupational History    Not on file   Tobacco Use    Smoking status: Never    Smokeless tobacco: Never   Vaping Use    Vaping status: Never Used   Substance and Sexual Activity    Alcohol use: Not Currently    Drug use: Not Currently    Sexual activity: Not on file   Other Topics Concern    Not on file   Social History Narrative    Not on file     Social Determinants of Health     Financial Resource Strain: Not on file   Food Insecurity: Not on file   Transportation Needs: Not on file   Physical Activity: Not on file   Stress: Not on file   Social Connections: Not on file   Intimate Partner Violence: Not on file   Housing Stability: Not on file       Allergies as of 09/30/2024 - Reviewed 09/30/2024   Allergen Reaction Noted    Soap Shortness of Breath 04/21/2022    Clindamycin Rash and Vomiting 05/17/2023    Codeine Itching, Nausea, Unspecified, and Vomiting 04/18/2022    Hydrocodone Vomiting and Nausea 03/02/2021    Oxycodone Itching and Nausea 01/22/2019  "       Vitals:  /84 (BP Location: Left arm, Patient Position: Sitting, BP Cuff Size: Adult)   Pulse 75   Resp 16   Ht 1.753 m (5' 9\")   Wt 123 kg (272 lb)   SpO2 95%     Current medications as of today   Current Outpatient Medications   Medication Sig Dispense Refill    Tirzepatide (MOUNJARO) 7.5 MG/0.5ML Solution Pen-injector inject 10 mg Subcutaneous weekly for 90 days      amLODIPine (NORVASC) 2.5 MG Tab Take 2.5 mg by mouth every day.      budesonide-formoterol (BREYNA) 160-4.5 MCG/ACT Aerosol USE ONE PUFF EVERY 12 HOURS FOR SHORTNESS OF BREATH 10.2 Each 1    Bempedoic Acid-Ezetimibe (NEXLIZET) 180-10 MG Tab Take  by mouth.      montelukast (SINGULAIR) 10 MG Tab TAKE 1 TABLET BY MOUTH IN THE EVENING 90 Tablet 3    Azelastine (ASTELIN) 137 MCG/SPRAY Solution Administer 2 Sprays into affected nostril(S) 2 times a day.      Cholecalciferol (VITAMIN D3) 125 MCG (5000 UT) Cap Take 5,000 Units by mouth.      rivaroxaban (XARELTO) 20 MG Tab tablet 1 tablet with food Orally Once a day      traZODone (DESYREL) 100 MG Tab Take  mg by mouth at bedtime as needed.      famotidine (PEPCID) 20 MG Tab Take 20 mg by mouth every evening.      omeprazole (PRILOSEC) 20 MG delayed-release capsule Take 20 mg by mouth every morning.      zinc gluconate 50 MG Tab tablet Take 50 mg by mouth. TWICE PER WEEK      metoprolol SR (TOPROL XL) 25 MG TABLET SR 24 HR Take 12.5 mg by mouth 2 times a day.      albuterol (PROAIR HFA) 108 (90 Base) MCG/ACT Aero Soln inhalation aerosol Inhale 2 Puffs every 6 hours as needed for Shortness of Breath. 25.5 g 4    Coenzyme Q10 (CO Q-10) 200 MG Cap Take 200 mg by mouth every day.      Cyanocobalamin (VITAMIN B-12 PO) Take 1 Tablet by mouth every day.      Magnesium 400 MG Cap Take 1 capsule by mouth every day.      MOUNJARO 5 MG/0.5ML Solution Pen-injector inject 5 mg SC weekly Subcutaneous weekly for 90 days      lidocaine (ASPERFLEX) 4 % Patch Place 1 Patch on the skin 1 time a day as " needed (As directed on package). 10 Patch 0    acetaminophen (TYLENOL) 650 MG CR tablet Take 1,300 mg by mouth every day.       No current facility-administered medications for this visit.         Physical Exam:   Gen:           Alert and oriented, No apparent distress. Mood and affect appropriate, normal interaction with examiner.  Eyes:          PERRL, EOM intact, sclere white, conjunctive moist.  Ears:          Not examined.   Hearing:     Grossly intact.  Nose:          Normal, no lesions or deformities.  Dentition:    Not examined.   Oropharynx:   Not examined.   Mallampati Classification: Not examined.   Neck:        Supple, trachea midline, no masses.  Respiratory Effort: No intercostal retractions or use of accessory muscles.   Lung Auscultation:      Clear to auscultation bilaterally; no rales, rhonchi or wheezing.  CV:            A. Fib - chronic. No murmurs, rubs or gallops.  Abd:           Not examined.   Lymphadenopathy: Not examined.   Gait and Station: Normal.  Digits and Nails: No clubbing, cyanosis, petechiae, or nodes.   Cranial Nerves: II-XII grossly intact.  Skin:        No rashes, lesions or ulcers noted.               Ext:           No cyanosis or edema.      Assessment:  1. Mild persistent asthma without complication        2. BMI 40.0-44.9, adult ([facility])        3. Nonsmoker                 Immunizations:    Flu:given today  Pneumovax 23:2021  Prevnar 13:not due  PCV 20: not due  COVID-19: 2024    Plan:  Asthma is mild but stable.  She has recovered fully from pneumonia exacerbation in July.  Refills provided.  Flu shot given   Recommend scheduling sleep study and f/u with sleep medicine after study  Encourage weight loss healthy diet and activity.  Follow-up in 6 mos to review asthma, sooner if needed.    Please note that this dictation was created using voice recognition software. I have made every reasonable attempt to correct obvious errors, but it is possible there are errors of grammar and  possibly content that I did not discover before finalizing the note.

## 2024-10-24 ENCOUNTER — HOSPITAL ENCOUNTER (OUTPATIENT)
Dept: RADIOLOGY | Facility: MEDICAL CENTER | Age: 69
End: 2024-10-24
Attending: PHYSICIAN ASSISTANT
Payer: COMMERCIAL

## 2024-10-24 DIAGNOSIS — R31.0 GROSS HEMATURIA: ICD-10-CM

## 2024-10-24 PROCEDURE — 74178 CT ABD&PLV WO CNTR FLWD CNTR: CPT

## 2024-10-24 PROCEDURE — 700117 HCHG RX CONTRAST REV CODE 255: Performed by: PHYSICIAN ASSISTANT

## 2024-10-24 RX ADMIN — IOHEXOL 100 ML: 350 INJECTION, SOLUTION INTRAVENOUS at 14:52

## 2024-10-28 ENCOUNTER — TELEPHONE (OUTPATIENT)
Dept: SLEEP MEDICINE | Facility: MEDICAL CENTER | Age: 69
End: 2024-10-28

## 2024-10-28 ENCOUNTER — SLEEP STUDY (OUTPATIENT)
Dept: SLEEP MEDICINE | Facility: MEDICAL CENTER | Age: 69
End: 2024-10-28
Attending: PREVENTIVE MEDICINE
Payer: COMMERCIAL

## 2024-10-28 DIAGNOSIS — G47.33 OSA ON CPAP: ICD-10-CM

## 2024-10-28 DIAGNOSIS — G47.34 NOCTURNAL HYPOXIA: ICD-10-CM

## 2024-10-28 PROCEDURE — 95811 POLYSOM 6/>YRS CPAP 4/> PARM: CPT | Performed by: STUDENT IN AN ORGANIZED HEALTH CARE EDUCATION/TRAINING PROGRAM

## 2024-10-29 DIAGNOSIS — J30.9 ALLERGIC RHINITIS: ICD-10-CM

## 2024-10-29 RX ORDER — MONTELUKAST SODIUM 10 MG/1
TABLET ORAL
Qty: 90 TABLET | Refills: 3 | Status: SHIPPED | OUTPATIENT
Start: 2024-10-29

## 2024-10-29 NOTE — PROCEDURES
Physician:   Patient: THONY ADAM  ID: [id number] Date: 10/28/2024 Exam No.:   MONTAGE: Standard  STUDY TYPE: Treatment  RECORDING TECHNIQUE:   After the scalp was prepared, gold plated electrodes were applied to the scalp according to the International 10-20 System. EEG (electroencephalogram) was continuously monitored from the O1-M2, O2-M1, C3-M2, C4-M1, F3-M2, and F4-M1. EOGs (electrooculograms) were monitored by electrodes placed at the left and right outer canthi. Chin EMG (electromyogram) was monitored by electrodes placed on the mentalis and sub-mentalis muscles. Nasal and oral airflow were monitored using a triple port thermocouple as well as oronasal pressure transducer. Respiratory effort was measured by inductive plethysmography technology employing abdominal and thoracic belts. Blood oxygen saturation and pulse were monitored by pulse oximetry. Heart rhythm was monitored by surface electrocardiogram. Leg EMG was studied using surface electrodes placed on left and right anterior tibialis. A microphone was used to monitor tracheal sounds and snoring. Body position was monitored and documented by technician observation.   SCORING CRITERIA:   A modification of the AASM manual for scoring of sleep and associated events was used. Obstructive apneas were scored by cessation of airflow for at least 10 seconds with continuing respiratory effort. Central apneas were scored by cessation of airflow for at least 10 seconds with no respiratory effort. Hypopneas were scored by a 30% or more reduction in airflow for at least 10 seconds accompanied by arterial oxygen desaturation of 3% or an arousal. For CMS (Medicare) patients, per AASM rule 1B, hypopneas are scored by 30% with mild reduction in airflow for at least 10 seconds accompanied by arterial saturation decreased at 4%.  Study start time was 10:12:38 PM. Diagnostic recording time was 7h 6.0m with a total sleep time of 3h 56.5m resulting in a sleep efficiency of  55.52%%. Sleep latency from the start of the study was 39 minutes and the latency from sleep to REM was 166 minutes. In total,21 arousals were scored for an arousal index of 5.3.  Respiratory:  There were a total of 5 apneas consisting of 0 obstructive apneas, 0 mixed apneas, and 5 central apneas. A total of 36 hypopneas were scored. The apnea index was 1.27 per hour and the hypopnea index was 9.13 per hour resulting in an overall AHI of 10.40. AHI during REM was 7.0 and AHI while supine was 7.23.  Oximetry:  There was a mean oxygen saturation of 92.0%. The minimum oxygen saturation in NREM was 86.0 % and in REM was 87.0%. The patient spent 7.7 minutes of TST with SaO2 <88%.  Cardiac:  The highest heart rate seen while awake was 103 BPM while the highest heart rate during sleep was 70 BPM with an average sleeping heart rate of 60 BPM.  Limb Movements:  There were a total of 17 PLMs during sleep which resulted in a PLMS index of 4.3. Of these, 0 were associated with arousals which resulted in a PLMS arousal index of 0.0.  Titration: CPAP was tried from 6-14cm H2O. Bipap was tried at 16/12cm H2O.  This was a fully attended sleep study. This test was technically adequate. This patient was titrated on CPAP starting at *** cm of water pressure. Patient was titrated up to *** cm of water pressure. Patient did best at *** cm of water pressure. Patient spent *** minutes at that pressure and the AHI was *** which is considered *** obstructive sleep apnea.   Assessment:   ***Obstructive Sleep Apnea Hypopnea - AHI*** ***Nocturnal desaturation - sudheer saturation ***% - saturations <88% below for *** minutes of TST.  Recommendation:    measure leak, apnea hypopnea index and compliance for further outpatient monitoring and management of PAP therapy. In some cases alternative treatment options may be proven effective in resolving sleep apnea. These options include upper airway surgery, the use of a dental orthotic, weight loss, or positional therapy. Clinical correlation is required. In general patients with sleep apnea are advised to avoid alcohol, sedatives and not to operate a motor vehicle while drowsy.  Untreated sleep apnea increases the risk for cardiovascular and neurovascular disease.

## 2024-11-14 ENCOUNTER — OFFICE VISIT (OUTPATIENT)
Dept: SLEEP MEDICINE | Facility: MEDICAL CENTER | Age: 69
End: 2024-11-14
Attending: NURSE PRACTITIONER
Payer: COMMERCIAL

## 2024-11-14 VITALS
WEIGHT: 270 LBS | OXYGEN SATURATION: 92 % | DIASTOLIC BLOOD PRESSURE: 72 MMHG | SYSTOLIC BLOOD PRESSURE: 122 MMHG | HEIGHT: 68 IN | BODY MASS INDEX: 40.92 KG/M2 | HEART RATE: 64 BPM | RESPIRATION RATE: 18 BRPM

## 2024-11-14 DIAGNOSIS — G47.33 OSA ON CPAP: ICD-10-CM

## 2024-11-14 PROCEDURE — 3074F SYST BP LT 130 MM HG: CPT | Performed by: NURSE PRACTITIONER

## 2024-11-14 PROCEDURE — 99214 OFFICE O/P EST MOD 30 MIN: CPT | Performed by: NURSE PRACTITIONER

## 2024-11-14 PROCEDURE — 3078F DIAST BP <80 MM HG: CPT | Performed by: NURSE PRACTITIONER

## 2024-11-14 PROCEDURE — 99213 OFFICE O/P EST LOW 20 MIN: CPT | Performed by: NURSE PRACTITIONER

## 2024-11-14 ASSESSMENT — FIBROSIS 4 INDEX: FIB4 SCORE: 1.25

## 2024-11-14 NOTE — PROGRESS NOTES
Chief Complaint   Patient presents with    Apnea     Last Office Visit 9/30/24 with MORENITA Parrish.    Sleep Study Completed on 10/28/24    Polysomnography Titration    PAP/O2/OAT:  CPAP 5-15cm         HPI:  Sara Knight is a 68 y.o. year old female here today for follow-up on titration study results.  Last seen 9/30/2024.  Patient had multiple overnight pulse oximetry's in the past which were abnormal.  Patient was previously on auto CPAP 5 to 15 cm/H2O.  He is currently using a nasal mask with chinstrap.  Was recommended to switch to a hybrid fullface mask due to mouth opening when sleeping.    30-day compliance reviewed shows 97% use with an average time of 8 hours and 8 minutes with patient being on auto CPAP 5 to 15 cm/H2O.  Median pressure of 6.8 and a maximum of 12.3 cm/H2O.      PSG titration (10/28/2024):  1.  Patient is CPAP and BiPAP during night of study  2.  BiPAP was tried at end of study  3.  Respiratory events improved with CPAP therapy  4.  Oxygen saturations improved with PAP therapy  5.  No supine REM sleep seen during night of study     Recommendations:  I recommend auto CPAP 7-14 cmH2O.  Patient used a medium AirFit F20 mask during night of study.    ROS: As per HPI and otherwise negative if not stated.    Past Medical History:   Diagnosis Date    Adverse effect of anesthesia 2017    Bradycardiac with gallbladder surgery    Anesthesia     PONV    Aortic valve sclerosis     MILD-without arotic stenosis per echo report 7/6/22    Apnea, sleep     Arrhythmia     Afib- Follows with Dr FABI Navarro @ [facility]    Arthritis     osteo    Asthma     daily inhalor    Atrial fibrillation (HCC)     Back pain     Gavin esophagus     taking omeprazole    Bronchitis     Chickenpox     Cough     Diabetes (HCC)     Pre-Diabetes-does not check glucose at home    GERD (gastroesophageal reflux disease)     Heart burn     taking omeprazole    Heartburn     High cholesterol      Hypertension     Indigestion     taking omeprazole    Left knee pain 09/20/2023    Morning headache     Mumps     Obesity     Osteoarthritis     Painful joint     Palpitations     Pneumonia     PONV (postoperative nausea and vomiting)     with codiene and opiates    Rhinitis     Right knee pain 03/07/2023    Seasonal allergies     Sleep apnea     Wears glasses     Wheezing        Past Surgical History:   Procedure Laterality Date    PB MANIPULATN KNEE JT+ANESTHESIA Left 01/23/2024    Procedure: LEFT KNEE MANIPULATION;  Surgeon: Jarrell Harris M.D.;  Location: [facility];  Service: Orthopedics    PB TOTAL KNEE ARTHROPLASTY Left 10/18/2023    Procedure: LEFT TOTAL KNEE ARTHROPLASTY - KYLEE;  Surgeon: Jarrell Harris M.D.;  Location: [facility];  Service: Ortho Robotic    PB TOTAL KNEE ARTHROPLASTY  05/10/2023    Procedure: RIGHT TOTAL KNEE ARTHROPLASTY;  Surgeon: Jarrell Harris M.D.;  Location: [facility];  Service: Orthopedics    PB KNEE SCOPE,MED/LAT MENISECTOMY Right 08/02/2022    Procedure: RIGH KNEE ARTHROSCOPY MEDIAL MENISCECTOMY, REPAIRS AS INDICATED;  Surgeon: Jarrell Harris M.D.;  Location: [facility];  Service: Orthopedics    KY LAP REMOVE ADJUST NATIVIDAD RESTRICT D* N/A 04/21/2022    Procedure: REMOVAL, GASTRIC BAND AND PORT, LAPAROSCOPIC;  Surgeon: Driss Louis M.D.;  Location: [facility];  Service: General    KY UPPER GI ENDOSCOPY,DIAGNOSIS  06/03/2021    Procedure: GASTROSCOPY;  Surgeon: Jaime Starr M.D.;  Location: [facility];  Service: EUS    EGD W/ENDOSCOPIC ULTRASOUND  06/03/2021    Procedure: EGD, WITH ENDOSCOPIC US;  Surgeon: Jaime Starr M.D.;  Location: [facility];  Service: EUS    EGD WITH ASP/BX  06/03/2021    Procedure: EGD, WITH ASPIRATION BIOPSY;  Surgeon: Jaime Starr M.D.;  Location: [facility];  Service: EUS    SHOULDER ARTHROSCOPY Left 2019    SHOULDER ARTHROSCOPY  "Right 2018    CHOLECYSTECTOMY  2017    GASTRIC BANDING LAPAROSCOPIC  2008    OTHER ORTHOPEDIC SURGERY Right 1998    tarsal fusion     ARTHROSCOPY, KNEE      OTHER ORTHOPEDIC SURGERY Right     foot surgery    SLEEVE,LILLIE VASO THIGH      TONSILLECTOMY      as a child       Family History   Problem Relation Age of Onset    Lung Disease Mother     Hypertension Mother     Lung Cancer Mother     Lung Disease Father     Cancer Father     Hypertension Father     Colon Cancer Father         Colon cancer,    Lung Cancer Father        Allergies as of 11/14/2024 - Reviewed 11/14/2024   Allergen Reaction Noted    Soap Shortness of Breath 04/21/2022    Clindamycin Rash and Vomiting 05/17/2023    Codeine Itching, Nausea, Unspecified, and Vomiting 04/18/2022    Hydrocodone Vomiting and Nausea 03/02/2021    Oxycodone Itching and Nausea 01/22/2019        Vitals:  /72 (BP Location: Left arm, Patient Position: Sitting, BP Cuff Size: Large adult)   Pulse 64   Resp 18   Ht 1.727 m (5' 8\")   Wt 122 kg (270 lb)   SpO2 92%     Current medications as of today   Current Outpatient Medications   Medication Sig Dispense Refill    montelukast (SINGULAIR) 10 MG Tab TAKE 1 TABLET BY MOUTH IN THE EVENING 90 Tablet 3    albuterol (PROAIR HFA) 108 (90 Base) MCG/ACT Aero Soln inhalation aerosol Inhale 2 Puffs every 6 hours as needed for Shortness of Breath. 54 g 3    budesonide-formoterol (BREYNA) 160-4.5 MCG/ACT Aerosol USE ONE PUFF EVERY 12 HOURS FOR SHORTNESS OF BREATH 30.6 g 3    Tirzepatide (MOUNJARO) 7.5 MG/0.5ML Solution Pen-injector inject 10 mg Subcutaneous weekly for 90 days      amLODIPine (NORVASC) 2.5 MG Tab Take 2.5 mg by mouth every day.      Bempedoic Acid-Ezetimibe (NEXLIZET) 180-10 MG Tab Take  by mouth.      lidocaine (ASPERFLEX) 4 % Patch Place 1 Patch on the skin 1 time a day as needed (As directed on package). 10 Patch 0    Azelastine (ASTELIN) 137 MCG/SPRAY Solution Administer 2 Sprays into affected nostril(S) 2 times " a day.      Cholecalciferol (VITAMIN D3) 125 MCG (5000 UT) Cap Take 5,000 Units by mouth.      rivaroxaban (XARELTO) 20 MG Tab tablet 1 tablet with food Orally Once a day      acetaminophen (TYLENOL) 650 MG CR tablet Take 1,300 mg by mouth every day.      traZODone (DESYREL) 100 MG Tab Take  mg by mouth at bedtime as needed.      famotidine (PEPCID) 20 MG Tab Take 20 mg by mouth every evening.      omeprazole (PRILOSEC) 20 MG delayed-release capsule Take 20 mg by mouth every morning.      zinc gluconate 50 MG Tab tablet Take 50 mg by mouth. TWICE PER WEEK      metoprolol SR (TOPROL XL) 25 MG TABLET SR 24 HR Take 12.5 mg by mouth 2 times a day.      Coenzyme Q10 (CO Q-10) 200 MG Cap Take 200 mg by mouth every day.      Cyanocobalamin (VITAMIN B-12 PO) Take 1 Tablet by mouth every day.      Magnesium 400 MG Cap Take 1 capsule by mouth every day.      MOUNJARO 5 MG/0.5ML Solution Pen-injector inject 5 mg SC weekly Subcutaneous weekly for 90 days       No current facility-administered medications for this visit.         Physical Exam:   Gen:           Alert and oriented, No apparent distress. Mood and affect appropriate, normal interaction with examiner.  Eyes:          PERRL, EOM intact, sclere white, conjunctive moist.  Ears:          Not examined.   Hearing:     Grossly intact.  Nose:          Normal, no lesions or deformities.  Dentition:    Good dentition.  Oropharynx:   Tongue normal, posterior pharynx without erythema or exudate.  Neck:        Supple, trachea midline, no masses.  Respiratory Effort: No intercostal retractions or use of accessory muscles.   Lung Auscultation:      Clear to auscultation bilaterally; no rales, rhonchi or wheezing.  CV:            Regular rate and rhythm. No murmurs, rubs or gallops.  Abd:           Not examined.   Lymphadenopathy: Not examined.  Gait and Station: Normal.  Digits and Nails: No clubbing, cyanosis, petechiae, or nodes.   Cranial Nerves: II-XII grossly  intact.  Skin:        No rashes, lesions or ulcers noted.               Ext:           No cyanosis or edema.      Assessment:  1. ZURDO on CPAP          Plan:  Adjusted to auto CPAP 11 to 15 cm/H2O due to doing better with oxygenation at those higher pressures.  Patient advised to follow-up in approximately 3 months for compliance reassessment and will order repeat OPO.  Once stable and tolerable of CPAP.    Please note that this dictation was created using voice recognition software. I have made every reasonable attempt to correct obvious errors, but it is possible there are errors of grammar and possibly content that I did not discover before finalizing the note.

## 2024-12-17 ENCOUNTER — APPOINTMENT (OUTPATIENT)
Dept: URBAN - METROPOLITAN AREA CLINIC 6 | Facility: CLINIC | Age: 69
Setting detail: DERMATOLOGY
End: 2024-12-17

## 2024-12-17 DIAGNOSIS — D18.0 HEMANGIOMA: ICD-10-CM

## 2024-12-17 DIAGNOSIS — L82.1 OTHER SEBORRHEIC KERATOSIS: ICD-10-CM

## 2024-12-17 DIAGNOSIS — L85.3 XEROSIS CUTIS: ICD-10-CM

## 2024-12-17 DIAGNOSIS — D22 MELANOCYTIC NEVI: ICD-10-CM

## 2024-12-17 DIAGNOSIS — L57.0 ACTINIC KERATOSIS: ICD-10-CM

## 2024-12-17 DIAGNOSIS — Z85.828 PERSONAL HISTORY OF OTHER MALIGNANT NEOPLASM OF SKIN: ICD-10-CM

## 2024-12-17 DIAGNOSIS — Z71.89 OTHER SPECIFIED COUNSELING: ICD-10-CM

## 2024-12-17 DIAGNOSIS — L81.4 OTHER MELANIN HYPERPIGMENTATION: ICD-10-CM

## 2024-12-17 PROBLEM — D22.61 MELANOCYTIC NEVI OF RIGHT UPPER LIMB, INCLUDING SHOULDER: Status: ACTIVE | Noted: 2024-12-17

## 2024-12-17 PROBLEM — D22.71 MELANOCYTIC NEVI OF RIGHT LOWER LIMB, INCLUDING HIP: Status: ACTIVE | Noted: 2024-12-17

## 2024-12-17 PROBLEM — D48.5 NEOPLASM OF UNCERTAIN BEHAVIOR OF SKIN: Status: ACTIVE | Noted: 2024-12-17

## 2024-12-17 PROBLEM — D22.62 MELANOCYTIC NEVI OF LEFT UPPER LIMB, INCLUDING SHOULDER: Status: ACTIVE | Noted: 2024-12-17

## 2024-12-17 PROBLEM — D22.5 MELANOCYTIC NEVI OF TRUNK: Status: ACTIVE | Noted: 2024-12-17

## 2024-12-17 PROBLEM — D18.01 HEMANGIOMA OF SKIN AND SUBCUTANEOUS TISSUE: Status: ACTIVE | Noted: 2024-12-17

## 2024-12-17 PROBLEM — D22.72 MELANOCYTIC NEVI OF LEFT LOWER LIMB, INCLUDING HIP: Status: ACTIVE | Noted: 2024-12-17

## 2024-12-17 PROCEDURE — ? BIOPSY BY SHAVE METHOD

## 2024-12-17 PROCEDURE — 17003 DESTRUCT PREMALG LES 2-14: CPT

## 2024-12-17 PROCEDURE — ? COUNSELING

## 2024-12-17 PROCEDURE — 11102 TANGNTL BX SKIN SINGLE LES: CPT

## 2024-12-17 PROCEDURE — 99213 OFFICE O/P EST LOW 20 MIN: CPT | Mod: 25

## 2024-12-17 PROCEDURE — ? LIQUID NITROGEN

## 2024-12-17 PROCEDURE — 17000 DESTRUCT PREMALG LESION: CPT | Mod: 59

## 2024-12-17 ASSESSMENT — LOCATION DETAILED DESCRIPTION DERM
LOCATION DETAILED: NASAL SUPRATIP
LOCATION DETAILED: RIGHT MEDIAL INFERIOR CHEST
LOCATION DETAILED: LEFT VENTRAL DISTAL FOREARM
LOCATION DETAILED: LEFT CENTRAL MALAR CHEEK
LOCATION DETAILED: LEFT ANTERIOR DISTAL UPPER ARM
LOCATION DETAILED: LEFT DISTAL DORSAL FOREARM
LOCATION DETAILED: LEFT PROXIMAL CALF
LOCATION DETAILED: RIGHT PROXIMAL CALF
LOCATION DETAILED: LEFT ANTERIOR PROXIMAL THIGH
LOCATION DETAILED: LEFT PROXIMAL DORSAL FOREARM
LOCATION DETAILED: NASAL DORSUM
LOCATION DETAILED: RIGHT VENTRAL PROXIMAL FOREARM
LOCATION DETAILED: LEFT VENTRAL PROXIMAL FOREARM
LOCATION DETAILED: NASAL ROOT
LOCATION DETAILED: LEFT DISTAL POSTERIOR THIGH
LOCATION DETAILED: RIGHT MID-UPPER BACK
LOCATION DETAILED: RIGHT DISTAL CALF
LOCATION DETAILED: RIGHT SUPERIOR LATERAL MIDBACK
LOCATION DETAILED: RIGHT ANTERIOR PROXIMAL THIGH
LOCATION DETAILED: RIGHT LATERAL PROXIMAL UPPER ARM
LOCATION DETAILED: RIGHT VENTRAL DISTAL FOREARM
LOCATION DETAILED: LEFT INFERIOR UPPER BACK
LOCATION DETAILED: RIGHT DISTAL POSTERIOR THIGH
LOCATION DETAILED: RIGHT PROXIMAL DORSAL FOREARM
LOCATION DETAILED: RIGHT ANTERIOR DISTAL UPPER ARM

## 2024-12-17 ASSESSMENT — LOCATION SIMPLE DESCRIPTION DERM
LOCATION SIMPLE: NOSE
LOCATION SIMPLE: LEFT CALF
LOCATION SIMPLE: CHEST
LOCATION SIMPLE: RIGHT UPPER BACK
LOCATION SIMPLE: RIGHT POSTERIOR THIGH
LOCATION SIMPLE: LEFT UPPER BACK
LOCATION SIMPLE: LEFT UPPER ARM
LOCATION SIMPLE: RIGHT FOREARM
LOCATION SIMPLE: LEFT POSTERIOR THIGH
LOCATION SIMPLE: LEFT FOREARM
LOCATION SIMPLE: LEFT CHEEK
LOCATION SIMPLE: RIGHT CALF
LOCATION SIMPLE: RIGHT UPPER ARM
LOCATION SIMPLE: RIGHT THIGH
LOCATION SIMPLE: LEFT THIGH
LOCATION SIMPLE: RIGHT LOWER BACK

## 2024-12-17 ASSESSMENT — LOCATION ZONE DERM
LOCATION ZONE: FACE
LOCATION ZONE: ARM
LOCATION ZONE: NOSE
LOCATION ZONE: LEG
LOCATION ZONE: TRUNK

## 2024-12-17 NOTE — PROCEDURE: BIOPSY BY SHAVE METHOD
Detail Level: Detailed
Depth Of Biopsy: dermis
Was A Bandage Applied: Yes
Size Of Lesion In Cm: 0.4
X Size Of Lesion In Cm: 0
Biopsy Type: H and E
Biopsy Method: Dermablade
Anesthesia Type: 1% lidocaine with epinephrine
Anesthesia Volume In Cc: 0.5
Hemostasis: Drysol
Wound Care: Petrolatum
Dressing: bandage
Destruction After The Procedure: No
Type Of Destruction Used: Electrodesiccation and Curettage
Curettage Text: The wound bed was treated with curettage after the biopsy was performed.
Cryotherapy Text: The wound bed was treated with cryotherapy after the biopsy was performed.
Electrodesiccation Text: The wound bed was treated with electrodesiccation after the biopsy was performed.
Electrodesiccation And Curettage Text: The wound bed was treated with electrodesiccation and curettage after the biopsy was performed. Treated x 3
Silver Nitrate Text: The wound bed was treated with silver nitrate after the biopsy was performed.
Lab: [id number]
Lab Facility: 
Medical Necessity Information: It is in your best interest to select a reason for this procedure from the list below. All of these items fulfill various CMS LCD requirements except the new and changing color options.
Consent: Written consent was obtained and risks were reviewed including but not limited to scarring, infection, bleeding, scabbing, incomplete removal, nerve damage and allergy to anesthesia.
Post-Care Instructions: I reviewed with the patient in detail post-care instructions. Patient is to keep the biopsy site dry overnight, and then apply bacitracin twice daily until healed. Patient may apply hydrogen peroxide soaks to remove any crusting.
Notification Instructions: Patient will be notified of biopsy results. However, patient instructed to call the office if not contacted within 2 weeks.
Billing Type: Third-Party Bill
Information: Selecting Yes will display possible errors in your note based on the variables you have selected. This validation is only offered as a suggestion for you. PLEASE NOTE THAT THE VALIDATION TEXT WILL BE REMOVED WHEN YOU FINALIZE YOUR NOTE. IF YOU WANT TO FAX A PRELIMINARY NOTE YOU WILL NEED TO TOGGLE THIS TO 'NO' IF YOU DO NOT WANT IT IN YOUR FAXED NOTE.

## 2024-12-17 NOTE — PROCEDURE: LIQUID NITROGEN
Post-Care Instructions: I reviewed with the patient in detail post-care instructions. Patient is to wear sunprotection, and avoid picking at any of the treated lesions. Pt may apply Vaseline to crusted or scabbing areas.
Render Post-Care Instructions In Note?: no
Duration Of Freeze Thaw-Cycle (Seconds): 0
Show Aperture Variable?: Yes
Detail Level: Simple
Consent: The patient's consent was obtained including but not limited to risks of crusting, scabbing, blistering, scarring, darker or lighter pigmentary change, recurrence, incomplete removal and infection.

## 2025-01-13 NOTE — LETTER
LAUREN Parrish  [facility] Pulmonary Medicine - Operated by [facility]   [address], [address] [address]  Phone: [phone] - Fax: [phone]           Encounter Date: 3/5/2024  Provider: LAUREN Parrish  Location of Care: [facility] PULMONARY MEDICINE - OPERATED BY [facility]  [address] [facility] [place] [address] [phone]      Patient:   Sara Knight   MR Number: [id number]   YOB: 1955     PROGRESS NOTE:  Chief Complaint   Patient presents with   • Follow-Up     Asthma // Last Seen 12/5/2023        HPI:  Sara Knight is a 68 y.o. year old female here today for follow-up on mild persistent asthma.  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 Fib on Xarelto.  Last OV 12/5/23 Maxime Cedillo APRN     MMRC rdGrdrrdarddrderd:rd rd3rd Exacerbations this year: 1? January sinus infection vs bronchitis?    Currently prescribed Symbicort 80/4.5mcg 2 puffs BID, singulair qhs and albuterol hfa prn.    Interval Events:  She notes in the last 2 months having increased shortness of breath emergency room a sinus infection that may have turned into bronchitis with head congestion and cough.  She was treated with doxycycline x 7 days and minimal improvement in symptoms.  She returned to GP and placed on cefdinir x 10 days which she finished 3 days ago with some improvement in symptoms but shortness of breath continues.  She has noted occasional wheezing.  She saw cardiology in follow-up and is pending a stress test and recent Holter monitor testing.  She also notes being anemic with [facility] that I do not have to review.  She uses ProAir prior to Symbicort use but has not required further use of it than that.  She notes due to increased shortness of breath she is also had episodes of anxiety and actually woke at 1 AM with palpitations, nausea and sweats and cold REMSA  who noted normal EKG and because symptoms improved she was not transported to hospital.  She was recently seen by GI doctor in [facility] and endoscopy performed for Gavin's which noted improvement.  Due to increased palpitations in last 2 months her metoprolol dosing was increased to 25 mg.  She has never had overnight oximetry or sleep apnea testing performed and her  states she does not snore.  She does note having regular morning headaches but no regular napping.  Her A1c did increase so she was placed on Mounjaro and is received 3 injections so far.  Due to 2 knee replacements she has not been as active in the last year and notes being deconditioned.  She is avoiding caffeine and alcohol.  She also tried stopping Nexlizet x 3 weeks to see if her shortness of breath would improve but it did not so she resumed therapy.  Is concerned over ongoing shortness of breath with no clear etiology.    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.  Multiple environmental occupational exposures with history of first asthma attack in 1990s while at work.  Managed well with inhaled corticosteroids and long-acting beta agonist.  Both of her parents suffered from lung cancer but heavy tobacco users.  Father had colon cancer.  Spirometry 8/5/2021 notes FEV1 1.88 L 70% ratio 70%.  Mild obstruction noted.  Chest x-ray 7/21/2022 noted clear lungs, normal heart size and degenerative changes in thoracic spine.    ROS: As per HPI and otherwise negative if not stated.    Past Medical History:   Diagnosis Date   • Adverse effect of anesthesia 2017    Bradycardiac with gallbladder surgery   • Anesthesia     PONV   • Aortic valve sclerosis     MILD-without arotic stenosis per echo report 7/6/22   • Arrhythmia     Afib- Follows with Dr FABI Navarro @ [facility]   • Arthritis     osteo   • Asthma     daily inhalor   • Gavin esophagus     taking omeprazole   •  Diabetes (HCC)     Pre-Diabetes-does not check glucose at home   • GERD (gastroesophageal reflux disease)    • Heart burn     taking omeprazole   • High cholesterol    • Hypertension    • Indigestion     taking omeprazole   • Left knee pain 09/20/2023   • Osteoarthritis    • PONV (postoperative nausea and vomiting)     with codiene and opiates   • Right knee pain 03/07/2023   • Seasonal allergies        Past Surgical History:   Procedure Laterality Date   • PB MANIPULATN KNEE JT+ANESTHESIA Left 1/23/2024    Procedure: LEFT KNEE MANIPULATION;  Surgeon: Jarrell Harris M.D.;  Location: [facility];  Service: Orthopedics   • PB TOTAL KNEE ARTHROPLASTY Left 10/18/2023    Procedure: LEFT TOTAL KNEE ARTHROPLASTY - KYLEE;  Surgeon: Jarrell Harris M.D.;  Location: [facility];  Service: Ortho Robotic   • PB TOTAL KNEE ARTHROPLASTY  5/10/2023    Procedure: RIGHT TOTAL KNEE ARTHROPLASTY;  Surgeon: Jarrell Harris M.D.;  Location: [facility];  Service: Orthopedics   • PB KNEE SCOPE,MED/LAT MENISECTOMY Right 08/02/2022    Procedure: RIGH KNEE ARTHROSCOPY MEDIAL MENISCECTOMY, REPAIRS AS INDICATED;  Surgeon: Jarrell Harris M.D.;  Location: [facility];  Service: Orthopedics   • LAP, REMOVE ADJUST NATIVIDAD RESTRICT D* N/A 04/21/2022    Procedure: REMOVAL, GASTRIC BAND AND PORT, LAPAROSCOPIC;  Surgeon: Driss Louis M.D.;  Location[facility];  Service: General   • DC UPPER GI ENDOSCOPY,DIAGNOSIS  06/03/2021    Procedure: GASTROSCOPY;  Surgeon: Jaime Starr M.D.;  Location: [facility];  Service: EUS   • EGD W/ENDOSCOPIC ULTRASOUND  06/03/2021    Procedure: EGD, WITH ENDOSCOPIC US;  Surgeon: Jaime Starr M.D.;  Location: [facility];  Service: EUS   • EGD WITH ASP/BX  06/03/2021    Procedure: EGD, WITH ASPIRATION BIOPSY;  Surgeon: Jaime Starr M.D.;  Location: [facility];  Service: EUS   • SHOULDER ARTHROSCOPY Left  "2019   • SHOULDER ARTHROSCOPY Right 2018   • CHOLECYSTECTOMY  2017   • GASTRIC BANDING LAPAROSCOPIC  2008   • OTHER ORTHOPEDIC SURGERY Right 1998    tarsal fusion    • OTHER ORTHOPEDIC SURGERY Right     foot surgery   • TONSILLECTOMY      as a child       Family History   Problem Relation Age of Onset   • Lung Disease Mother    • Hypertension Mother    • Lung Disease Father    • Cancer Father    • Hypertension Father        Social History     Socioeconomic History   • Marital status:      Spouse name: Not on file   • Number of children: Not on file   • Years of education: Not on file   • Highest education level: Not on file   Occupational History   • Not on file   Tobacco Use   • Smoking status: Never   • Smokeless tobacco: Never   Vaping Use   • Vaping Use: Never used   Substance and Sexual Activity   • Alcohol use: Not Currently   • Drug use: Never   • Sexual activity: Not on file   Other Topics Concern   • Not on file   Social History Narrative   • Not on file     Social Determinants of Health     Financial Resource Strain: Not on file   Food Insecurity: Not on file   Transportation Needs: Not on file   Physical Activity: Not on file   Stress: Not on file   Social Connections: Not on file   Intimate Partner Violence: Not on file   Housing Stability: Not on file       Allergies as of 03/05/2024 - Reviewed 03/05/2024   Allergen Reaction Noted   • Soap Shortness of Breath 04/21/2022   • Clindamycin Rash and Vomiting 05/17/2023   • Codeine Itching, Nausea, Unspecified, and Vomiting 04/18/2022   • Hydrocodone Vomiting and Nausea 03/02/2021   • Oxycodone Itching and Nausea 01/22/2019        Vitals:  /76 (BP Location: Left arm, Patient Position: Sitting, BP Cuff Size: Adult)   Resp 16   Ht 1.753 m (5' 9\")   Wt 120 kg (264 lb)     Current medications as of today   Current Outpatient Medications   Medication Sig Dispense Refill   • Bempedoic Acid-Ezetimibe (NEXLIZET) 180-10 MG Tab Take  by mouth.     • " montelukast (SINGULAIR) 10 MG Tab TAKE 1 TABLET BY MOUTH IN THE EVENING 90 Tablet 3   • lidocaine (ASPERFLEX) 4 % Patch Place 1 Patch on the skin 1 time a day as needed (As directed on package). 10 Patch 0   • Azelastine (ASTELIN) 137 MCG/SPRAY Solution Administer 2 Sprays into affected nostril(S) 2 times a day.     • Cholecalciferol (VITAMIN D3) 125 MCG (5000 UT) Cap Take 5,000 Units by mouth.     • losartan (COZAAR) 50 MG Tab Take 50 mg by mouth 2 times a day.     • rivaroxaban (XARELTO) 20 MG Tab tablet 1 tablet with food Orally Once a day     • budesonide-formoterol (SYMBICORT) 160-4.5 MCG/ACT Aerosol Symbicort 160 mcg-4.5 mcg/actuation HFA aerosol inhaler (Patient taking differently: Inhale 1 Puff every day. Symbicort 160 mcg-4.5 mcg/actuation HFA aerosol inhaler) 1 Each 5   • acetaminophen (TYLENOL) 650 MG CR tablet Take 1,300 mg by mouth every day.     • traZODone (DESYREL) 100 MG Tab Take  mg by mouth at bedtime as needed.     • famotidine (PEPCID) 20 MG Tab Take 20 mg by mouth every evening.     • omeprazole (PRILOSEC) 20 MG delayed-release capsule Take 20 mg by mouth every morning.     • zinc gluconate 50 MG Tab tablet Take 50 mg by mouth. TWICE PER WEEK     • metoprolol SR (TOPROL XL) 25 MG TABLET SR 24 HR Take 12.5 mg by mouth 2 times a day.     • albuterol (PROAIR HFA) 108 (90 Base) MCG/ACT Aero Soln inhalation aerosol Inhale 2 Puffs every 6 hours as needed for Shortness of Breath. 25.5 g 4   • Coenzyme Q10 (CO Q-10) 200 MG Cap Take 200 mg by mouth every day.     • Cyanocobalamin (VITAMIN B-12 PO) Take 1 Tablet by mouth every day.     • Magnesium 400 MG Cap Take 1 capsule by mouth every day.       No current facility-administered medications for this visit.         Physical Exam:   Gen:           Alert and oriented, No apparent distress. Mood and affect appropriate, normal interaction with examiner.  Eyes:          PERRL, EOM intact, sclere white, conjunctive moist.  Ears:          Not examined.    Hearing:     Grossly intact.  Nose:          Normal, no lesions or deformities.  Dentition:    Not examined.   Oropharynx:   Not examined.   Mallampati Classification: Not examined.   Neck:        Supple, trachea midline, no masses.  Respiratory Effort: No intercostal retractions or use of accessory muscles.   Lung Auscultation:      Clear to auscultation bilaterally; no rales, rhonchi or wheezing.  CV:            Regular rate and rhythm. No murmurs, rubs or gallops.  Abd:           Not examined.   Lymphadenopathy: Not examined.   Gait and Station: Normal.  Digits and Nails: No clubbing, cyanosis, petechiae, or nodes.   Cranial Nerves: II-XII grossly intact.  Skin:        No rashes, lesions or ulcers noted.               Ext:           No cyanosis or edema.      Assessment:  1. Mild persistent asthma without complication  PULMONARY FUNCTION TESTS -Test requested: Complete Pulmonary Function Test; Include MIPS/MEPS? No      2. Sleep-disordered breathing  Overnight Home Sleep Study      3. Gavin's esophagus with dysplasia        4. BMI 38.0-38.9,adult  HEIGHT AND WEIGHT      5. Nonsmoker  PULMONARY FUNCTION TESTS -Test requested: Complete Pulmonary Function Test; Include MIPS/MEPS? No      6. Moderate persistent asthma without complication  budesonide-formoterol (SYMBICORT) 160-4.5 MCG/ACT Aerosol      7. Allergic rhinitis  montelukast (SINGULAIR) 10 MG Tab                 Immunizations:    Flu:recommend  Pneumovax 23:2021  Prevnar 13:2021  PCV 20: not due  COVID-19: 9/27/23    Plan:  Asthma is clinically stable but will icnrease dosing of symbicort to 160mcg in the interim. Will update PFT now.   Refills provided   PFT at next OV  Recommend HST now to r/o sleep apnea due to recent palpitation/A. Fib episode during the middle of the night and general increased SOB.   HST now  Gavin's seems to be well controlled and improved per patient report from GI and symptoms.  Encourage weight loss through diet and  Aklief counseling:  Patient advised to apply a pea-sized amount only at bedtime and wait 30 minutes after washing their face before applying.  If too drying, patient may add a non-comedogenic moisturizer.  The most commonly reported side effects including irritation, redness, scaling, dryness, stinging, burning, itching, and increased risk of sunburn.  The patient verbalized understanding of the proper use and possible adverse effects of retinoids.  All of the patient's questions and concerns were addressed. exercise.  F/u with PCP for other health concerns and cardio for ongoing testing. Consider ECHO if stress test shows no abnormalities.  F/u in 6 weeks to review PFT and HST results, sooner if needed.    Please note that this dictation was created using voice recognition software. I have made every reasonable attempt to correct obvious errors, but it is possible there are errors of grammar and possibly content that I did not discover before finalizing the note.          Electronically signed by SAUL ParrishPBasiliaRBasiliaNBasilia  on 03/05/24    Bhavya Navarro M.D.  [address] [place] [address]  Via Fax: [phone]     Yesi Negrete M.D.  [address] [facility]   [facility] [address]  [place] [address]  Via Fax: [phone]

## 2025-02-13 ENCOUNTER — APPOINTMENT (OUTPATIENT)
Dept: URBAN - METROPOLITAN AREA CLINIC 36 | Facility: CLINIC | Age: 70
Setting detail: DERMATOLOGY
End: 2025-02-13

## 2025-02-13 PROBLEM — C44.319 BASAL CELL CARCINOMA OF SKIN OF OTHER PARTS OF FACE: Status: ACTIVE | Noted: 2025-02-13

## 2025-02-13 PROCEDURE — ? MOHS SURGERY

## 2025-02-13 PROCEDURE — 17311 MOHS 1 STAGE H/N/HF/G: CPT

## 2025-02-13 PROCEDURE — 13132 CMPLX RPR F/C/C/M/N/AX/G/H/F: CPT

## 2025-02-13 NOTE — PROCEDURE: MOHS SURGERY
Consent 2/Introductory Paragraph: Mohs surgery was explained to the patient and consent was obtained. The risks, benefits and alternatives to therapy were discussed in detail. Specifically, the risks of infection, scarring, bleeding, prolonged wound healing, incomplete removal, allergy to anesthesia, nerve injury and recurrence were addressed. Prior to the procedure, the treatment site was clearly identified and confirmed by the patient. All components of Universal Protocol/PAUSE Rule completed.
Did You Provide Opioid Counseling: No
Eyelid Full Thickness Repair - 51493: The eyelid defect was full thickness which required a wedge repair of the eyelid. Special care was taken to ensure that the eyelid margin was realligned when placing sutures.
Stage 4: Additional Anesthesia Type: 1% lidocaine with epinephrine
Where Do You Want The Question To Include Opioid Counseling Located?: Case Summary Tab
Include Size Of Lesion In Location Indication Statement: Yes
Advancement Flap (Single) Text: The defect edges were debeveled with a #15 scalpel blade.  Given the location of the defect and the proximity to free margins a single advancement flap was deemed most appropriate.  Using a sterile surgical marker, an appropriate advancement flap was drawn incorporating the defect and placing the expected incisions within the relaxed skin tension lines where possible.    The area thus outlined was incised deep to adipose tissue with a #15 scalpel blade.  The skin margins were undermined to an appropriate distance in all directions utilizing iris scissors.
Mid-Level Procedure Text (C): After obtaining clear surgical margins the patient was sent to a mid-level provider for surgical repair.  The patient understands they will receive post-surgical care and follow-up from the mid-level provider.
Provider Procedure Text (C): After obtaining clear surgical margins the defect was repaired by another provider.
Special Stains Stage 9 - Results: Base On Clearance Noted Above
Secondary Defect Width In Cm (Required For Flaps): 0
Crescentic Advancement Flap Text: The defect edges were debeveled with a #15 scalpel blade.  Given the location of the defect and the proximity to free margins a crescentic advancement flap was deemed most appropriate.  Using a sterile surgical marker, the appropriate advancement flap was drawn incorporating the defect and placing the expected incisions within the relaxed skin tension lines where possible.    The area thus outlined was incised deep to adipose tissue with a #15 scalpel blade.  The skin margins were undermined to an appropriate distance in all directions utilizing iris scissors.
Bcc Infiltrative Histology Text: There were numerous aggregates of basaloid cells demonstrating an infiltrative pattern.
Split-Thickness Skin Graft Text: The defect edges were debeveled with a #15 scalpel blade.  Given the location of the defect, shape of the defect and the proximity to free margins a split thickness skin graft was deemed most appropriate.  Using a sterile surgical marker, the primary defect shape was transferred to the donor site. The split thickness graft was then harvested.  The skin graft was then placed in the primary defect and oriented appropriately.
Estlander Flap (Upper To Lower Lip) Text: The defect of the lower lip was assessed and measured.  Given the location and size of the defect, an Estlander flap was deemed most appropriate.  Using a sterile surgical marker, an appropriate Estlander flap was measured and drawn on the upper lip. Local anesthesia was then infiltrated. A scalpel was then used to incise the lateral aspect of the flap, through skin, muscle and mucosa, leaving the flap pedicled medially.  The flap was then rotated and positioned to fill the lower lip defect.  The flap was then sutured into place with a three layer technique, closing the orbicularis oris muscle layer with subcutaneous buried sutures, followed by a mucosal layer and an epidermal layer.
Graft Donor Site Dermal Sutures (Optional): 5-0 Polysorb
Xenograft Text: The defect edges were debeveled with a #15 scalpel blade.  Given the location of the defect, shape of the defect and the proximity to free margins a xenograft was deemed most appropriate.  The graft was then trimmed to fit the size of the defect.  The graft was then placed in the primary defect and oriented appropriately.
Bilateral Helical Rim Advancement Flap Text: The defect edges were debeveled with a #15 blade scalpel.  Given the location of the defect and the proximity to free margins (helical rim) a bilateral helical rim advancement flap was deemed most appropriate.  Using a sterile surgical marker, the appropriate advancement flaps were drawn incorporating the defect and placing the expected incisions between the helical rim and antihelix where possible.  The area thus outlined was incised through and through with a #15 scalpel blade.  With a skin hook and iris scissors, the flaps were gently and sharply undermined and freed up.
Wound Care (No Sutures): Petrolatum
Island Pedicle Flap With Canthal Suspension Text: The defect edges were debeveled with a #15 scalpel blade.  Given the location of the defect, shape of the defect and the proximity to free margins an island pedicle advancement flap was deemed most appropriate.  Using a sterile surgical marker, an appropriate advancement flap was drawn incorporating the defect, outlining the appropriate donor tissue and placing the expected incisions within the relaxed skin tension lines where possible. The area thus outlined was incised deep to adipose tissue with a #15 scalpel blade.  The skin margins were undermined to an appropriate distance in all directions around the primary defect and laterally outward around the island pedicle utilizing iris scissors.  There was minimal undermining beneath the pedicle flap. A suspension suture was placed in the canthal tendon to prevent tension and prevent ectropion.
Nasolabial Transposition Flap Text: The defect edges were debeveled with a #15 scalpel blade.  Given the size, depth and location of the defect and the proximity to free margins a nasolabial transposition flap was deemed most appropriate. Using a sterile surgical marker, an appropriate flap was drawn incorporating the defect. The area thus outlined was incised with a #15 scalpel blade. The skin margins were undermined to an appropriate distance in all directions utilizing iris scissors. Following this, the designed flap was carried into the primary defect and sutured into place.
Eyelid Partial Thickness Repair - 15094: The eyelid defect was partial thickness which required a wedge repair of the eyelid. Special care was taken to ensure that the eyelid margin was realligned when placing sutures.
Plastic Surgeon Procedure Text (C): After obtaining clear surgical margins the patient was sent to plastics for surgical repair.  The patient understands they will receive post-surgical care and follow-up from the referring physician's office.
Postop Diagnosis: same
Referred To Asc For Closure Text (Leave Blank If You Do Not Want): After obtaining clear surgical margins the patient was sent to an ASC for surgical repair.  The patient understands they will receive post-surgical care and follow-up from the ASC physician.
Wound Care: Vaseline
Repair Anesthesia Method: local infiltration
Repair Hemostasis (Optional): Pinpoint electrocautery
Mucosal Advancement Flap Text: Given the location of the defect, shape of the defect and the proximity to free margins a mucosal advancement flap was deemed most appropriate. Incisions were made with a 15 blade scalpel in the appropriate fashion along the cutaneous vermilion border and the mucosal lip. The remaining actinically damaged mucosal tissue was excised.  The mucosal advancement flap was then elevated to the gingival sulcus with care taken to preserve the neurovascular structures and advanced into the primary defect. Care was taken to ensure that precise realignment of the vermilion border was achieved.
Was The Patient On Physician Recommended Anticoagulation Therapy?: Please Select the Appropriate Response
Intermediate Repair And Graft Additional Text (Will Appearing After The Standard Complex Repair Text): The intermediate repair was not sufficient to completely close the primary defect. The remaining additional defect was repaired with the graft mentioned below.
Transposition Flap Text: The defect edges were debeveled with a #15 scalpel blade.  Given the location of the defect and the proximity to free margins a transposition flap was deemed most appropriate.  Using a sterile surgical marker, an appropriate transposition flap was drawn incorporating the defect.    The area thus outlined was incised deep to adipose tissue with a #15 scalpel blade.  The skin margins were undermined to an appropriate distance in all directions utilizing iris scissors.
Simple / Intermediate / Complex Repair - Final Wound Length In Cm: 4.9
Dressing (No Sutures): pressure dressing with telfa
Crescentic Complex Repair Preamble Text (Leave Blank If You Do Not Want): Extensive wide undermining was performed.
Bill 59 Modifier?: No - Continue to Bill 79 Modifier
Surgeon Performing Repair (Optional): Denise Johnston MD
Adjacent Tissue Transfer Text: The defect edges were debeveled with a #15 scalpel blade.  Given the location of the defect and the proximity to free margins an adjacent tissue transfer was deemed most appropriate.  Using a sterile surgical marker, an appropriate flap was drawn incorporating the defect and placing the expected incisions within the relaxed skin tension lines where possible.    The area thus outlined was incised deep to adipose tissue with a #15 scalpel blade.  The skin margins were undermined to an appropriate distance in all directions utilizing iris scissors.
Alternatives Discussed Intro (Do Not Add Period): I discussed alternative treatments to Mohs surgery and specifically discussed the risks and benefits of
Partial Purse String (Simple) Text: Given the location of the defect and the characteristics of the surrounding skin a simple purse string closure was deemed most appropriate.  Undermining was performed circumfirentially around the surgical defect.  A purse string suture was then placed and tightened. Wound tension only allowed a partial closure of the circular defect.
Mart-1 - Negative Histology Text: MART-1 staining demonstrates a normal density and pattern of melanocytes along the dermal-epidermal junction. The surgical margins are negative for tumor cells.
Consent 1/Introductory Paragraph: The rationale for Mohs was explained to the patient and consent was obtained. The risks, benefits and alternatives to therapy were discussed in detail. Specifically, the risks of infection, scarring, bleeding, prolonged wound healing, incomplete removal, allergy to anesthesia, nerve injury and recurrence were addressed. Prior to the procedure, the treatment site was clearly identified and confirmed by the patient. All components of Universal Protocol/PAUSE Rule completed.
Consent (Scalp)/Introductory Paragraph: The rationale for Mohs was explained to the patient and consent was obtained. The risks, benefits and alternatives to therapy were discussed in detail. Specifically, the risks of changes in hair growth pattern secondary to repair, infection, scarring, bleeding, prolonged wound healing, incomplete removal, allergy to anesthesia, nerve injury and recurrence were addressed. Prior to the procedure, the treatment site was clearly identified and confirmed by the patient. All components of Universal Protocol/PAUSE Rule completed.
O-Z Flap Text: The defect edges were debeveled with a #15 scalpel blade.  Given the location of the defect, shape of the defect and the proximity to free margins an O-Z flap was deemed most appropriate.  Using a sterile surgical marker, an appropriate transposition flap was drawn incorporating the defect and placing the expected incisions within the relaxed skin tension lines where possible. The area thus outlined was incised deep to adipose tissue with a #15 scalpel blade.  The skin margins were undermined to an appropriate distance in all directions utilizing iris scissors.
Consent (Ear)/Introductory Paragraph: The rationale for Mohs was explained to the patient and consent was obtained. The risks, benefits and alternatives to therapy were discussed in detail. Specifically, the risks of ear deformity, infection, scarring, bleeding, prolonged wound healing, incomplete removal, allergy to anesthesia, nerve injury and recurrence were addressed. Prior to the procedure, the treatment site was clearly identified and confirmed by the patient. All components of Universal Protocol/PAUSE Rule completed.
No Repair - Repaired With Adjacent Surgical Defect Text (Leave Blank If You Do Not Want): After obtaining clear surgical margins the defect was repaired concurrently with another surgical defect which was in close approximation.
Advancement-Rotation Flap Text: The defect edges were debeveled with a #15 scalpel blade.  Given the location of the defect, shape of the defect and the proximity to free margins an advancement-rotation flap was deemed most appropriate.  Using a sterile surgical marker, an appropriate flap was drawn incorporating the defect and placing the expected incisions within the relaxed skin tension lines where possible. The area thus outlined was incised deep to adipose tissue with a #15 scalpel blade.  The skin margins were undermined to an appropriate distance in all directions utilizing iris scissors.
Melolabial Interpolation Flap Text: A decision was made to reconstruct the defect utilizing an interpolation axial flap and a staged reconstruction.  A telfa template was made of the defect.  This telfa template was then used to outline the melolabial interpolation flap.  The donor area for the pedicle flap was then injected with anesthesia.  The flap was excised through the skin and subcutaneous tissue down to the layer of the underlying musculature.  The pedicle flap was carefully excised within this deep plane to maintain its blood supply.  The edges of the donor site were undermined.   The donor site was closed in a primary fashion.  The pedicle was then rotated into position and sutured.  Once the tube was sutured into place, adequate blood supply was confirmed with blanching and refill.  The pedicle was then wrapped with xeroform gauze and dressed appropriately with a telfa and gauze bandage to ensure continued blood supply and protect the attached pedicle.
Referred To Otolaryngology For Closure Text (Leave Blank If You Do Not Want): After obtaining clear surgical margins the patient was sent to otolaryngology for surgical repair.  The patient understands they will receive post-surgical care and follow-up from the referring physician's office.
Eye Protection Verbiage: Before proceeding with the stage, a plastic scleral shield was inserted. The globe was anesthetized with a few drops of 1% lidocaine with 1:100,000 epinephrine. Then, an appropriate sized scleral shield was chosen and coated with lacrilube ointment. The shield was gently inserted and left in place for the duration of each stage. After the stage was completed, the shield was gently removed.
Mohs Method Verbiage: An incision at a 45 degree angle following the standard Mohs approach was done and the specimen was harvested as a microscopic controlled layer.
Nostril Rim Text: The closure involved the nostril rim.
Ear Star Wedge Flap Text: The defect edges were debeveled with a #15 blade scalpel.  Given the location of the defect and the proximity to free margins (helical rim) an ear star wedge flap was deemed most appropriate.  Using a sterile surgical marker, the appropriate flap was drawn incorporating the defect and placing the expected incisions between the helical rim and antihelix where possible.  The area thus outlined was incised through and through with a #15 scalpel blade.
Cheiloplasty (Complex) Text: A decision was made to reconstruct the defect with a  cheiloplasty.  The defect was undermined extensively.  Additional obicularis oris muscle was excised with a 15 blade scalpel.  The defect was converted into a full thickness wedge to facilite a better cosmetic result.  Small vessels were then tied off with 5-0 monocyrl. The obicularis oris, superficial fascia, adipose and dermis were then reapproximated.  After the deeper layers were approximated the epidermis was reapproximated with particular care given to realign the vermilion border.
Zygomaticofacial Flap Text: Given the location of the defect, shape of the defect and the proximity to free margins a zygomaticofacial flap was deemed most appropriate for repair.  Using a sterile surgical marker, the appropriate flap was drawn incorporating the defect and placing the expected incisions within the relaxed skin tension lines where possible. The area thus outlined was incised deep to adipose tissue with a #15 scalpel blade with preservation of a vascular pedicle.  The skin margins were undermined to an appropriate distance in all directions utilizing iris scissors.  The flap was then placed into the defect and anchored with interrupted buried subcutaneous sutures.
Consent (Spinal Accessory)/Introductory Paragraph: The rationale for Mohs was explained to the patient and consent was obtained. The risks, benefits and alternatives to therapy were discussed in detail. Specifically, the risks of damage to the spinal accessory nerve, infection, scarring, bleeding, prolonged wound healing, incomplete removal, allergy to anesthesia, and recurrence were addressed. Prior to the procedure, the treatment site was clearly identified and confirmed by the patient. All components of Universal Protocol/PAUSE Rule completed.
Consent (Marginal Mandibular)/Introductory Paragraph: The rationale for Mohs was explained to the patient and consent was obtained. The risks, benefits and alternatives to therapy were discussed in detail. Specifically, the risks of damage to the marginal mandibular branch of the facial nerve, infection, scarring, bleeding, prolonged wound healing, incomplete removal, allergy to anesthesia, and recurrence were addressed. Prior to the procedure, the treatment site was clearly identified and confirmed by the patient. All components of Universal Protocol/PAUSE Rule completed.
Detail Level: Detailed
Retention Suture Bite Size: 1 mm
Partial Purse String (Intermediate) Text: Given the location of the defect and the characteristics of the surrounding skin an intermediate purse string closure was deemed most appropriate.  Undermining was performed circumfirentially around the surgical defect.  A purse string suture was then placed and tightened. Wound tension only allowed a partial closure of the circular defect.
Rhombic Flap Text: The defect edges were debeveled with a #15 scalpel blade.  Given the location of the defect and the proximity to free margins a rhombic flap was deemed most appropriate.  Using a sterile surgical marker, an appropriate rhombic flap was drawn incorporating the defect.    The area thus outlined was incised deep to adipose tissue with a #15 scalpel blade.  The skin margins were undermined to an appropriate distance in all directions utilizing iris scissors.
Area H Indication Text: Tumors in this location are included in Area H (eyelids, eyebrows, nose, lips, chin, ear, pre-auricular, post-auricular, temple, genitalia, hands, feet, ankles and areola).  Tissue conservation is critical in these anatomic locations.
Ear Wedge Repair Text: A wedge excision was completed by carrying down an excision through the full thickness of the ear and cartilage with an inward facing Burow's triangle. The wound was then closed in a layered fashion.
Abbe Flap (Upper To Lower Lip) Text: The defect of the lower lip was assessed and measured.  Given the location and size of the defect, an Abbe flap was deemed most appropriate.  Using a sterile surgical marker, an appropriate Abbe flap was measured and drawn on the upper lip. Local anesthesia was then infiltrated.  A scalpel was then used to incise the upper lip through and through the skin, vermilion, muscle and mucosa, leaving the flap pedicled on the opposite side.  The flap was then rotated and transferred to the lower lip defect.  The flap was then sutured into place with a three layer technique, closing the orbicularis oris muscle layer with subcutaneous buried sutures, followed by a mucosal layer and an epidermal layer.
Peng Advancement Flap Text: The defect edges were debeveled with a #15 scalpel blade.  Given the location of the defect, shape of the defect and the proximity to free margins a Peng advancement flap was deemed most appropriate.  Using a sterile surgical marker, an appropriate advancement flap was drawn incorporating the defect and placing the expected incisions within the relaxed skin tension lines where possible. The area thus outlined was incised deep to adipose tissue with a #15 scalpel blade.  The skin margins were undermined to an appropriate distance in all directions utilizing iris scissors.
Bilobed Flap Text: The defect edges were debeveled with a #15 scalpel blade.  Given the location of the defect and the proximity to free margins a bilobe flap was deemed most appropriate.  Using a sterile surgical marker, an appropriate bilobe flap drawn around the defect.    The area thus outlined was incised deep to adipose tissue with a #15 scalpel blade.  The skin margins were undermined to an appropriate distance in all directions utilizing iris scissors.
Modified Advancement Flap Text: The defect edges were debeveled with a #15 scalpel blade.  Given the location of the defect, shape of the defect and the proximity to free margins a modified advancement flap was deemed most appropriate.  Using a sterile surgical marker, an appropriate advancement flap was drawn incorporating the defect and placing the expected incisions within the relaxed skin tension lines where possible.    The area thus outlined was incised deep to adipose tissue with a #15 scalpel blade.  The skin margins were undermined to an appropriate distance in all directions utilizing iris scissors.
Advancement Flap (Double) Text: The defect edges were debeveled with a #15 scalpel blade.  Given the location of the defect and the proximity to free margins a double advancement flap was deemed most appropriate.  Using a sterile surgical marker, the appropriate advancement flaps were drawn incorporating the defect and placing the expected incisions within the relaxed skin tension lines where possible.    The area thus outlined was incised deep to adipose tissue with a #15 scalpel blade.  The skin margins were undermined to an appropriate distance in all directions utilizing iris scissors.
Graft Donor Site Epidermal Sutures (Optional): 5-0 Surgipro
Melolabial Transposition Flap Text: The defect edges were debeveled with a #15 scalpel blade.  Given the location of the defect and the proximity to free margins a melolabial flap was deemed most appropriate.  Using a sterile surgical marker, an appropriate melolabial transposition flap was drawn incorporating the defect.    The area thus outlined was incised deep to adipose tissue with a #15 scalpel blade.  The skin margins were undermined to an appropriate distance in all directions utilizing iris scissors.
Trilobed Flap Text: The defect edges were debeveled with a #15 scalpel blade.  Given the location of the defect and the proximity to free margins a trilobed flap was deemed most appropriate.  Using a sterile surgical marker, an appropriate trilobed flap drawn around the defect.    The area thus outlined was incised deep to adipose tissue with a #15 scalpel blade.  The skin margins were undermined to an appropriate distance in all directions utilizing iris scissors.
Complex Repair And Flap Additional Text (Will Appearing After The Standard Complex Repair Text): The complex repair was not sufficient to completely close the primary defect. The remaining additional defect was repaired with the flap mentioned below.
Mart-1 - Positive Histology Text: MART-1 staining demonstrates areas of higher density and clustering of melanocytes with Pagetoid spread upwards within the epidermis. The surgical margins are positive for tumor cells.
Chonodrocutaneous Helical Advancement Flap Text: The defect edges were debeveled with a #15 scalpel blade.  Given the location of the defect and the proximity to free margins a chondrocutaneous helical advancement flap was deemed most appropriate.  Using a sterile surgical marker, the appropriate advancement flap was drawn incorporating the defect and placing the expected incisions within the relaxed skin tension lines where possible.    The area thus outlined was incised deep to adipose tissue with a #15 scalpel blade.  The skin margins were undermined to an appropriate distance in all directions utilizing iris scissors.
Abbe Flap (Lower To Upper Lip) Text: The defect of the upper lip was assessed and measured.  Given the location and size of the defect, an Abbe flap was deemed most appropriate.  Using a sterile surgical marker, an appropriate Abbe flap was measured and drawn on the lower lip. Local anesthesia was then infiltrated. A scalpel was then used to incise the upper lip through and through the skin, vermilion, muscle and mucosa, leaving the flap pedicled on the opposite side.  The flap was then rotated and transferred to the lower lip defect.  The flap was then sutured into place with a three layer technique, closing the orbicularis oris muscle layer with subcutaneous buried sutures, followed by a mucosal layer and an epidermal layer.
Double Island Pedicle Flap Text: The defect edges were debeveled with a #15 scalpel blade.  Given the location of the defect, shape of the defect and the proximity to free margins a double island pedicle advancement flap was deemed most appropriate.  Using a sterile surgical marker, an appropriate advancement flap was drawn incorporating the defect, outlining the appropriate donor tissue and placing the expected incisions within the relaxed skin tension lines where possible.    The area thus outlined was incised deep to adipose tissue with a #15 scalpel blade.  The skin margins were undermined to an appropriate distance in all directions around the primary defect and laterally outward around the island pedicle utilizing iris scissors.  There was minimal undermining beneath the pedicle flap.
Suture Removal: 8 days
Consent (Temporal Branch)/Introductory Paragraph: The rationale for Mohs was explained to the patient and consent was obtained. The risks, benefits and alternatives to therapy were discussed in detail. Specifically, the risks of damage to the temporal branch of the facial nerve, infection, scarring, bleeding, prolonged wound healing, incomplete removal, allergy to anesthesia, and recurrence were addressed. Prior to the procedure, the treatment site was clearly identified and confirmed by the patient. All components of Universal Protocol/PAUSE Rule completed.
Suturegard Body: The suture ends were repeatedly re-tightened and re-clamped to achieve the desired tissue expansion.
Same Histology In Subsequent Stages Text: The pattern and morphology of the tumor is as described in the first stage.
Epidermal Sutures: 5-0 Prolene
Bcc Histology Text: There were numerous aggregates of basaloid cells.
Helical Rim Text: The closure involved the helical rim.
Flip-Flop Flap Text: The defect edges were debeveled with a #15 blade scalpel.  Given the location of the defect and the proximity to free margins a flip-flop flap was deemed most appropriate. Using a sterile surgical marker, the appropriate flap was drawn incorporating the defect and placing the expected incisions between the helical rim and antihelix where possible.  The area thus outlined was incised through and through with a #15 scalpel blade. Following this, the designed flap was carried over into the primary defect and sutured into place.
Composite Graft Text: The defect edges were debeveled with a #15 scalpel blade.  Given the location of the defect, shape of the defect, the proximity to free margins and the fact the defect was full thickness a composite graft was deemed most appropriate.  The defect was outline and then transferred to the donor site.  A full thickness graft was then excised from the donor site. The graft was then placed in the primary defect, oriented appropriately and then sutured into place.  The secondary defect was then repaired using a primary closure.
Epidermal Closure: running cuticular
Cheiloplasty (Less Than 50%) Text: A decision was made to reconstruct the defect with a  cheiloplasty.  The defect was undermined extensively.  Additional obicularis oris muscle was excised with a 15 blade scalpel.  The defect was converted into a full thickness wedge, of less than 50% of the vertical height of the lip, to facilite a better cosmetic result.  Small vessels were then tied off with 5-0 monocyrl. The obicularis oris, superficial fascia, adipose and dermis were then reapproximated.  After the deeper layers were approximated the epidermis was reapproximated with particular care given to realign the vermilion border.
Tissue Cultured Epidermal Autograft Text: The defect edges were debeveled with a #15 scalpel blade.  Given the location of the defect, shape of the defect and the proximity to free margins a tissue cultured epidermal autograft was deemed most appropriate.  The graft was then trimmed to fit the size of the defect.  The graft was then placed in the primary defect and oriented appropriately.
Spiral Flap Text: The defect edges were debeveled with a #15 scalpel blade.  Given the location of the defect, shape of the defect and the proximity to free margins a spiral flap was deemed most appropriate.  Using a sterile surgical marker, an appropriate rotation flap was drawn incorporating the defect and placing the expected incisions within the relaxed skin tension lines where possible. The area thus outlined was incised deep to adipose tissue with a #15 scalpel blade.  The skin margins were undermined to an appropriate distance in all directions utilizing iris scissors.
Suturegard Retention Suture: 0-0 Nylon
Closure 4 Information: This tab is for additional flaps and grafts above and beyond our usual structured repairs.  Please note if you enter information here it will not currently bill and you will need to add the billing information manually.
Post-Care Instructions: I reviewed with the patient in detail post-care instructions. Patient is not to engage in any heavy lifting, exercise, or swimming for the next 14 days. Should the patient develop any fevers, chills, bleeding, severe pain patient will contact the office immediately.
Purse String (Intermediate) Text: Given the location of the defect and the characteristics of the surrounding skin a purse string intermediate closure was deemed most appropriate.  Undermining was performed circumfirentially around the surgical defect.  A purse string suture was then placed and tightened.
Non-Graft Cartilage Fenestration Text: The cartilage was fenestrated with a 2mm punch biopsy to help facilitate healing.
Subsequent Stages Histo Method Verbiage: Using a similar technique to that described above, a thin layer of tissue was removed from all areas where tumor was visible on the previous stage.  The tissue was again oriented, mapped, dyed, and processed as above.
Epidermal Autograft Text: The defect edges were debeveled with a #15 scalpel blade.  Given the location of the defect, shape of the defect and the proximity to free margins an epidermal autograft was deemed most appropriate.  Using a sterile surgical marker, the primary defect shape was transferred to the donor site. The epidermal graft was then harvested.  The skin graft was then placed in the primary defect and oriented appropriately.
Number Of Stages: 1
Previous Accession (Optional): [id number]
Mohs Histo Method Verbiage: Each section was then chromacoded and processed in the Mohs lab using the Mohs protocol and submitted for frozen section.
Suturegard Intro: Intraoperative tissue expansion was performed, utilizing the SUTUREGARD device, in order to reduce wound tension.
M-Plasty Intermediate Repair Preamble Text (Leave Blank If You Do Not Want): Undermining was performed with blunt dissection.
Burow's Advancement Flap Text: The defect edges were debeveled with a #15 scalpel blade.  Given the location of the defect and the proximity to free margins a Burow's advancement flap was deemed most appropriate.  Using a sterile surgical marker, the appropriate advancement flap was drawn incorporating the defect and placing the expected incisions within the relaxed skin tension lines where possible.    The area thus outlined was incised deep to adipose tissue with a #15 scalpel blade.  The skin margins were undermined to an appropriate distance in all directions utilizing iris scissors.
Staging Info: By selecting yes to the question above you will include information on AJCC 8 tumor staging in your Mohs note. Information on tumor staging will be automatically added for SCCs on the head and neck. AJCC 8 includes tumor size, tumor depth, perineural involvement and bone invasion.
Area L Indication Text: Tumors in this location are included in Area L (trunk and extremities).  Mohs surgery is indicated for larger tumors, or tumors with aggressive histologic features, in these anatomic locations.
Additional Anesthesia Volume In Cc: 6
Oculoplastic Surgeon Procedure Text (E): After obtaining clear surgical margins the patient was sent to oculoplastics for surgical repair.  The patient understands they will receive post-surgical care and follow-up from the referring physician's office.
Hemigard Intro: Due to skin fragility and wound tension, it was decided to use HEMIGARD adhesive retention suture devices to permit a linear closure. The skin was cleaned and dried for a 6cm distance away from the wound. Excessive hair, if present, was removed to allow for adhesion.
Full Thickness Lip Wedge Repair (Flap) Text: Given the location of the defect and the proximity to free margins a full thickness wedge repair was deemed most appropriate.  Using a sterile surgical marker, the appropriate repair was drawn incorporating the defect and placing the expected incisions perpendicular to the vermilion border.  The vermilion border was also meticulously outlined to ensure appropriate reapproximation during the repair.  The area thus outlined was incised through and through with a #15 scalpel blade.  The muscularis and dermis were reaproximated with deep sutures following hemostasis. Care was taken to realign the vermilion border before proceeding with the superficial closure.  Once the vermilion was realigned the superfical and mucosal closure was finished.
Purse String (Simple) Text: Given the location of the defect and the characteristics of the surrounding skin a purse string closure was deemed most appropriate.  Undermining was performed circumfirentially around the surgical defect.  A purse string suture was then placed and tightened.
Date Of Previous Biopsy (Optional): 12/17/24
O-Z Plasty Text: The defect edges were debeveled with a #15 scalpel blade.  Given the location of the defect, shape of the defect and the proximity to free margins an O-Z plasty (double transposition flap) was deemed most appropriate.  Using a sterile surgical marker, the appropriate transposition flaps were drawn incorporating the defect and placing the expected incisions within the relaxed skin tension lines where possible.    The area thus outlined was incised deep to adipose tissue with a #15 scalpel blade.  The skin margins were undermined to an appropriate distance in all directions utilizing iris scissors.  Hemostasis was achieved with electrocautery.  The flaps were then transposed into place, one clockwise and the other counterclockwise, and anchored with interrupted buried subcutaneous sutures.
Home Suture Removal Text: Patient was provided instructions on removing sutures and will remove their sutures at home.  If they have any questions or difficulties they will call the office.
Which Instrument Did You Use For Dermabrasion?: Wire Brush
Mustarde Flap Text: The defect edges were debeveled with a #15 scalpel blade.  Given the size, depth and location of the defect and the proximity to free margins a Mustarde flap was deemed most appropriate.  Using a sterile surgical marker, an appropriate flap was drawn incorporating the defect. The area thus outlined was incised with a #15 scalpel blade.  The skin margins were undermined to an appropriate distance in all directions utilizing iris scissors.
Alar Island Pedicle Flap Text: The defect edges were debeveled with a #15 scalpel blade.  Given the location of the defect, shape of the defect and the proximity to the alar rim an island pedicle advancement flap was deemed most appropriate.  Using a sterile surgical marker, an appropriate advancement flap was drawn incorporating the defect, outlining the appropriate donor tissue and placing the expected incisions within the nasal ala running parallel to the alar rim. The area thus outlined was incised with a #15 scalpel blade.  The skin margins were undermined minimally to an appropriate distance in all directions around the primary defect and laterally outward around the island pedicle utilizing iris scissors.  There was minimal undermining beneath the pedicle flap.
Inflammation Suggestive Of Cancer Camouflage Histology Text: There was a dense lymphocytic infiltrate which prevented adequate histologic evaluation of adjacent structures.
Tumor Debulked?: curette
Cheek-To-Nose Interpolation Flap Text: A decision was made to reconstruct the defect utilizing an interpolation axial flap and a staged reconstruction.  A telfa template was made of the defect.  This telfa template was then used to outline the Cheek-To-Nose Interpolation flap.  The donor area for the pedicle flap was then injected with anesthesia.  The flap was excised through the skin and subcutaneous tissue down to the layer of the underlying musculature.  The interpolation flap was carefully excised within this deep plane to maintain its blood supply.  The edges of the donor site were undermined.   The donor site was closed in a primary fashion.  The pedicle was then rotated into position and sutured.  Once the tube was sutured into place, adequate blood supply was confirmed with blanching and refill.  The pedicle was then wrapped with xeroform gauze and dressed appropriately with a telfa and gauze bandage to ensure continued blood supply and protect the attached pedicle.
Complex Repair And Graft Additional Text (Will Appearing After The Standard Complex Repair Text): The complex repair was not sufficient to completely close the primary defect. The remaining additional defect was repaired with the graft mentioned below.
Dermal Autograft Text: The defect edges were debeveled with a #15 scalpel blade.  Given the location of the defect, shape of the defect and the proximity to free margins a dermal autograft was deemed most appropriate.  Using a sterile surgical marker, the primary defect shape was transferred to the donor site. The area thus outlined was incised deep to adipose tissue with a #15 scalpel blade.  The harvested graft was then trimmed of adipose and epidermal tissue until only dermis was left.  The skin graft was then placed in the primary defect and oriented appropriately.
Keystone Flap Text: The defect edges were debeveled with a #15 scalpel blade.  Given the location of the defect, shape of the defect a keystone flap was deemed most appropriate.  Using a sterile surgical marker, an appropriate keystone flap was drawn incorporating the defect, outlining the appropriate donor tissue and placing the expected incisions within the relaxed skin tension lines where possible. The area thus outlined was incised deep to adipose tissue with a #15 scalpel blade.  The skin margins were undermined to an appropriate distance in all directions around the primary defect and laterally outward around the flap utilizing iris scissors.
Interpolation Flap Text: A decision was made to reconstruct the defect utilizing an interpolation axial flap and a staged reconstruction.  A telfa template was made of the defect.  This telfa template was then used to outline the interpolation flap.  The donor area for the pedicle flap was then injected with anesthesia.  The flap was excised through the skin and subcutaneous tissue down to the layer of the underlying musculature.  The interpolation flap was carefully excised within this deep plane to maintain its blood supply.  The edges of the donor site were undermined.   The donor site was closed in a primary fashion.  The pedicle was then rotated into position and sutured.  Once the tube was sutured into place, adequate blood supply was confirmed with blanching and refill.  The pedicle was then wrapped with xeroform gauze and dressed appropriately with a telfa and gauze bandage to ensure continued blood supply and protect the attached pedicle.
Vermilion Border Text: The closure involved the vermilion border.
Initial Size Of Lesion: 0.8
Localized Dermabrasion With 15 Blade Text: The patient was draped in routine manner.  Localized dermabrasion using a 15 blade was performed in routine manner to papillary dermis. This spot dermabrasion is being performed to complete skin cancer reconstruction. It also will eliminate the other sun damaged precancerous cells that are known to be part of the regional effect of a lifetime's worth of sun exposure. This localized dermabrasion is therapeutic and should not be considered cosmetic in any regard.
Star Wedge Flap Text: The defect edges were debeveled with a #15 scalpel blade.  Given the location of the defect, shape of the defect and the proximity to free margins a star wedge flap was deemed most appropriate.  Using a sterile surgical marker, an appropriate rotation flap was drawn incorporating the defect and placing the expected incisions within the relaxed skin tension lines where possible. The area thus outlined was incised deep to adipose tissue with a #15 scalpel blade.  The skin margins were undermined to an appropriate distance in all directions utilizing iris scissors.
Double Z Plasty Text: The lesion was extirpated to the level of the fat with a #15 scalpel blade. Given the location of the defect, shape of the defect and the proximity to free margins a double Z-plasty was deemed most appropriate for repair. Using a sterile surgical marker, the appropriate transposition arms of the double Z-plasty were drawn incorporating the defect and placing the expected incisions within the relaxed skin tension lines where possible. The area thus outlined was incised deep to adipose tissue with a #15 scalpel blade. The skin margins were undermined to an appropriate distance in all directions utilizing iris scissors. The opposing transposition arms were then transposed and carried over into place in opposite direction and anchored with interrupted buried subcutaneous sutures.
Consent (Nose)/Introductory Paragraph: The rationale for Mohs was explained to the patient and consent was obtained. The risks, benefits and alternatives to therapy were discussed in detail. Specifically, the risks of nasal deformity, changes in the flow of air through the nose, infection, scarring, bleeding, prolonged wound healing, incomplete removal, allergy to anesthesia, nerve injury and recurrence were addressed. Prior to the procedure, the treatment site was clearly identified and confirmed by the patient. All components of Universal Protocol/PAUSE Rule completed.
A-T Advancement Flap Text: The defect edges were debeveled with a #15 scalpel blade.  Given the location of the defect, shape of the defect and the proximity to free margins an A-T advancement flap was deemed most appropriate.  Using a sterile surgical marker, an appropriate advancement flap was drawn incorporating the defect and placing the expected incisions within the relaxed skin tension lines where possible.    The area thus outlined was incised deep to adipose tissue with a #15 scalpel blade.  The skin margins were undermined to an appropriate distance in all directions utilizing iris scissors.
Rhomboid Transposition Flap Text: The defect edges were debeveled with a #15 scalpel blade.  Given the location of the defect and the proximity to free margins a rhomboid transposition flap was deemed most appropriate.  Using a sterile surgical marker, an appropriate rhomboid flap was drawn incorporating the defect.    The area thus outlined was incised deep to adipose tissue with a #15 scalpel blade.  The skin margins were undermined to an appropriate distance in all directions utilizing iris scissors.
Island Pedicle Flap Text: The defect edges were debeveled with a #15 scalpel blade.  Given the location of the defect, shape of the defect and the proximity to free margins an island pedicle advancement flap was deemed most appropriate.  Using a sterile surgical marker, an appropriate advancement flap was drawn incorporating the defect, outlining the appropriate donor tissue and placing the expected incisions within the relaxed skin tension lines where possible.    The area thus outlined was incised deep to adipose tissue with a #15 scalpel blade.  The skin margins were undermined to an appropriate distance in all directions around the primary defect and laterally outward around the island pedicle utilizing iris scissors.  There was minimal undermining beneath the pedicle flap.
Information: Selecting Yes will display possible errors in your note based on the variables you have selected. This validation is only offered as a suggestion for you. PLEASE NOTE THAT THE VALIDATION TEXT WILL BE REMOVED WHEN YOU FINALIZE YOUR NOTE. IF YOU WANT TO FAX A PRELIMINARY NOTE YOU WILL NEED TO TOGGLE THIS TO 'NO' IF YOU DO NOT WANT IT IN YOUR FAXED NOTE.
Secondary Intention Text (Leave Blank If You Do Not Want): The defect will heal with secondary intention.
Primary Defect Length In Cm (Final Defect Size - Required For Flaps/Grafts): 1.2
Tumor Depth: Less than 6mm from granular layer and no invasion beyond the subcutaneous fat
Perineural Invasion (For Histology - Be Specific If Possible): absent
Pain Refusal Text: I offered to prescribe pain medication but the patient refused to take this medication.
Which Eyelid Repair Cpt Are You Using?: 67466
Orbicularis Oris Muscle Flap Text: The defect edges were debeveled with a #15 scalpel blade.  Given that the defect affected the competency of the oral sphincter an orbicularis oris muscle flap was deemed most appropriate to restore this competency and normal muscle function.  Using a sterile surgical marker, an appropriate flap was drawn incorporating the defect. The area thus outlined was incised with a #15 scalpel blade.
Donor Site Anesthesia Type: same as repair anesthesia
Wound Check: 6 weeks
Consent Type: Consent 1 (Standard)
Double O-Z Flap Text: The defect edges were debeveled with a #15 scalpel blade.  Given the location of the defect, shape of the defect and the proximity to free margins a Double O-Z flap was deemed most appropriate.  Using a sterile surgical marker, an appropriate transposition flap was drawn incorporating the defect and placing the expected incisions within the relaxed skin tension lines where possible. The area thus outlined was incised deep to adipose tissue with a #15 scalpel blade.  The skin margins were undermined to an appropriate distance in all directions utilizing iris scissors.
No Residual Tumor Seen Histology Text: There were no malignant cells seen in the sections examined.
Nasal Turnover Hinge Flap Text: The defect edges were debeveled with a #15 scalpel blade.  Given the size, depth, location of the defect and the defect being full thickness a nasal turnover hinge flap was deemed most appropriate.  Using a sterile surgical marker, an appropriate hinge flap was drawn incorporating the defect. The area thus outlined was incised with a #15 scalpel blade. The flap was designed to recreate the nasal mucosal lining and the alar rim. The skin margins were undermined to an appropriate distance in all directions utilizing iris scissors.
Surgeon/Pathologist Verbiage (Will Incorporate Name Of Surgeon From Intro If Not Blank): operated in two distinct and integrated capacities as the surgeon and pathologist.
O-T Plasty Text: The defect edges were debeveled with a #15 scalpel blade.  Given the location of the defect, shape of the defect and the proximity to free margins an O-T plasty was deemed most appropriate.  Using a sterile surgical marker, an appropriate O-T plasty was drawn incorporating the defect and placing the expected incisions within the relaxed skin tension lines where possible.    The area thus outlined was incised deep to adipose tissue with a #15 scalpel blade.  The skin margins were undermined to an appropriate distance in all directions utilizing iris scissors.
V-Y Flap Text: The defect edges were debeveled with a #15 scalpel blade.  Given the location of the defect, shape of the defect and the proximity to free margins a V-Y flap was deemed most appropriate.  Using a sterile surgical marker, an appropriate advancement flap was drawn incorporating the defect and placing the expected incisions within the relaxed skin tension lines where possible.    The area thus outlined was incised deep to adipose tissue with a #15 scalpel blade.  The skin margins were undermined to an appropriate distance in all directions utilizing iris scissors.
Intermediate Repair And Flap Additional Text (Will Appearing After The Standard Complex Repair Text): The intermediate repair was not sufficient to completely close the primary defect. The remaining additional defect was repaired with the flap mentioned below.
Ftsg Text: The defect edges were debeveled with a #15 scalpel blade.  Given the location of the defect, shape of the defect and the proximity to free margins a full thickness skin graft was deemed most appropriate.  Using a sterile surgical marker, the primary defect shape was transferred to the donor site. The area thus outlined was incised deep to adipose tissue with a #15 scalpel blade.  The harvested graft was then trimmed of adipose tissue until only dermis and epidermis was left.  The skin margins of the secondary defect were undermined to an appropriate distance in all directions utilizing iris scissors.  The secondary defect was closed with interrupted buried subcutaneous sutures.  The skin edges were then re-apposed with running  sutures.  The skin graft was then placed in the primary defect and oriented appropriately.
Area M Indication Text: Tumors in this location are included in Area M (cheek, forehead, scalp, neck, jawline and pretibial skin).  Mohs surgery is indicated for tumors in these anatomic locations.
V-Y Plasty Text: The defect edges were debeveled with a #15 scalpel blade.  Given the location of the defect, shape of the defect and the proximity to free margins an V-Y advancement flap was deemed most appropriate.  Using a sterile surgical marker, an appropriate advancement flap was drawn incorporating the defect and placing the expected incisions within the relaxed skin tension lines where possible.    The area thus outlined was incised deep to adipose tissue with a #15 scalpel blade.  The skin margins were undermined to an appropriate distance in all directions utilizing iris scissors.
Mauc Instructions: By selecting yes to the question below the MAUC number will be added into the note.  This will be calculated automatically based on the diagnosis chosen, the size entered, the body zone selected (H,M,L) and the specific indications you chose. You will also have the option to override the Mohs AUC if you disagree with the automatically calculated number and this option is found in the Case Summary tab.
Paramedian Forehead Flap Text: A decision was made to reconstruct the defect utilizing an interpolation axial flap and a staged reconstruction.  A telfa template was made of the defect.  This telfa template was then used to outline the paramedian forehead pedicle flap.  The donor area for the pedicle flap was then injected with anesthesia.  The flap was excised through the skin and subcutaneous tissue down to the layer of the underlying musculature.  The pedicle flap was carefully excised within this deep plane to maintain its blood supply.  The edges of the donor site were undermined.   The donor site was closed in a primary fashion.  The pedicle was then rotated into position and sutured.  Once the tube was sutured into place, adequate blood supply was confirmed with blanching and refill.  The pedicle was then wrapped with xeroform gauze and dressed appropriately with a telfa and gauze bandage to ensure continued blood supply and protect the attached pedicle.
X Size Of Lesion In Cm (Optional): 0.7
Nasalis-Muscle-Based Myocutaneous Island Pedicle Flap Text: Using a #15 blade, an incision was made around the donor flap to the level of the nasalis muscle. Wide lateral undermining was then performed in both the subcutaneous plane above the nasalis muscle, and in a submuscular plane just above periosteum. This allowed the formation of a free nasalis muscle axial pedicle (based on the angular artery) which was still attached to the actual cutaneous flap, increasing its mobility and vascular viability. Hemostasis was obtained with pinpoint electrocoagulation. The flap was mobilized into position and the pivotal anchor points positioned and stabilized with buried interrupted sutures. Subcutaneous and dermal tissues were closed in a multilayered fashion with sutures. Tissue redundancies were excised, and the epidermal edges were apposed without significant tension and sutured with sutures.
Consent (Lip)/Introductory Paragraph: The rationale for Mohs was explained to the patient and consent was obtained. The risks, benefits and alternatives to therapy were discussed in detail. Specifically, the risks of lip deformity, changes in the oral aperture, infection, scarring, bleeding, prolonged wound healing, incomplete removal, allergy to anesthesia, nerve injury and recurrence were addressed. Prior to the procedure, the treatment site was clearly identified and confirmed by the patient. All components of Universal Protocol/PAUSE Rule completed.
Epidermal Closure Graft Donor Site (Optional): running
Undermining Location (Optional): in the superficial subcutaneous fat
Repair Type: Complex Repair
Bilobed Transposition Flap Text: The defect edges were debeveled with a #15 scalpel blade.  Given the location of the defect and the proximity to free margins a bilobed transposition flap was deemed most appropriate.  Using a sterile surgical marker, an appropriate bilobe flap drawn around the defect.    The area thus outlined was incised deep to adipose tissue with a #15 scalpel blade.  The skin margins were undermined to an appropriate distance in all directions utilizing iris scissors.
Dorsal Nasal Flap Text: The defect edges were debeveled with a #15 scalpel blade.  Given the location of the defect and the proximity to free margins a dorsal nasal flap was deemed most appropriate.  Using a sterile surgical marker, an appropriate dorsal nasal flap was drawn around the defect.    The area thus outlined was incised deep to adipose tissue with a #15 scalpel blade.  The skin margins were undermined to an appropriate distance in all directions utilizing iris scissors.
Mohs Rapid Report Verbiage: The area of clinically evident tumor was marked with skin marking ink and appropriately hatched.  The initial incision was made following the Mohs approach through the skin.  The specimen was taken to the lab, divided into the necessary number of pieces, chromacoded and processed according to the Mohs protocol.  This was repeated in successive stages until a tumor free defect was achieved.
Cheek Interpolation Flap Text: A decision was made to reconstruct the defect utilizing an interpolation axial flap and a staged reconstruction.  A telfa template was made of the defect.  This telfa template was then used to outline the Cheek Interpolation flap.  The donor area for the pedicle flap was then injected with anesthesia.  The flap was excised through the skin and subcutaneous tissue down to the layer of the underlying musculature.  The interpolation flap was carefully excised within this deep plane to maintain its blood supply.  The edges of the donor site were undermined.   The donor site was closed in a primary fashion.  The pedicle was then rotated into position and sutured.  Once the tube was sutured into place, adequate blood supply was confirmed with blanching and refill.  The pedicle was then wrapped with xeroform gauze and dressed appropriately with a telfa and gauze bandage to ensure continued blood supply and protect the attached pedicle.
Medical Necessity Statement: Based on my medical judgement, Mohs surgery is the most appropriate treatment for this cancer compared to other treatments.
Bi-Rhombic Flap Text: The defect edges were debeveled with a #15 scalpel blade.  Given the location of the defect and the proximity to free margins a bi-rhombic flap was deemed most appropriate.  Using a sterile surgical marker, an appropriate rhombic flap was drawn incorporating the defect. The area thus outlined was incised deep to adipose tissue with a #15 scalpel blade.  The skin margins were undermined to an appropriate distance in all directions utilizing iris scissors.
Cartilage Graft Text: The defect edges were debeveled with a #15 scalpel blade.  Given the location of the defect, shape of the defect, the fact the defect involved a full thickness cartilage defect a cartilage graft was deemed most appropriate.  An appropriate donor site was identified, cleansed, and anesthetized. The cartilage graft was then harvested and transferred to the recipient site, oriented appropriately and then sutured into place.  The secondary defect was then repaired using a primary closure.
Tarsorrhaphy Text: A tarsorrhaphy was performed using Frost sutures.
Mohs Case Number: [id number]
Brow Lift Text: A midfrontal incision was made medially to the defect to allow access to the tissues just superior to the left eyebrow. Following careful dissection inferiorly in a supraperiosteal plane to the level of the left eyebrow, several 3-0 monocryl sutures were used to resuspend the eyebrow orbicularis oculi muscular unit to the superior frontal bone periosteum. This resulted in an appropriate reapproximation of static eyebrow symmetry and correction of the left brow ptosis.
Anesthesia Volume In Cc: 3
Deep Sutures: 5-0 Monocryl
Manual Repair Warning Statement: We plan on removing the manually selected variable below in favor of our much easier automatic structured text blocks found in the previous tab. We decided to do this to help make the flow better and give you the full power of structured data. Manual selection is never going to be ideal in our platform and I would encourage you to avoid using manual selection from this point on, especially since I will be sunsetting this feature. It is important that you do one of two things with the customized text below. First, you can save all of the text in a word file so you can have it for future reference. Second, transfer the text to the appropriate area in the Library tab. Lastly, if there is a flap or graft type which we do not have you need to let us know right away so I can add it in before the variable is hidden. No need to panic, we plan to give you roughly 6 months to make the change.
Estimated Blood Loss (Cc): minimal
Localized Dermabrasion With Sand Papertext: The patient was draped in routine manner.  Localized dermabrasion using sterile sand paper was performed in routine manner to papillary dermis. This spot dermabrasion is being performed to complete skin cancer reconstruction. It also will eliminate the other sun damaged precancerous cells that are known to be part of the regional effect of a lifetime's worth of sun exposure. This localized dermabrasion is therapeutic and should not be considered cosmetic in any regard.
Consent 3/Introductory Paragraph: I gave the patient a chance to ask questions they had about the procedure.  Following this I explained the Mohs procedure and consent was obtained. The risks, benefits and alternatives to therapy were discussed in detail. Specifically, the risks of infection, scarring, bleeding, prolonged wound healing, incomplete removal, allergy to anesthesia, nerve injury and recurrence were addressed. Prior to the procedure, the treatment site was clearly identified and confirmed by the patient. All components of Universal Protocol/PAUSE Rule completed.
H Plasty Text: Given the location of the defect, shape of the defect and the proximity to free margins a H-plasty was deemed most appropriate for repair.  Using a sterile surgical marker, the appropriate advancement arms of the H-plasty were drawn incorporating the defect and placing the expected incisions within the relaxed skin tension lines where possible. The area thus outlined was incised deep to adipose tissue with a #15 scalpel blade. The skin margins were undermined to an appropriate distance in all directions utilizing iris scissors.  The opposing advancement arms were then advanced into place in opposite direction and anchored with interrupted buried subcutaneous sutures.
Retention Suture Text: Retention sutures were placed to support the closure and prevent dehiscence.
Pinch Graft Text: The defect edges were debeveled with a #15 scalpel blade. Given the location of the defect, shape of the defect and the proximity to free margins a pinch graft was deemed most appropriate. Using a sterile surgical marker, the primary defect shape was transferred to the donor site. The area thus outlined was incised deep to adipose tissue with a #15 scalpel blade.  The harvested graft was then trimmed of adipose tissue until only dermis and epidermis was left. The skin margins of the secondary defect were undermined to an appropriate distance in all directions utilizing iris scissors.  The secondary defect was closed with interrupted buried subcutaneous sutures.  The skin edges were then re-apposed with running  sutures.  The skin graft was then placed in the primary defect and oriented appropriately.
Depth Of Tumor Invasion (For Histology): dermis
Closure 2 Information: This tab is for additional flaps and grafts, including complex repair and grafts and complex repair and flaps. You can also specify a different location for the additional defect, if the location is the same you do not need to select a new one. We will insert the automated text for the repair you select below just as we do for solitary flaps and grafts. Please note that at this time if you select a location with a different insurance zone you will need to override the ICD10 and CPT if appropriate.
W Plasty Text: The lesion was extirpated to the level of the fat with a #15 scalpel blade.  Given the location of the defect, shape of the defect and the proximity to free margins a W-plasty was deemed most appropriate for repair.  Using a sterile surgical marker, the appropriate transposition arms of the W-plasty were drawn incorporating the defect and placing the expected incisions within the relaxed skin tension lines where possible.    The area thus outlined was incised deep to adipose tissue with a #15 scalpel blade.  The skin margins were undermined to an appropriate distance in all directions utilizing iris scissors.  The opposing transposition arms were then transposed into place in opposite direction and anchored with interrupted buried subcutaneous sutures.
Rotation Flap Text: The defect edges were debeveled with a #15 scalpel blade.  Given the location of the defect, shape of the defect and the proximity to free margins a rotation flap was deemed most appropriate.  Using a sterile surgical marker, an appropriate rotation flap was drawn incorporating the defect and placing the expected incisions within the relaxed skin tension lines where possible.    The area thus outlined was incised deep to adipose tissue with a #15 scalpel blade.  The skin margins were undermined to an appropriate distance in all directions utilizing iris scissors.
Hemostasis: Electrocautery
Hemigard Postcare Instructions: The HEMIGARD strips are to remain completely dry for at least 5-7 days.
Helical Rim Advancement Flap Text: The defect edges were debeveled with a #15 blade scalpel.  Given the location of the defect and the proximity to free margins (helical rim) a double helical rim advancement flap was deemed most appropriate.  Using a sterile surgical marker, the appropriate advancement flaps were drawn incorporating the defect and placing the expected incisions between the helical rim and antihelix where possible.  The area thus outlined was incised through and through with a #15 scalpel blade.  With a skin hook and iris scissors, the flaps were gently and sharply undermined and freed up.
Hatchet Flap Text: The defect edges were debeveled with a #15 scalpel blade.  Given the location of the defect, shape of the defect and the proximity to free margins a hatchet flap was deemed most appropriate.  Using a sterile surgical marker, an appropriate hatchet flap was drawn incorporating the defect and placing the expected incisions within the relaxed skin tension lines where possible.    The area thus outlined was incised deep to adipose tissue with a #15 scalpel blade.  The skin margins were undermined to an appropriate distance in all directions utilizing iris scissors.
Island Pedicle Flap-Requiring Vessel Identification Text: The defect edges were debeveled with a #15 scalpel blade.  Given the location of the defect, shape of the defect and the proximity to free margins an island pedicle advancement flap was deemed most appropriate.  Using a sterile surgical marker, an appropriate advancement flap was drawn, based on the axial vessel mentioned above, incorporating the defect, outlining the appropriate donor tissue and placing the expected incisions within the relaxed skin tension lines where possible.    The area thus outlined was incised deep to adipose tissue with a #15 scalpel blade.  The skin margins were undermined to an appropriate distance in all directions around the primary defect and laterally outward around the island pedicle utilizing iris scissors.  There was minimal undermining beneath the pedicle flap.
Graft Donor Site Bandage (Optional-Leave Blank If You Don't Want In Note): Aquaplast was fitted to the graft site and sewn into place. A pressure bandage were applied to the donor site and over the aquaplast bolster.
Location Indication Override (Is Already Calculated Based On Selected Body Location): Area M
Double O-Z Plasty Text: The defect edges were debeveled with a #15 scalpel blade.  Given the location of the defect, shape of the defect and the proximity to free margins a Double O-Z plasty (double transposition flap) was deemed most appropriate.  Using a sterile surgical marker, the appropriate transposition flaps were drawn incorporating the defect and placing the expected incisions within the relaxed skin tension lines where possible. The area thus outlined was incised deep to adipose tissue with a #15 scalpel blade.  The skin margins were undermined to an appropriate distance in all directions utilizing iris scissors.  Hemostasis was achieved with electrocautery.  The flaps were then transposed into place, one clockwise and the other counterclockwise, and anchored with interrupted buried subcutaneous sutures.
Distance Of Undermining In Cm (Required): 1.3
Debridement Text: The wound edges were debrided prior to proceeding with the closure to facilitate wound healing.
Posterior Auricular Interpolation Flap Text: A decision was made to reconstruct the defect utilizing an interpolation axial flap and a staged reconstruction.  A telfa template was made of the defect.  This telfa template was then used to outline the posterior auricular interpolation flap.  The donor area for the pedicle flap was then injected with anesthesia.  The flap was excised through the skin and subcutaneous tissue down to the layer of the underlying musculature.  The pedicle flap was carefully excised within this deep plane to maintain its blood supply.  The edges of the donor site were undermined.   The donor site was closed in a primary fashion.  The pedicle was then rotated into position and sutured.  Once the tube was sutured into place, adequate blood supply was confirmed with blanching and refill.  The pedicle was then wrapped with xeroform gauze and dressed appropriately with a telfa and gauze bandage to ensure continued blood supply and protect the attached pedicle.
Mastoid Interpolation Flap Text: A decision was made to reconstruct the defect utilizing an interpolation axial flap and a staged reconstruction.  A telfa template was made of the defect.  This telfa template was then used to outline the mastoid interpolation flap.  The donor area for the pedicle flap was then injected with anesthesia.  The flap was excised through the skin and subcutaneous tissue down to the layer of the underlying musculature.  The pedicle flap was carefully excised within this deep plane to maintain its blood supply.  The edges of the donor site were undermined.   The donor site was closed in a primary fashion.  The pedicle was then rotated into position and sutured.  Once the tube was sutured into place, adequate blood supply was confirmed with blanching and refill.  The pedicle was then wrapped with xeroform gauze and dressed appropriately with a telfa and gauze bandage to ensure continued blood supply and protect the attached pedicle.
Consent (Near Eyelid Margin)/Introductory Paragraph: The rationale for Mohs was explained to the patient and consent was obtained. The risks, benefits and alternatives to therapy were discussed in detail. Specifically, the risks of ectropion or eyelid deformity, infection, scarring, bleeding, prolonged wound healing, incomplete removal, allergy to anesthesia, nerve injury and recurrence were addressed. Prior to the procedure, the treatment site was clearly identified and confirmed by the patient. All components of Universal Protocol/PAUSE Rule completed.
Referring Physician (Optional): Chelsea Trevino MD
Banner Transposition Flap Text: The defect edges were debeveled with a #15 scalpel blade.  Given the location of the defect and the proximity to free margins a Banner transposition flap was deemed most appropriate.  Using a sterile surgical marker, an appropriate flap drawn around the defect. The area thus outlined was incised deep to adipose tissue with a #15 scalpel blade.  The skin margins were undermined to an appropriate distance in all directions utilizing iris scissors.
Burow's Graft Text: The defect edges were debeveled with a #15 scalpel blade.  Given the location of the defect, shape of the defect, the proximity to free margins and the presence of a standing cone deformity a Burow's skin graft was deemed most appropriate. The standing cone was removed and this tissue was then trimmed to the shape of the primary defect. The adipose tissue was also removed until only dermis and epidermis were left.  The skin margins of the secondary defect were undermined to an appropriate distance in all directions utilizing iris scissors.  The secondary defect was closed with interrupted buried subcutaneous sutures.  The skin edges were then re-apposed with running  sutures.  The skin graft was then placed in the primary defect and oriented appropriately.
Unna Boot Text: An Unna boot was placed to help immobilize the limb and facilitate more rapid healing.
Rectangular Flap Text: The defect edges were debeveled with a #15 scalpel blade. Given the location of the defect and the proximity to free margins a rectangular flap was deemed most appropriate. Using a sterile surgical marker, an appropriate rectangular flap was drawn incorporating the defect. The area thus outlined was incised deep to adipose tissue with a #15 scalpel blade. The skin margins were undermined to an appropriate distance in all directions utilizing iris scissors. Following this, the designed flap was carried over into the primary defect and sutured into place.
Graft Cartilage Fenestration Text: The cartilage was fenestrated with a 2mm punch biopsy to help facilitate graft survival and healing.
Staged Advancement Flap Text: The defect edges were debeveled with a #15 scalpel blade.  Given the location of the defect, shape of the defect and the proximity to free margins a staged advancement flap was deemed most appropriate.  Using a sterile surgical marker, an appropriate advancement flap was drawn incorporating the defect and placing the expected incisions within the relaxed skin tension lines where possible. The area thus outlined was incised deep to adipose tissue with a #15 scalpel blade.  The skin margins were undermined to an appropriate distance in all directions utilizing iris scissors.
O-T Advancement Flap Text: The defect edges were debeveled with a #15 scalpel blade.  Given the location of the defect, shape of the defect and the proximity to free margins an O-T advancement flap was deemed most appropriate.  Using a sterile surgical marker, an appropriate advancement flap was drawn incorporating the defect and placing the expected incisions within the relaxed skin tension lines where possible.    The area thus outlined was incised deep to adipose tissue with a #15 scalpel blade.  The skin margins were undermined to an appropriate distance in all directions utilizing iris scissors.
Localized Dermabrasion With Wire Brush Text: The patient was draped in routine manner.  Localized dermabrasion using 3 x 17 mm wire brush was performed in routine manner to papillary dermis. This spot dermabrasion is being performed to complete skin cancer reconstruction. It also will eliminate the other sun damaged precancerous cells that are known to be part of the regional effect of a lifetime's worth of sun exposure. This localized dermabrasion is therapeutic and should not be considered cosmetic in any regard.
Mercedes Flap Text: The defect edges were debeveled with a #15 scalpel blade.  Given the location of the defect, shape of the defect and the proximity to free margins a Mercedes flap was deemed most appropriate.  Using a sterile surgical marker, an appropriate advancement flap was drawn incorporating the defect and placing the expected incisions within the relaxed skin tension lines where possible. The area thus outlined was incised deep to adipose tissue with a #15 scalpel blade.  The skin margins were undermined to an appropriate distance in all directions utilizing iris scissors.
Z Plasty Text: The lesion was extirpated to the level of the fat with a #15 scalpel blade.  Given the location of the defect, shape of the defect and the proximity to free margins a Z-plasty was deemed most appropriate for repair.  Using a sterile surgical marker, the appropriate transposition arms of the Z-plasty were drawn incorporating the defect and placing the expected incisions within the relaxed skin tension lines where possible.    The area thus outlined was incised deep to adipose tissue with a #15 scalpel blade.  The skin margins were undermined to an appropriate distance in all directions utilizing iris scissors.  The opposing transposition arms were then transposed into place in opposite direction and anchored with interrupted buried subcutaneous sutures.
Undermining Type: Entire Wound
Nasalis Myocutaneous Flap Text: Using a #15 blade, an incision was made around the donor flap to the level of the nasalis muscle. Wide lateral undermining was then performed in both the subcutaneous plane above the nasalis muscle, and in a submuscular plane just above periosteum. This allowed the formation of a free nasalis muscle axial pedicle which was still attached to the actual cutaneous flap, increasing its mobility and vascular viability. Hemostasis was obtained with pinpoint electrocoagulation. The flap was mobilized into position and the pivotal anchor points positioned and stabilized with buried interrupted sutures. Subcutaneous and dermal tissues were closed in a multilayered fashion with sutures. Tissue redundancies were excised, and the epidermal edges were apposed without significant tension and sutured with sutures.
O-L Flap Text: The defect edges were debeveled with a #15 scalpel blade.  Given the location of the defect, shape of the defect and the proximity to free margins an O-L flap was deemed most appropriate.  Using a sterile surgical marker, an appropriate advancement flap was drawn incorporating the defect and placing the expected incisions within the relaxed skin tension lines where possible.    The area thus outlined was incised deep to adipose tissue with a #15 scalpel blade.  The skin margins were undermined to an appropriate distance in all directions utilizing iris scissors.
Skin Substitute Text: The defect edges were debeveled with a #15 scalpel blade.  Given the location of the defect, shape of the defect and the proximity to free margins a skin substitute graft was deemed most appropriate.  The graft material was trimmed to fit the size of the defect. The graft was then placed in the primary defect and oriented appropriately.
Muscle Hinge Flap Text: The defect edges were debeveled with a #15 scalpel blade.  Given the size, depth and location of the defect and the proximity to free margins a muscle hinge flap was deemed most appropriate.  Using a sterile surgical marker, an appropriate hinge flap was drawn incorporating the defect. The area thus outlined was incised with a #15 scalpel blade.  The skin margins were undermined to an appropriate distance in all directions utilizing iris scissors.
Bilateral Rotation Flap Text: The defect edges were debeveled with a #15 scalpel blade. Given the location of the defect, shape of the defect and the proximity to free margins a bilateral rotation flap was deemed most appropriate. Using a sterile surgical marker, an appropriate rotation flap was drawn incorporating the defect and placing the expected incisions within the relaxed skin tension lines where possible. The area thus outlined was incised deep to adipose tissue with a #15 scalpel blade. The skin margins were undermined to an appropriate distance in all directions utilizing iris scissors. Following this, the designed flap was carried over into the primary defect and sutured into place.

## 2025-02-14 ENCOUNTER — APPOINTMENT (OUTPATIENT)
Dept: SLEEP MEDICINE | Facility: MEDICAL CENTER | Age: 70
End: 2025-02-14
Attending: NURSE PRACTITIONER
Payer: COMMERCIAL

## 2025-02-19 ENCOUNTER — OFFICE VISIT (OUTPATIENT)
Dept: SLEEP MEDICINE | Facility: MEDICAL CENTER | Age: 70
End: 2025-02-19
Attending: NURSE PRACTITIONER
Payer: COMMERCIAL

## 2025-02-19 VITALS
HEIGHT: 68 IN | OXYGEN SATURATION: 92 % | DIASTOLIC BLOOD PRESSURE: 84 MMHG | BODY MASS INDEX: 40.16 KG/M2 | RESPIRATION RATE: 16 BRPM | WEIGHT: 265 LBS | HEART RATE: 58 BPM | SYSTOLIC BLOOD PRESSURE: 120 MMHG

## 2025-02-19 DIAGNOSIS — G47.33 OSA ON CPAP: ICD-10-CM

## 2025-02-19 PROCEDURE — 99214 OFFICE O/P EST MOD 30 MIN: CPT | Performed by: NURSE PRACTITIONER

## 2025-02-19 PROCEDURE — 3079F DIAST BP 80-89 MM HG: CPT | Performed by: NURSE PRACTITIONER

## 2025-02-19 PROCEDURE — 99213 OFFICE O/P EST LOW 20 MIN: CPT | Performed by: NURSE PRACTITIONER

## 2025-02-19 PROCEDURE — 3074F SYST BP LT 130 MM HG: CPT | Performed by: NURSE PRACTITIONER

## 2025-02-19 ASSESSMENT — FIBROSIS 4 INDEX: FIB4 SCORE: 1.41

## 2025-02-19 ASSESSMENT — PATIENT HEALTH QUESTIONNAIRE - PHQ9: CLINICAL INTERPRETATION OF PHQ2 SCORE: 0

## 2025-02-19 NOTE — PROGRESS NOTES
Chief Complaint   Patient presents with    Follow-Up     SLEEP - ESTABLISHED PT CHART PREP COMPLETED ON 02/11 2025   Setup date: 05/31/2024  Last Office Visit 11/14/2024 with Harlan garcia Compliance: No     DME: DME:  DME: Connor phone: [phone] fax: [phone]       PAP/O2/OAT: AirSense 11 AutoSet  Min Pressure (cmH2O)  11.0  Max Pressure (cmH2O)  15.0  EPR  Fulltime  EPR level (cmH2O)  3  Ramp enable  Auto  Ramp time (min)  20  Start pressure (cmH2O)  4.0  Response  Standard     Wireless Data? YES ResMed     PAP/O2/OAT:  CPAP 5-15cm    Wireless       HPI:  Sara Knight is a 69 y.o. year old female here today for follow-up on ZURDO with first compliance on CPAP.  Last seen 11/14/2024 for titration study results.. Patient had multiple overnight pulse oximetry's in the past which were abnormal.  Patient was previously on auto CPAP 5 to 15 cm/H2O.  She is currently using a nasal mask with chinstrap.  Was recommended to switch to a hybrid fullface mask due to mouth opening when sleeping.     30-day compliance reviewed shows 87% use with an average time of 7 hours and 59 minutes and a residual AHI of 0.5 with no evidence of mask leak.     PSG titration (10/28/2024):  1.  Patient is CPAP and BiPAP during night of study  2.  BiPAP was tried at end of study  3.  Respiratory events improved with CPAP therapy  4.  Oxygen saturations improved with PAP therapy  5.  No supine REM sleep seen during night of study     Recommendations:  I recommend auto CPAP 7-14 cmH2O.  Patient used a medium AirFit F20 mask during night of study.          ROS: As per HPI and otherwise negative if not stated.    Past Medical History:   Diagnosis Date    Adverse effect of anesthesia 2017    Bradycardiac with gallbladder surgery    Anesthesia     PONV    Aortic valve sclerosis     MILD-without arotic stenosis per echo report 7/6/22    Apnea, sleep     Arrhythmia     Afib- Follows with Dr FABI Navarro @ [facility]     Arthritis     osteo    Asthma     daily inhalor    Atrial fibrillation (HCC)     Back pain     Gavin esophagus     taking omeprazole    Bronchitis     Chickenpox     Cough     Diabetes (HCC)     Pre-Diabetes-does not check glucose at home    GERD (gastroesophageal reflux disease)     Heart burn     taking omeprazole    Heartburn     High cholesterol     Hypertension     Indigestion     taking omeprazole    Left knee pain 09/20/2023    Morning headache     Mumps     Obesity     Osteoarthritis     Painful joint     Palpitations     Pneumonia     PONV (postoperative nausea and vomiting)     with codiene and opiates    Rhinitis     Right knee pain 03/07/2023    Seasonal allergies     Sleep apnea     Wears glasses     Wheezing        Past Surgical History:   Procedure Laterality Date    PB MANIPULATN KNEE JT+ANESTHESIA Left 01/23/2024    Procedure: LEFT KNEE MANIPULATION;  Surgeon: Jarrell Harris M.D.;  Location: [facility] [place];  Service: Orthopedics    PB TOTAL KNEE ARTHROPLASTY Left 10/18/2023    Procedure: LEFT TOTAL KNEE ARTHROPLASTY - KYLEE;  Surgeon: Jarrell Harris M.D.;  Location: SURGERY [facility];  Service: Ortho Robotic    PB TOTAL KNEE ARTHROPLASTY  05/10/2023    Procedure: RIGHT TOTAL KNEE ARTHROPLASTY;  Surgeon: Jarrell Harris M.D.;  Location: [facility];  Service: Orthopedics    PB KNEE SCOPE,MED/LAT MENISECTOMY Right 08/02/2022    Procedure: [facility] KNEE ARTHROSCOPY MEDIAL MENISCECTOMY, REPAIRS AS INDICATED;  Surgeon: Jarrell Harris M.D.;  Location: [facility];  Service: Orthopedics    AR LAP REMOVE ADJUST NATIVIDAD RESTRICT D* N/A 04/21/2022    Procedure: REMOVAL, GASTRIC BAND AND PORT, LAPAROSCOPIC;  Surgeon: Driss Louis M.D.;  Location: SURGERY [facility];  Service: General    AR UPPER GI ENDOSCOPY,DIAGNOSIS  06/03/2021    Procedure: GASTROSCOPY;  Surgeon: Jaime Starr M.D.;  Location: SURGERY [facility];  Service: EUS    EGD  "W/ENDOSCOPIC ULTRASOUND  06/03/2021    Procedure: EGD, WITH ENDOSCOPIC US;  Surgeon: Jaime Starr M.D.;  Location: SURGERY [facility];  Service: EUS    EGD WITH ASP/BX  06/03/2021    Procedure: EGD, WITH ASPIRATION BIOPSY;  Surgeon: Jaime Starr M.D.;  Location: SURGERY [facility];  Service: EUS    SHOULDER ARTHROSCOPY Left 2019    SHOULDER ARTHROSCOPY Right 2018    CHOLECYSTECTOMY  2017    GASTRIC BANDING LAPAROSCOPIC  2008    OTHER ORTHOPEDIC SURGERY Right 1998    tarsal fusion     ARTHROSCOPY, KNEE      OTHER ORTHOPEDIC SURGERY Right     foot surgery    SLEEVE,LILLIE VASO THIGH      TONSILLECTOMY      as a child       Family History   Problem Relation Age of Onset    Lung Disease Mother     Hypertension Mother     Lung Cancer Mother     Lung Disease Father     Cancer Father     Hypertension Father     Colon Cancer Father         Colon cancer,    Lung Cancer Father        Allergies as of 02/19/2025 - Reviewed 02/19/2025   Allergen Reaction Noted    Soap Shortness of Breath 04/21/2022    Clindamycin Rash and Vomiting 05/17/2023    Codeine Itching, Nausea, Unspecified, and Vomiting 04/18/2022    Hydrocodone Vomiting and Nausea 03/02/2021    Oxycodone Itching and Nausea 01/22/2019        Vitals:  /84 (BP Location: Left arm, Patient Position: Sitting, BP Cuff Size: Adult)   Pulse (!) 58   Resp 16   Ht 1.727 m (5' 8\")   Wt 120 kg (265 lb)   SpO2 92%     Current medications as of today   Current Outpatient Medications   Medication Sig Dispense Refill    montelukast (SINGULAIR) 10 MG Tab TAKE 1 TABLET BY MOUTH IN THE EVENING 90 Tablet 3    albuterol (PROAIR HFA) 108 (90 Base) MCG/ACT Aero Soln inhalation aerosol Inhale 2 Puffs every 6 hours as needed for Shortness of Breath. 54 g 3    budesonide-formoterol (BREYNA) 160-4.5 MCG/ACT Aerosol USE ONE PUFF EVERY 12 HOURS FOR SHORTNESS OF BREATH 30.6 g 3    Tirzepatide (MOUNJARO) 7.5 MG/0.5ML Solution Pen-injector inject 10 mg Subcutaneous weekly for 90 days "      amLODIPine (NORVASC) 2.5 MG Tab Take 2.5 mg by mouth every day.      Bempedoic Acid-Ezetimibe (NEXLIZET) 180-10 MG Tab Take  by mouth.      lidocaine (ASPERFLEX) 4 % Patch Place 1 Patch on the skin 1 time a day as needed (As directed on package). 10 Patch 0    Azelastine (ASTELIN) 137 MCG/SPRAY Solution Administer 2 Sprays into affected nostril(S) 2 times a day.      Cholecalciferol (VITAMIN D3) 125 MCG (5000 UT) Cap Take 5,000 Units by mouth.      rivaroxaban (XARELTO) 20 MG Tab tablet 1 tablet with food Orally Once a day      acetaminophen (TYLENOL) 650 MG CR tablet Take 1,300 mg by mouth every day.      traZODone (DESYREL) 100 MG Tab Take  mg by mouth at bedtime as needed.      omeprazole (PRILOSEC) 20 MG delayed-release capsule Take 20 mg by mouth every morning.      zinc gluconate 50 MG Tab tablet Take 50 mg by mouth. TWICE PER WEEK      Coenzyme Q10 (CO Q-10) 200 MG Cap Take 200 mg by mouth every day.      Cyanocobalamin (VITAMIN B-12 PO) Take 1 Tablet by mouth every day.      Magnesium 400 MG Cap Take 1 capsule by mouth every day.      MOUNJARO 5 MG/0.5ML Solution Pen-injector inject 5 mg SC weekly Subcutaneous weekly for 90 days      famotidine (PEPCID) 20 MG Tab Take 20 mg by mouth every evening. (Patient not taking: Reported on 2/19/2025)      metoprolol SR (TOPROL XL) 25 MG TABLET SR 24 HR Take 12.5 mg by mouth 2 times a day. (Patient not taking: Reported on 2/19/2025)       No current facility-administered medications for this visit.         Physical Exam:   Gen:           Alert and oriented, No apparent distress. Mood and affect appropriate, normal interaction with examiner.  Eyes:          PERRL, EOM intact, sclere white, conjunctive moist.  Ears:          Not examined.   Hearing:     Grossly intact.  Nose:          Normal, no lesions or deformities.  Dentition:    Good dentition.  Oropharynx:   Tongue normal, posterior pharynx without erythema or exudate.  Neck:        Supple, trachea  midline, no masses.  Respiratory Effort: No intercostal retractions or use of accessory muscles.   Lung Auscultation:      Clear to auscultation bilaterally; no rales, rhonchi or wheezing.  CV:            Regular rate and rhythm. No murmurs, rubs or gallops.  Abd:           Not examined.   Lymphadenopathy: Not examined.  Gait and Station: Normal.  Digits and Nails: No clubbing, cyanosis, petechiae, or nodes.   Cranial Nerves: II-XII grossly intact.  Skin:        No rashes, lesions or ulcers noted.               Ext:           No cyanosis or edema.      Assessment:  1. ZURDO on CPAP              Plan:  Using and benefiting from PAP therapy.  Compliance shows adequate use and control of ZURDO.  Patient advised to follow-up in 7 months for annual ZURDO, but can be seen sooner if needed.    Please note that this dictation was created using voice recognition software. I have made every reasonable attempt to correct obvious errors, but it is possible there are errors of grammar and possibly content that I did not discover before finalizing the note.  \

## 2025-02-21 ENCOUNTER — APPOINTMENT (OUTPATIENT)
Dept: URBAN - METROPOLITAN AREA CLINIC 36 | Facility: CLINIC | Age: 70
Setting detail: DERMATOLOGY
End: 2025-02-21

## 2025-02-21 DIAGNOSIS — Z48.02 ENCOUNTER FOR REMOVAL OF SUTURES: ICD-10-CM

## 2025-02-21 PROCEDURE — ? SUTURE REMOVAL (GLOBAL PERIOD)

## 2025-02-21 PROCEDURE — ? PHOTO-DOCUMENTATION

## 2025-02-21 PROCEDURE — ? COUNSELING

## 2025-02-21 ASSESSMENT — LOCATION DETAILED DESCRIPTION DERM: LOCATION DETAILED: LEFT CENTRAL MALAR CHEEK

## 2025-02-21 ASSESSMENT — LOCATION SIMPLE DESCRIPTION DERM: LOCATION SIMPLE: LEFT CHEEK

## 2025-02-21 ASSESSMENT — LOCATION ZONE DERM: LOCATION ZONE: FACE

## 2025-02-21 NOTE — PROCEDURE: SUTURE REMOVAL (GLOBAL PERIOD)
Detail Level: Simple
Add 60908 Cpt? (Important Note: In 2017 The Use Of 66863 Is Being Tracked By Cms To Determine Future Global Period Reimbursement For Global Periods): no

## 2025-04-11 ENCOUNTER — HOSPITAL ENCOUNTER (OUTPATIENT)
Dept: RADIOLOGY | Facility: MEDICAL CENTER | Age: 70
End: 2025-04-11
Attending: STUDENT IN AN ORGANIZED HEALTH CARE EDUCATION/TRAINING PROGRAM
Payer: COMMERCIAL

## 2025-04-11 DIAGNOSIS — M81.8 IDIOPATHIC OSTEOPOROSIS: ICD-10-CM

## 2025-04-11 PROCEDURE — 77080 DXA BONE DENSITY AXIAL: CPT

## 2025-06-17 ENCOUNTER — APPOINTMENT (OUTPATIENT)
Dept: URBAN - METROPOLITAN AREA CLINIC 6 | Facility: CLINIC | Age: 70
Setting detail: DERMATOLOGY
End: 2025-06-17

## 2025-06-17 DIAGNOSIS — D18.0 HEMANGIOMA: ICD-10-CM

## 2025-06-17 DIAGNOSIS — L57.0 ACTINIC KERATOSIS: ICD-10-CM

## 2025-06-17 DIAGNOSIS — L82.0 INFLAMED SEBORRHEIC KERATOSIS: ICD-10-CM

## 2025-06-17 DIAGNOSIS — Z71.89 OTHER SPECIFIED COUNSELING: ICD-10-CM

## 2025-06-17 DIAGNOSIS — D22 MELANOCYTIC NEVI: ICD-10-CM

## 2025-06-17 DIAGNOSIS — L82.1 OTHER SEBORRHEIC KERATOSIS: ICD-10-CM

## 2025-06-17 DIAGNOSIS — L81.4 OTHER MELANIN HYPERPIGMENTATION: ICD-10-CM

## 2025-06-17 DIAGNOSIS — Z85.828 PERSONAL HISTORY OF OTHER MALIGNANT NEOPLASM OF SKIN: ICD-10-CM

## 2025-06-17 PROBLEM — D22.72 MELANOCYTIC NEVI OF LEFT LOWER LIMB, INCLUDING HIP: Status: ACTIVE | Noted: 2025-06-17

## 2025-06-17 PROBLEM — D22.5 MELANOCYTIC NEVI OF TRUNK: Status: ACTIVE | Noted: 2025-06-17

## 2025-06-17 PROBLEM — D22.61 MELANOCYTIC NEVI OF RIGHT UPPER LIMB, INCLUDING SHOULDER: Status: ACTIVE | Noted: 2025-06-17

## 2025-06-17 PROBLEM — D22.71 MELANOCYTIC NEVI OF RIGHT LOWER LIMB, INCLUDING HIP: Status: ACTIVE | Noted: 2025-06-17

## 2025-06-17 PROBLEM — D22.62 MELANOCYTIC NEVI OF LEFT UPPER LIMB, INCLUDING SHOULDER: Status: ACTIVE | Noted: 2025-06-17

## 2025-06-17 PROBLEM — D18.01 HEMANGIOMA OF SKIN AND SUBCUTANEOUS TISSUE: Status: ACTIVE | Noted: 2025-06-17

## 2025-06-17 PROCEDURE — ? LIQUID NITROGEN

## 2025-06-17 PROCEDURE — ? SUNSCREEN RECOMMENDATIONS

## 2025-06-17 PROCEDURE — ? COUNSELING

## 2025-06-17 ASSESSMENT — LOCATION SIMPLE DESCRIPTION DERM
LOCATION SIMPLE: RIGHT UPPER ARM
LOCATION SIMPLE: RIGHT FOREARM
LOCATION SIMPLE: CHEST
LOCATION SIMPLE: LEFT CHEEK
LOCATION SIMPLE: LEFT UPPER BACK
LOCATION SIMPLE: RIGHT CALF
LOCATION SIMPLE: RIGHT UPPER BACK
LOCATION SIMPLE: RIGHT THIGH
LOCATION SIMPLE: RIGHT LOWER BACK
LOCATION SIMPLE: NOSE
LOCATION SIMPLE: LEFT CALF
LOCATION SIMPLE: RIGHT POSTERIOR THIGH
LOCATION SIMPLE: ANTERIOR SCALP
LOCATION SIMPLE: LEFT UPPER ARM
LOCATION SIMPLE: LEFT POSTERIOR THIGH
LOCATION SIMPLE: LEFT THIGH
LOCATION SIMPLE: LEFT FOREARM
LOCATION SIMPLE: LEFT SHOULDER
LOCATION SIMPLE: LEFT LIP

## 2025-06-17 ASSESSMENT — LOCATION ZONE DERM
LOCATION ZONE: LIP
LOCATION ZONE: NOSE
LOCATION ZONE: FACE
LOCATION ZONE: SCALP
LOCATION ZONE: ARM
LOCATION ZONE: LEG
LOCATION ZONE: TRUNK

## 2025-06-17 ASSESSMENT — LOCATION DETAILED DESCRIPTION DERM
LOCATION DETAILED: RIGHT DISTAL POSTERIOR THIGH
LOCATION DETAILED: RIGHT LATERAL PROXIMAL UPPER ARM
LOCATION DETAILED: LEFT ANTERIOR DISTAL UPPER ARM
LOCATION DETAILED: RIGHT VENTRAL PROXIMAL FOREARM
LOCATION DETAILED: LEFT ANTERIOR PROXIMAL THIGH
LOCATION DETAILED: LEFT VENTRAL DISTAL FOREARM
LOCATION DETAILED: LEFT UPPER CUTANEOUS LIP
LOCATION DETAILED: LEFT PROXIMAL DORSAL FOREARM
LOCATION DETAILED: LEFT NASAL DORSUM
LOCATION DETAILED: RIGHT ANTERIOR DISTAL UPPER ARM
LOCATION DETAILED: LEFT MEDIAL MALAR CHEEK
LOCATION DETAILED: RIGHT MID-UPPER BACK
LOCATION DETAILED: LEFT ANTERIOR SHOULDER
LOCATION DETAILED: LEFT SUPERIOR CENTRAL MALAR CHEEK
LOCATION DETAILED: RIGHT PROXIMAL LATERAL CALF
LOCATION DETAILED: LEFT PROXIMAL CALF
LOCATION DETAILED: LEFT DISTAL POSTERIOR THIGH
LOCATION DETAILED: RIGHT DISTAL CALF
LOCATION DETAILED: RIGHT MEDIAL INFERIOR CHEST
LOCATION DETAILED: RIGHT PROXIMAL DORSAL FOREARM
LOCATION DETAILED: RIGHT VENTRAL DISTAL FOREARM
LOCATION DETAILED: LEFT INFERIOR UPPER BACK
LOCATION DETAILED: RIGHT SUPERIOR LATERAL MIDBACK
LOCATION DETAILED: RIGHT ANTERIOR PROXIMAL THIGH
LOCATION DETAILED: RIGHT PROXIMAL CALF
LOCATION DETAILED: MID-FRONTAL SCALP
LOCATION DETAILED: LEFT CENTRAL MALAR CHEEK

## 2025-06-17 NOTE — PROCEDURE: LIQUID NITROGEN
Consent: The patient's consent was obtained including but not limited to risks of crusting, scabbing, blistering, scarring, darker or lighter pigmentary change, recurrence, incomplete removal and infection.
Show Aperture Variable?: Yes
Post-Care Instructions: I reviewed with the patient in detail post-care instructions. Patient is to wear sunprotection, and avoid picking at any of the treated lesions. Pt may apply Vaseline to crusted or scabbing areas.
Duration Of Freeze Thaw-Cycle (Seconds): 3
Detail Level: Detailed
Number Of Freeze-Thaw Cycles: 3 freeze-thaw cycles
Render Post-Care Instructions In Note?: no
Medical Necessity Clause: This procedure was medically necessary because the lesions that were treated were:
Spray Paint Text: The liquid nitrogen was applied to the skin utilizing a spray paint frosting technique.
Medical Necessity Information: It is in your best interest to select a reason for this procedure from the list below. All of these items fulfill various CMS LCD requirements except the new and changing color options.

## 2025-08-11 ENCOUNTER — HOSPITAL ENCOUNTER (OUTPATIENT)
Dept: RADIOLOGY | Facility: MEDICAL CENTER | Age: 70
End: 2025-08-11
Attending: FAMILY MEDICINE
Payer: COMMERCIAL

## 2025-08-11 VITALS — BODY MASS INDEX: 38.19 KG/M2 | WEIGHT: 252 LBS | HEIGHT: 68 IN

## 2025-08-11 DIAGNOSIS — Z12.31 ENCOUNTER FOR MAMMOGRAM TO ESTABLISH BASELINE MAMMOGRAM: ICD-10-CM

## 2025-08-11 PROCEDURE — 77067 SCR MAMMO BI INCL CAD: CPT

## 2025-08-11 ASSESSMENT — FIBROSIS 4 INDEX: FIB4 SCORE: 1.41

## 2025-08-29 ENCOUNTER — OFFICE VISIT (OUTPATIENT)
Dept: SLEEP MEDICINE | Facility: MEDICAL CENTER | Age: 70
End: 2025-08-29
Attending: PHYSICIAN ASSISTANT
Payer: COMMERCIAL

## 2025-08-29 ENCOUNTER — APPOINTMENT (OUTPATIENT)
Dept: SLEEP MEDICINE | Facility: MEDICAL CENTER | Age: 70
End: 2025-08-29
Attending: NURSE PRACTITIONER
Payer: COMMERCIAL

## 2025-08-29 VITALS
WEIGHT: 252 LBS | BODY MASS INDEX: 38.19 KG/M2 | OXYGEN SATURATION: 94 % | DIASTOLIC BLOOD PRESSURE: 70 MMHG | RESPIRATION RATE: 18 BRPM | HEART RATE: 63 BPM | HEIGHT: 68 IN | SYSTOLIC BLOOD PRESSURE: 144 MMHG

## 2025-08-29 DIAGNOSIS — J30.9 ALLERGIC RHINITIS, UNSPECIFIED SEASONALITY, UNSPECIFIED TRIGGER: ICD-10-CM

## 2025-08-29 DIAGNOSIS — G47.33 OSA (OBSTRUCTIVE SLEEP APNEA): Primary | ICD-10-CM

## 2025-08-29 PROCEDURE — 3078F DIAST BP <80 MM HG: CPT | Performed by: PHYSICIAN ASSISTANT

## 2025-08-29 PROCEDURE — 99214 OFFICE O/P EST MOD 30 MIN: CPT | Performed by: PHYSICIAN ASSISTANT

## 2025-08-29 PROCEDURE — 3077F SYST BP >= 140 MM HG: CPT | Performed by: PHYSICIAN ASSISTANT

## 2025-08-29 PROCEDURE — 99213 OFFICE O/P EST LOW 20 MIN: CPT | Performed by: PHYSICIAN ASSISTANT

## 2025-08-29 RX ORDER — FLECAINIDE ACETATE 150 MG/1
150 TABLET ORAL 2 TIMES DAILY
COMMUNITY

## 2025-08-29 RX ORDER — AZELASTINE HYDROCHLORIDE 137 UG/1
2 SPRAY, METERED NASAL 2 TIMES DAILY
Qty: 30 ML | Refills: 11 | Status: SHIPPED | OUTPATIENT
Start: 2025-08-29

## 2025-08-29 ASSESSMENT — FIBROSIS 4 INDEX: FIB4 SCORE: 1.41

## (undated) DEVICE — KIT ANESTHESIA W/CIRCUIT & 3/LT BAG W/FILTER (20EA/CA)

## (undated) DEVICE — CATHETER IV SAFETY 22 GA X 1 (50EA/BX)

## (undated) DEVICE — SLEEVE, VASO, THIGH, MED

## (undated) DEVICE — SODIUM CHL IRRIGATION 0.9% 1000ML (12EA/CA)

## (undated) DEVICE — LACTATED RINGERS INJ 1000 ML - (14EA/CA 60CA/PF)

## (undated) DEVICE — HEAD HOLDER JUNIOR/ADULT

## (undated) DEVICE — SET LEADWIRE 5 LEAD BEDSIDE DISPOSABLE ECG (1SET OF 5/EA)

## (undated) DEVICE — CANNULA W/SEAL 5X100 Z-THRE - ADED KII (12/BX)

## (undated) DEVICE — SET EXTENSION WITH 2 PORTS (48EA/CA) ***PART #2C8610 IS A SUBSTITUTE*****

## (undated) DEVICE — TUBING CLEARLINK DUO-VENT - C-FLO (48EA/CA)

## (undated) DEVICE — SUTURE GENERAL

## (undated) DEVICE — ELECTRODE 5MM LHK LAPSCP STERILE DISP- MEGADYNE  (5/CA)

## (undated) DEVICE — HUMID-VENT HEAT AND MOISTURE EXCHANGE- (50/BX)

## (undated) DEVICE — SYRINGE 20 ML LS (48/BX 4BX/CA)

## (undated) DEVICE — GOWN SURGEONS LARGE - (32/CA)

## (undated) DEVICE — PAD PREP 24 X 48 CUFFED - (100/CA)

## (undated) DEVICE — PIN FIXATION BONE STERILE 3.2MM X 110MM (2EA/PK)

## (undated) DEVICE — SYSTEM CLEARIFY VISUALIZATION (10EA/PK)

## (undated) DEVICE — GOWN SURGEONS X-LARGE - DISP. (30/CA)

## (undated) DEVICE — TUBE E-T HI-LO CUFF 7.5MM (10EA/PK)

## (undated) DEVICE — TUBE CONNECTING SUCTION - CLEAR PLASTIC STERILE 72 IN (50EA/CA)

## (undated) DEVICE — SYRINGE SAFETY 3 ML 18 GA X 1 1/2 BLUNT LL (100/BX 8BX/CA)

## (undated) DEVICE — SHEET TRANSVERSE LAP - (12EA/CA)

## (undated) DEVICE — PROTECTOR ULNA NERVE - (36PR/CA)

## (undated) DEVICE — BAG SPONGE COUNT 10.25 X 32 - BLUE (250/CA)

## (undated) DEVICE — KIT CUSTOM PROCEDURE SINGLE FOR ENDO  (15/CA)

## (undated) DEVICE — DEVICE HEMOSTATIC CLIPPING RESOLUTION 360 DEGREES (20EA/BX)

## (undated) DEVICE — PACK MINOR BASIN - (2EA/CA)

## (undated) DEVICE — MASK ANESTHESIA ADULT  - (100/CA)

## (undated) DEVICE — SLEEVE VASO CALF MED - (10PR/CA)

## (undated) DEVICE — TUBE E-T HI-LO CUFF 7.0MM (10EA/PK)

## (undated) DEVICE — DRESSING TRANSPARENT FILM TEGADERM 2.375 X 2.75"  (100EA/BX)"

## (undated) DEVICE — TOWELS CLOTH SURGICAL - (4/PK 20PK/CA)

## (undated) DEVICE — FORCEP RADIAL JAW 4 STANDARD CAPACITY W/NEEDLE 240CM (40EA/BX)

## (undated) DEVICE — GLOVE BIOGEL SZ 8.5 SURGICAL PF LTX - (50PR/BX 4BX/CA)

## (undated) DEVICE — Device

## (undated) DEVICE — TUBE E-T HI-LO CUFF 8.0MM (10EA/PK)

## (undated) DEVICE — TOWEL STOP TIMEOUT SAFETY FLAG (40EA/CA)

## (undated) DEVICE — GLOVE BIOGEL INDICATOR SZ 8 SURGICAL PF LTX - (50/BX 4BX/CA)

## (undated) DEVICE — MASK WITH FACE SHIELD (25/BX 4BX/CA)

## (undated) DEVICE — SUCTION INSTRUMENT YANKAUER BULBOUS TIP W/O VENT (50EA/CA)

## (undated) DEVICE — CAPTIVATOR II-10MM ROUND STIFF  (40/BX)

## (undated) DEVICE — BLADE 90X18X1.27MM SAW SAGITTAL

## (undated) DEVICE — STOCKINETTE IMPERVIOUS 12X48 - STERILELF (10/CA)"

## (undated) DEVICE — SUTURE 4-0 VICRYL PLUS FS-2 - 27 INCH (36/BX)

## (undated) DEVICE — ELECTRODE DUAL RETURN W/ CORD - (50/PK)

## (undated) DEVICE — SENSOR SPO2 NEO LNCS ADHESIVE (20/BX) SEE USER NOTES

## (undated) DEVICE — GLOVE BIOGEL PI INDICATOR SZ 8.0 SURGICAL PF LF -(50/BX 4BX/CA)

## (undated) DEVICE — WATER IRRIGATION STERILE 1000ML (12EA/CA)

## (undated) DEVICE — PIN FIXATION BONE STERILE 3.2MM X 140MM (2EA/PK)

## (undated) DEVICE — SUTURE 3-0 VICRYL PLUS SH - 8X 18 INCH (12/BX)

## (undated) DEVICE — CHLORAPREP 26 ML APPLICATOR - ORANGE TINT(25/CA)

## (undated) DEVICE — SET TUBING PNEUMOCLEAR HIGH FLOW SMOKE EVACUATION (10EA/BX)

## (undated) DEVICE — CATHERTER CLEAR SINGLE USE INJECTION THERAPY NEEDLE 25GA X 4MM  2.3MM X 240CM (5EA/BX)

## (undated) DEVICE — SUTURE 2-0 MONOCRYL PLUS UNDYED CT-1 1 X 36 (36EA/BX)"

## (undated) DEVICE — SYRINGE SAFETY 5 ML 18 GA X 1-1/2 BLUNT LL (100/BX 4BX/CA)

## (undated) DEVICE — BLADE SURGICAL #15 - (50/BX 3BX/CA)

## (undated) DEVICE — CANISTER SUCTION RIGID RED 1500CC (40EA/CA)

## (undated) DEVICE — TUBE E-T HI-LO CUFF 8.5MM (10EA/PK)

## (undated) DEVICE — SUTURE 3-0 VICRYL PLUS SH - 27 INCH (36/BX)

## (undated) DEVICE — GLOVE, LITE (PAIR)

## (undated) DEVICE — GLOVE BIOGEL SZ 8 SURGICAL PF LTX - (50PR/BX 4BX/CA)

## (undated) DEVICE — SPONGE GAUZESTER. 2X2 4-PL - (2/PK 50PK/BX 30BX/CS)

## (undated) DEVICE — CATHETER IV SAFETY 20 GA X 1-1/4 (50/BX)

## (undated) DEVICE — TUBE E-T HI-LO CUFF 6.5MM (10EA/BX)

## (undated) DEVICE — HANDPIECE THUNDERBEAT 5MM 45CM FRONT GRIP TYPE S (5EA/BX)

## (undated) DEVICE — SYRINGE DISP. 60 CC LL - (30/BX, 12BX/CA)**WHEN THESE ARE GONE ORDER #500206**

## (undated) DEVICE — GOWN WARMING STANDARD FLEX - (30/CA)

## (undated) DEVICE — GOWN WARMING X-LARGE FLEX - (20/CA)

## (undated) DEVICE — COVER MAYO STAND X-LG - (22EA/CA)

## (undated) DEVICE — PAD UNIVERSAL MULTI USE (1/EA)

## (undated) DEVICE — BITE BLOCK ADULT 60FR (100EA/CA)

## (undated) DEVICE — KIT  I.V. START (100EA/CA)

## (undated) DEVICE — ELECTRODE 850 FOAM ADHESIVE - HYDROGEL RADIOTRNSPRNT (50/PK)

## (undated) DEVICE — GLOVE SZ 7.5 BIOGEL PI MICRO - PF LF (50PR/BX)

## (undated) DEVICE — SENSOR OXIMETER ADULT SPO2 RD SET (20EA/BX)

## (undated) DEVICE — GLOVE BIOGEL PI INDICATOR SZ 8.5 SURGICAL PF LF - (50PR/BX 4BX/CA)

## (undated) DEVICE — TUBE SUCTION YANKAUER  1/4 X 6FT (20EA/CA)"

## (undated) DEVICE — NEEDLE NON SAFETY 25 GA X 1 1/2 IN HYPO (100EA/BX)

## (undated) DEVICE — LENS/HOOD FOR SPACESUIT - (32/PK) PEEL AWAY FACE

## (undated) DEVICE — SENSOR SPO2 ADULT LNCS ADTX (20/BX) ORDER ITEM #19593

## (undated) DEVICE — ROBOTIC SURGERY SERVICES

## (undated) DEVICE — SODIUM CHL. IRRIGATION 0.9% 3000ML (4EA/CA 65CA/PF)

## (undated) DEVICE — SUTURE 3-0 MONOCRYL PLUS PS-1 - 27 INCH (36/BX)

## (undated) DEVICE — TROCAR 5X100 NON BLADED Z-TH - READ KII (6/BX)

## (undated) DEVICE — SYRINGE SAFETY 10 ML 18 GA X 1 1/2 BLUNT LL (100/BX 4BX/CA)

## (undated) DEVICE — CANISTER SUCTION 3000ML MECHANICAL FILTER AUTO SHUTOFF MEDI-VAC NONSTERILE LF DISP  (40EA/CA)

## (undated) DEVICE — NEPTUNE 4 PORT MANIFOLD - (20/PK)

## (undated) DEVICE — PACK GASTRIC BANDING OR - (1/CA)

## (undated) DEVICE — DRESSING AQUACEL AG ADVANTAGE 3.5 X 10" (10EA/BX)"

## (undated) DEVICE — TROCAR Z THREAD12MM OPTICAL - NON BLADED (6/BX)